# Patient Record
Sex: MALE | Race: WHITE | NOT HISPANIC OR LATINO | Employment: OTHER | URBAN - NONMETROPOLITAN AREA
[De-identification: names, ages, dates, MRNs, and addresses within clinical notes are randomized per-mention and may not be internally consistent; named-entity substitution may affect disease eponyms.]

---

## 2017-08-29 ENCOUNTER — ANESTHESIA (OUTPATIENT)
Dept: GASTROENTEROLOGY | Facility: HOSPITAL | Age: 62
End: 2017-08-29

## 2017-08-29 ENCOUNTER — ANESTHESIA EVENT (OUTPATIENT)
Dept: GASTROENTEROLOGY | Facility: HOSPITAL | Age: 62
End: 2017-08-29

## 2017-08-29 ENCOUNTER — HOSPITAL ENCOUNTER (OUTPATIENT)
Facility: HOSPITAL | Age: 62
Setting detail: HOSPITAL OUTPATIENT SURGERY
Discharge: HOME | End: 2017-08-29
Attending: INTERNAL MEDICINE | Admitting: INTERNAL MEDICINE

## 2017-08-29 VITALS
OXYGEN SATURATION: 93 % | HEART RATE: 88 BPM | TEMPERATURE: 97.1 F | BODY MASS INDEX: 20.51 KG/M2 | HEIGHT: 73 IN | DIASTOLIC BLOOD PRESSURE: 85 MMHG | SYSTOLIC BLOOD PRESSURE: 153 MMHG | RESPIRATION RATE: 20 BRPM | WEIGHT: 154.76 LBS

## 2017-08-29 DIAGNOSIS — K22.2 STRICTURE AND STENOSIS OF ESOPHAGUS: ICD-10-CM

## 2017-08-29 PROCEDURE — 25010000002 PROPOFOL 10 MG/ML EMULSION: Performed by: NURSE ANESTHETIST, CERTIFIED REGISTERED

## 2017-08-29 PROCEDURE — 88305 TISSUE EXAM BY PATHOLOGIST: CPT | Performed by: PATHOLOGY

## 2017-08-29 PROCEDURE — 88305 TISSUE EXAM BY PATHOLOGIST: CPT | Performed by: INTERNAL MEDICINE

## 2017-08-29 RX ORDER — DEXTROSE AND SODIUM CHLORIDE 5; .45 G/100ML; G/100ML
30 INJECTION, SOLUTION INTRAVENOUS CONTINUOUS
Status: DISCONTINUED | OUTPATIENT
Start: 2017-08-29 | End: 2017-08-29 | Stop reason: HOSPADM

## 2017-08-29 RX ORDER — LIDOCAINE HYDROCHLORIDE 10 MG/ML
INJECTION, SOLUTION INFILTRATION; PERINEURAL AS NEEDED
Status: DISCONTINUED | OUTPATIENT
Start: 2017-08-29 | End: 2017-08-29 | Stop reason: SURG

## 2017-08-29 RX ORDER — HYDROXYZINE HYDROCHLORIDE 25 MG/1
25 TABLET, FILM COATED ORAL 3 TIMES DAILY PRN
COMMUNITY
End: 2023-01-16 | Stop reason: ALTCHOICE

## 2017-08-29 RX ORDER — GABAPENTIN 400 MG/1
400 CAPSULE ORAL 3 TIMES DAILY
COMMUNITY
End: 2020-01-06 | Stop reason: HOSPADM

## 2017-08-29 RX ORDER — ALBUTEROL SULFATE 90 UG/1
2 AEROSOL, METERED RESPIRATORY (INHALATION) EVERY 6 HOURS PRN
COMMUNITY

## 2017-08-29 RX ORDER — CLOPIDOGREL BISULFATE 75 MG/1
75 TABLET ORAL DAILY
COMMUNITY
End: 2022-05-20

## 2017-08-29 RX ORDER — CYCLOBENZAPRINE HCL 5 MG
5 TABLET ORAL 3 TIMES DAILY PRN
COMMUNITY

## 2017-08-29 RX ORDER — DOCUSATE SODIUM 100 MG/1
100 CAPSULE, LIQUID FILLED ORAL 2 TIMES DAILY
COMMUNITY
End: 2020-01-06 | Stop reason: HOSPADM

## 2017-08-29 RX ORDER — SERTRALINE HYDROCHLORIDE 100 MG/1
100 TABLET, FILM COATED ORAL DAILY
COMMUNITY
End: 2021-03-16

## 2017-08-29 RX ORDER — ATORVASTATIN CALCIUM 80 MG/1
80 TABLET, FILM COATED ORAL DAILY
Status: ON HOLD | COMMUNITY
End: 2020-01-02

## 2017-08-29 RX ORDER — PROPOFOL 10 MG/ML
VIAL (ML) INTRAVENOUS AS NEEDED
Status: DISCONTINUED | OUTPATIENT
Start: 2017-08-29 | End: 2017-08-29 | Stop reason: SURG

## 2017-08-29 RX ORDER — ALBUTEROL SULFATE 1.25 MG/3ML
1 SOLUTION RESPIRATORY (INHALATION) EVERY 6 HOURS PRN
Status: ON HOLD | COMMUNITY
End: 2022-09-08

## 2017-08-29 RX ORDER — BUDESONIDE AND FORMOTEROL FUMARATE DIHYDRATE 160; 4.5 UG/1; UG/1
2 AEROSOL RESPIRATORY (INHALATION)
COMMUNITY

## 2017-08-29 RX ORDER — ATENOLOL 50 MG/1
50 TABLET ORAL DAILY
COMMUNITY
End: 2020-01-06 | Stop reason: HOSPADM

## 2017-08-29 RX ADMIN — DEXTROSE AND SODIUM CHLORIDE 30 ML/HR: 5; 450 INJECTION, SOLUTION INTRAVENOUS at 09:36

## 2017-08-29 RX ADMIN — PROPOFOL 50 MG: 10 INJECTION, EMULSION INTRAVENOUS at 10:50

## 2017-08-29 RX ADMIN — PROPOFOL 50 MG: 10 INJECTION, EMULSION INTRAVENOUS at 10:52

## 2017-08-29 RX ADMIN — LIDOCAINE HYDROCHLORIDE 80 MG: 10 INJECTION, SOLUTION INFILTRATION; PERINEURAL at 10:48

## 2017-08-29 RX ADMIN — PROPOFOL 150 MG: 10 INJECTION, EMULSION INTRAVENOUS at 10:48

## 2017-08-29 NOTE — ANESTHESIA PREPROCEDURE EVALUATION
Anesthesia Evaluation     NPO Solid Status: > 8 hours  NPO Liquid Status: > 8 hours     Airway   Mallampati: II  TM distance: >3 FB  Neck ROM: full  no difficulty expected  Dental    (+) edentulous    Pulmonary    (+) COPD, asthma, decreased breath sounds,   Cardiovascular - normal exam    (+) hypertension, CAD,       Neuro/Psych  GI/Hepatic/Renal/Endo      Musculoskeletal     (+) chronic pain,   Abdominal    Substance History      OB/GYN          Other                                        Anesthesia Plan    ASA 3     MAC     intravenous induction   Anesthetic plan and risks discussed with patient.

## 2017-08-29 NOTE — ANESTHESIA POSTPROCEDURE EVALUATION
Patient: Aubrey Montilla    Procedure Summary     Date Anesthesia Start Anesthesia Stop Room / Location    08/29/17 1033 1105 [facility] ENDOSCOPY 2 / [facility] ENDOSCOPY       Procedure Diagnosis Surgeon Provider    ESOPHAGOGASTRODUODENOSCOPY (N/A Esophagus) Stricture and stenosis of esophagus  (Stricture and stenosis of esophagus [K22.2]) DO David Fajardo CRNA          Anesthesia Type: MAC  Last vitals  BP        Temp        Pulse       Resp        SpO2          Post Anesthesia Care and Evaluation    Patient location during evaluation: bedside  Patient participation: complete - patient participated  Level of consciousness: awake and alert  Pain score: 0  Pain management: adequate  Airway patency: patent  Anesthetic complications: No anesthetic complications  PONV Status: none  Cardiovascular status: acceptable  Respiratory status: acceptable  Hydration status: acceptable

## 2017-08-31 LAB
LAB AP CASE REPORT: NORMAL
Lab: NORMAL
PATH REPORT.FINAL DX SPEC: NORMAL
PATH REPORT.GROSS SPEC: NORMAL

## 2019-06-11 ENCOUNTER — HOSPITAL ENCOUNTER (OUTPATIENT)
Facility: HOSPITAL | Age: 64
Setting detail: HOSPITAL OUTPATIENT SURGERY
Discharge: HOME | End: 2019-06-11
Attending: INTERNAL MEDICINE | Admitting: INTERNAL MEDICINE

## 2019-06-11 ENCOUNTER — ANESTHESIA (OUTPATIENT)
Dept: GASTROENTEROLOGY | Facility: HOSPITAL | Age: 64
End: 2019-06-11

## 2019-06-11 ENCOUNTER — ANESTHESIA EVENT (OUTPATIENT)
Dept: GASTROENTEROLOGY | Facility: HOSPITAL | Age: 64
End: 2019-06-11

## 2019-06-11 VITALS
HEART RATE: 76 BPM | WEIGHT: 144.84 LBS | OXYGEN SATURATION: 98 % | BODY MASS INDEX: 19.2 KG/M2 | TEMPERATURE: 96.8 F | HEIGHT: 73 IN | RESPIRATION RATE: 18 BRPM | SYSTOLIC BLOOD PRESSURE: 187 MMHG | DIASTOLIC BLOOD PRESSURE: 84 MMHG

## 2019-06-11 DIAGNOSIS — K22.70 BARRETT'S ESOPHAGUS: ICD-10-CM

## 2019-06-11 DIAGNOSIS — K21.9 ESOPHAGEAL REFLUX: ICD-10-CM

## 2019-06-11 PROCEDURE — 88342 IMHCHEM/IMCYTCHM 1ST ANTB: CPT | Performed by: INTERNAL MEDICINE

## 2019-06-11 PROCEDURE — 88305 TISSUE EXAM BY PATHOLOGIST: CPT | Performed by: PATHOLOGY

## 2019-06-11 PROCEDURE — 88342 IMHCHEM/IMCYTCHM 1ST ANTB: CPT | Performed by: PATHOLOGY

## 2019-06-11 PROCEDURE — 25010000002 PROPOFOL 10 MG/ML EMULSION: Performed by: NURSE ANESTHETIST, CERTIFIED REGISTERED

## 2019-06-11 PROCEDURE — 88305 TISSUE EXAM BY PATHOLOGIST: CPT | Performed by: INTERNAL MEDICINE

## 2019-06-11 RX ORDER — LIDOCAINE HYDROCHLORIDE 20 MG/ML
INJECTION, SOLUTION INTRAVENOUS AS NEEDED
Status: DISCONTINUED | OUTPATIENT
Start: 2019-06-11 | End: 2019-06-11 | Stop reason: SURG

## 2019-06-11 RX ORDER — PROPOFOL 10 MG/ML
VIAL (ML) INTRAVENOUS AS NEEDED
Status: DISCONTINUED | OUTPATIENT
Start: 2019-06-11 | End: 2019-06-11 | Stop reason: SURG

## 2019-06-11 RX ORDER — DEXTROSE AND SODIUM CHLORIDE 5; .45 G/100ML; G/100ML
30 INJECTION, SOLUTION INTRAVENOUS CONTINUOUS PRN
Status: DISCONTINUED | OUTPATIENT
Start: 2019-06-11 | End: 2019-06-11 | Stop reason: HOSPADM

## 2019-06-11 RX ADMIN — PROPOFOL 20 MG: 10 INJECTION, EMULSION INTRAVENOUS at 11:17

## 2019-06-11 RX ADMIN — DEXTROSE AND SODIUM CHLORIDE 30 ML/HR: 5; 450 INJECTION, SOLUTION INTRAVENOUS at 10:16

## 2019-06-11 RX ADMIN — LIDOCAINE HYDROCHLORIDE 100 MG: 20 INJECTION, SOLUTION INTRAVENOUS at 11:10

## 2019-06-11 RX ADMIN — PROPOFOL 20 MG: 10 INJECTION, EMULSION INTRAVENOUS at 11:13

## 2019-06-11 RX ADMIN — PROPOFOL 30 MG: 10 INJECTION, EMULSION INTRAVENOUS at 11:15

## 2019-06-11 RX ADMIN — PROPOFOL 100 MG: 10 INJECTION, EMULSION INTRAVENOUS at 11:10

## 2019-06-11 RX ADMIN — PROPOFOL 30 MG: 10 INJECTION, EMULSION INTRAVENOUS at 11:12

## 2019-06-11 NOTE — H&P
Garo Kirkland DO,[facility]  Gastroenterology  Hepatology  Endoscopy  Board Certified in Internal Medicine and gastroenterology  [address], [address]  [place]. [address]  - [phone]   F [phone]     GASTROENTEROLOGY HISTORY AND PHYSICAL  NOTE   GARO KIRKLAND DO.         SUBJECTIVE:   6/11/2019    Name: Aubrey Montilla  DOD: 1955        Chief Complaint:       Subjective : Esophageal stricture with recurrent symptoms of dysphasia    Patient is 63 y.o. male presents with desire for elective EGD and dilation.      ROS/HISTORY/ CURRENT MEDICATIONS/OBJECTIVE/VS/PE:   Review of Systems:  All systems unremarkable unless specified below.  Constitutional   HENT  Eyes   Respiratory    Cardiovascular  Gastrointestinal   Endocrine  Genitourinary    Musculoskeletal   Skin  Allergic/Immunologic    Neurological    Hematological  Psychiatric/Behavioral    History:   History reviewed. No pertinent past medical history.  Past Surgical History:   Procedure Laterality Date   • ENDOSCOPY N/A 8/29/2017    Procedure: ESOPHAGOGASTRODUODENOSCOPY;  Surgeon: Garo Kirkland DO;  Location: [facility] ENDOSCOPY;  Service:      History reviewed. No pertinent family history.  Social History     Tobacco Use   • Smoking status: Not on file   Substance Use Topics   • Alcohol use: Not on file   • Drug use: Not on file     Medications Prior to Admission   Medication Sig Dispense Refill Last Dose   • albuterol (ACCUNEB) 1.25 MG/3ML nebulizer solution Take 1 ampule by nebulization Every 6 (Six) Hours As Needed for Wheezing.   6/10/2019 at Unknown time   • albuterol (PROVENTIL HFA;VENTOLIN HFA) 108 (90 Base) MCG/ACT inhaler Inhale 2 puffs Every 4 (Four) Hours As Needed for Wheezing.   6/10/2019 at Unknown time   • atenolol (TENORMIN) 50 MG tablet Take 50 mg by mouth Daily.   6/10/2019 at Unknown time   • atorvastatin (LIPITOR) 80 MG tablet Take 80 mg by mouth Daily.   6/10/2019 at Unknown time   • budesonide-formoterol  (SYMBICORT) 160-4.5 MCG/ACT inhaler Inhale 2 puffs 2 (Two) Times a Day.   6/10/2019 at Unknown time   • cyclobenzaprine (FLEXERIL) 10 MG tablet Take 10 mg by mouth 3 (Three) Times a Day As Needed for Muscle Spasms.   6/10/2019 at Unknown time   • docusate sodium (COLACE) 100 MG capsule Take 100 mg by mouth 2 (Two) Times a Day.   6/10/2019 at Unknown time   • gabapentin (NEURONTIN) 400 MG capsule Take 400 mg by mouth 3 (Three) Times a Day.   6/10/2019 at Unknown time   • hydrOXYzine (ATARAX) 25 MG tablet Take 25 mg by mouth 3 (Three) Times a Day As Needed for Itching.   6/10/2019 at Unknown time   • sertraline (ZOLOFT) 100 MG tablet Take 100 mg by mouth Daily.   6/10/2019 at Unknown time   • clopidogrel (PLAVIX) 75 MG tablet Take 75 mg by mouth Daily.   6/7/2019     Allergies:  Patient has no known allergies.    I have reviewed the patients medical history, surgical history and family history in the available medical record system.     Current Medications:     No current facility-administered medications for this encounter.        Objective     Physical Exam:   Temp:  [97.6 °F (36.4 °C)] 97.6 °F (36.4 °C)  Heart Rate:  [78] 78  Resp:  [14] 14  BP: (207)/(81) 207/81    Physical Exam:  General Appearance:    Alert, cooperative, in no acute distress   Head:    Normocephalic, without obvious abnormality, atraumatic   Eyes:            Lids and lashes normal, conjunctivae and sclerae normal, no   icterus, no pallor, corneas clear, PERRLA   Ears:    Ears appear intact with no abnormalities noted   Throat:   No oral lesions, no thrush, oral mucosa moist   Neck:   No adenopathy, supple, trachea midline, no thyromegaly, no     carotid bruit, no JVD   Back:     No kyphosis present, no scoliosis present, no skin lesions,       erythema or scars, no tenderness to percussion or                   palpation,   range of motion normal   Lungs:     Clear to auscultation,respirations regular, even and                   unlabored     Heart:    Regular rhythm and normal rate, normal S1 and S2, no            murmur, no gallop, no rub, no click   Breast Exam:    Deferred   Abdomen:     Normal bowel sounds, no masses, no organomegaly, soft        non-tender, non-distended, no guarding, no rebound                 tenderness   Genitalia:    Deferred   Extremities:   Moves all extremities well, no edema, no cyanosis, no              redness   Pulses:   Pulses palpable and equal bilaterally   Skin:   No bleeding, bruising or rash   Lymph nodes:   No palpable adenopathy   Neurologic:   Cranial nerves 2 - 12 grossly intact, sensation intact, DTR        present and equal bilaterally      Results Review:     Lab Results   Component Value Date    WBC 8.6 10/27/2015    HGB 13.9 10/27/2015    HCT 41.5 10/27/2015     10/27/2015             No results found for: LIPASE  Lab Results   Component Value Date    INR 1.0 10/27/2015          Radiology Review:  Imaging Results (last 72 hours)     ** No results found for the last 72 hours. **           I reviewed the patient's new clinical results.  I reviewed the patient's new imaging results and agree with the interpretation.     ASSESSMENT/PLAN:   ASSESSMENT:  1.  Esophageal stricture    PLAN:  1.  Esophagogastroduodenoscopy with dilation      Risk and benefits associated with the procedure are reviewed with the patient.  The patient wished to proceed     Van Khoury DO  06/11/19  10:14 AM

## 2019-06-11 NOTE — ANESTHESIA POSTPROCEDURE EVALUATION
Patient: Aubrey Montilla    Procedure Summary     Date:  06/11/19 Room / Location:  [facility] ENDOSCOPY 2 / [facility] ENDOSCOPY    Anesthesia Start:  1107 Anesthesia Stop:  1119    Procedure:  ESOPHAGOGASTRODUODENOSCOPY (N/A ) Diagnosis:       Villalobos's esophagus      Esophageal reflux      (Villalobos's esophagus [K22.70])      (Esophageal reflux [K21.9])    Surgeon:  Van Khoury DO Provider:  Mary Santoyo CRNA    Anesthesia Type:  MAC ASA Status:  3          Anesthesia Type: MAC  Last vitals  BP   (!) 207/81 (06/11/19 1006)   Temp   97.6 °F (36.4 °C) (06/11/19 1006)   Pulse   78 (06/11/19 1006)   Resp   14 (06/11/19 1006)     SpO2   99 % (06/11/19 1006)     Post Anesthesia Care and Evaluation    Patient location during evaluation: bedside  Patient participation: complete - patient participated  Level of consciousness: sleepy but conscious  Pain management: adequate  Airway patency: patent  Anesthetic complications: No anesthetic complications  PONV Status: none  Cardiovascular status: acceptable  Respiratory status: acceptable  Hydration status: acceptable

## 2019-06-11 NOTE — ANESTHESIA PREPROCEDURE EVALUATION
Anesthesia Evaluation     no history of anesthetic complications:  NPO Solid Status: > 8 hours  NPO Liquid Status: > 2 hours           Airway   Mallampati: II  TM distance: >3 FB  Neck ROM: full  No difficulty expected  Dental - normal exam     Pulmonary - normal exam   (+) COPD, asthma,   Cardiovascular - normal exam    (+) hypertension, past MI , CAD,       Neuro/Psych- negative ROS  GI/Hepatic/Renal/Endo - negative ROS     Musculoskeletal (-) negative ROS    Abdominal    Substance History      OB/GYN          Other                      Anesthesia Plan    ASA 3     MAC     intravenous induction   Anesthetic plan, all risks, benefits, and alternatives have been provided, discussed and informed consent has been obtained with: patient.

## 2019-06-13 LAB
LAB AP CASE REPORT: NORMAL
LAB AP DIAGNOSIS COMMENT: NORMAL
PATH REPORT.FINAL DX SPEC: NORMAL
PATH REPORT.GROSS SPEC: NORMAL

## 2020-01-01 ENCOUNTER — APPOINTMENT (OUTPATIENT)
Dept: GENERAL RADIOLOGY | Facility: HOSPITAL | Age: 65
End: 2020-01-01

## 2020-01-01 ENCOUNTER — HOSPITAL ENCOUNTER (INPATIENT)
Facility: HOSPITAL | Age: 65
LOS: 4 days | Discharge: NURSING, REHAB OR LONG-TERM CARE | End: 2020-01-06
Attending: EMERGENCY MEDICINE | Admitting: INTERNAL MEDICINE

## 2020-01-01 ENCOUNTER — APPOINTMENT (OUTPATIENT)
Dept: CT IMAGING | Facility: HOSPITAL | Age: 65
End: 2020-01-01

## 2020-01-01 ENCOUNTER — APPOINTMENT (OUTPATIENT)
Dept: CARDIOLOGY | Facility: HOSPITAL | Age: 65
End: 2020-01-01

## 2020-01-01 ENCOUNTER — APPOINTMENT (OUTPATIENT)
Dept: MRI IMAGING | Facility: HOSPITAL | Age: 65
End: 2020-01-01

## 2020-01-01 DIAGNOSIS — Z78.9 IMPAIRED MOBILITY AND ADLS: ICD-10-CM

## 2020-01-01 DIAGNOSIS — Z74.09 IMPAIRED MOBILITY AND ADLS: ICD-10-CM

## 2020-01-01 DIAGNOSIS — Z74.09 IMPAIRED FUNCTIONAL MOBILITY, BALANCE, GAIT, AND ENDURANCE: ICD-10-CM

## 2020-01-01 DIAGNOSIS — I63.9 CEREBROVASCULAR ACCIDENT (CVA), UNSPECIFIED MECHANISM (HCC): Primary | ICD-10-CM

## 2020-01-01 DIAGNOSIS — R48.9 SYMBOLIC DYSFUNCTION: ICD-10-CM

## 2020-01-01 LAB
ABO GROUP BLD: NORMAL
ALBUMIN SERPL-MCNC: 4.1 G/DL (ref 3.5–5.2)
ALBUMIN/GLOB SERPL: 0.9 G/DL
ALP SERPL-CCNC: 102 U/L (ref 39–117)
ALT SERPL W P-5'-P-CCNC: 7 U/L (ref 1–41)
ANION GAP SERPL CALCULATED.3IONS-SCNC: 15 MMOL/L (ref 5–15)
AST SERPL-CCNC: 13 U/L (ref 1–40)
BASOPHILS # BLD AUTO: 0.06 10*3/MM3 (ref 0–0.2)
BASOPHILS NFR BLD AUTO: 0.3 % (ref 0–1.5)
BH CV ECHO MEAS - ACS: 2.4 CM
BH CV ECHO MEAS - AI DEC SLOPE: 189 CM/SEC^2
BH CV ECHO MEAS - AI MAX PG: 6.6 MMHG
BH CV ECHO MEAS - AI MAX VEL: 128 CM/SEC
BH CV ECHO MEAS - AI P1/2T: 198.4 MSEC
BH CV ECHO MEAS - AO ISTHMUS: 2.7 CM
BH CV ECHO MEAS - AO MAX PG (FULL): 2.4 MMHG
BH CV ECHO MEAS - AO MAX PG: 5.3 MMHG
BH CV ECHO MEAS - AO MEAN PG (FULL): 1 MMHG
BH CV ECHO MEAS - AO MEAN PG: 3 MMHG
BH CV ECHO MEAS - AO ROOT AREA (BSA CORRECTED): 1.7
BH CV ECHO MEAS - AO ROOT AREA: 7.1 CM^2
BH CV ECHO MEAS - AO ROOT DIAM: 3 CM
BH CV ECHO MEAS - AO V2 MAX: 115 CM/SEC
BH CV ECHO MEAS - AO V2 MEAN: 73.6 CM/SEC
BH CV ECHO MEAS - AO V2 VTI: 18.4 CM
BH CV ECHO MEAS - ASC AORTA: 3.4 CM
BH CV ECHO MEAS - AVA(I,A): 2.8 CM^2
BH CV ECHO MEAS - AVA(I,D): 2.8 CM^2
BH CV ECHO MEAS - AVA(V,A): 2.3 CM^2
BH CV ECHO MEAS - AVA(V,D): 2.3 CM^2
BH CV ECHO MEAS - BSA(HAYCOCK): 1.8 M^2
BH CV ECHO MEAS - BSA: 1.8 M^2
BH CV ECHO MEAS - BZI_BMI: 18.4 KILOGRAMS/M^2
BH CV ECHO MEAS - BZI_METRIC_HEIGHT: 182.9 CM
BH CV ECHO MEAS - BZI_METRIC_WEIGHT: 61.7 KG
BH CV ECHO MEAS - EDV(CUBED): 65.5 ML
BH CV ECHO MEAS - EDV(TEICH): 71.3 ML
BH CV ECHO MEAS - EF(CUBED): 60.4 %
BH CV ECHO MEAS - EF(MOD-BP): 48 %
BH CV ECHO MEAS - EF(TEICH): 52.5 %
BH CV ECHO MEAS - ESV(CUBED): 25.9 ML
BH CV ECHO MEAS - ESV(TEICH): 33.9 ML
BH CV ECHO MEAS - FS: 26.6 %
BH CV ECHO MEAS - IVS/LVPW: 0.85
BH CV ECHO MEAS - IVSD: 0.76 CM
BH CV ECHO MEAS - LA DIMENSION: 3.2 CM
BH CV ECHO MEAS - LA/AO: 1.1
BH CV ECHO MEAS - LV MASS(C)D: 99.9 GRAMS
BH CV ECHO MEAS - LV MASS(C)DI: 55.2 GRAMS/M^2
BH CV ECHO MEAS - LV MAX PG: 2.9 MMHG
BH CV ECHO MEAS - LV MEAN PG: 2 MMHG
BH CV ECHO MEAS - LV V1 MAX: 85.7 CM/SEC
BH CV ECHO MEAS - LV V1 MEAN: 61.4 CM/SEC
BH CV ECHO MEAS - LV V1 VTI: 16.4 CM
BH CV ECHO MEAS - LVIDD: 4 CM
BH CV ECHO MEAS - LVIDS: 3 CM
BH CV ECHO MEAS - LVOT AREA (M): 3.1 CM^2
BH CV ECHO MEAS - LVOT AREA: 3.1 CM^2
BH CV ECHO MEAS - LVOT DIAM: 2 CM
BH CV ECHO MEAS - LVPWD: 0.9 CM
BH CV ECHO MEAS - MV A MAX VEL: 103 CM/SEC
BH CV ECHO MEAS - MV DEC SLOPE: 763 CM/SEC^2
BH CV ECHO MEAS - MV E MAX VEL: 75.7 CM/SEC
BH CV ECHO MEAS - MV E/A: 0.73
BH CV ECHO MEAS - MV MAX PG: 4.1 MMHG
BH CV ECHO MEAS - MV MEAN PG: 2 MMHG
BH CV ECHO MEAS - MV P1/2T MAX VEL: 74.5 CM/SEC
BH CV ECHO MEAS - MV P1/2T: 28.6 MSEC
BH CV ECHO MEAS - MV V2 MAX: 101 CM/SEC
BH CV ECHO MEAS - MV V2 MEAN: 64.2 CM/SEC
BH CV ECHO MEAS - MV V2 VTI: 19.4 CM
BH CV ECHO MEAS - MVA P1/2T LCG: 3 CM^2
BH CV ECHO MEAS - MVA(P1/2T): 7.7 CM^2
BH CV ECHO MEAS - MVA(VTI): 2.7 CM^2
BH CV ECHO MEAS - PA MAX PG: 4.9 MMHG
BH CV ECHO MEAS - PA V2 MAX: 111 CM/SEC
BH CV ECHO MEAS - RVDD: 2.3 CM
BH CV ECHO MEAS - SI(AO): 71.9 ML/M^2
BH CV ECHO MEAS - SI(CUBED): 21.9 ML/M^2
BH CV ECHO MEAS - SI(LVOT): 28.5 ML/M^2
BH CV ECHO MEAS - SI(TEICH): 20.7 ML/M^2
BH CV ECHO MEAS - SV(AO): 130.1 ML
BH CV ECHO MEAS - SV(CUBED): 39.5 ML
BH CV ECHO MEAS - SV(LVOT): 51.5 ML
BH CV ECHO MEAS - SV(TEICH): 37.4 ML
BILIRUB SERPL-MCNC: 0.6 MG/DL (ref 0.2–1.2)
BILIRUB UR QL STRIP: NEGATIVE
BLD GP AB SCN SERPL QL: NEGATIVE
BUN BLD-MCNC: 16 MG/DL (ref 8–23)
BUN/CREAT SERPL: 12 (ref 7–25)
CALCIUM SPEC-SCNC: 9.4 MG/DL (ref 8.6–10.5)
CHLORIDE SERPL-SCNC: 97 MMOL/L (ref 98–107)
CLARITY UR: CLEAR
CO2 SERPL-SCNC: 26 MMOL/L (ref 22–29)
COLOR UR: YELLOW
CREAT BLD-MCNC: 1.33 MG/DL (ref 0.76–1.27)
DEPRECATED RDW RBC AUTO: 42.6 FL (ref 37–54)
EOSINOPHIL # BLD AUTO: 0.04 10*3/MM3 (ref 0–0.4)
EOSINOPHIL NFR BLD AUTO: 0.2 % (ref 0.3–6.2)
ERYTHROCYTE [DISTWIDTH] IN BLOOD BY AUTOMATED COUNT: 12.9 % (ref 12.3–15.4)
GFR SERPL CREATININE-BSD FRML MDRD: 54 ML/MIN/1.73
GLOBULIN UR ELPH-MCNC: 4.6 GM/DL
GLUCOSE BLD-MCNC: 95 MG/DL (ref 65–99)
GLUCOSE BLDC GLUCOMTR-MCNC: 80 MG/DL (ref 70–130)
GLUCOSE BLDC GLUCOMTR-MCNC: 82 MG/DL (ref 70–130)
GLUCOSE BLDC GLUCOMTR-MCNC: 92 MG/DL (ref 70–130)
GLUCOSE UR STRIP-MCNC: NEGATIVE MG/DL
HCT VFR BLD AUTO: 38.9 % (ref 37.5–51)
HGB BLD-MCNC: 12.7 G/DL (ref 13–17.7)
HGB UR QL STRIP.AUTO: NEGATIVE
HOLD SPECIMEN: NORMAL
HOLD SPECIMEN: NORMAL
IMM GRANULOCYTES # BLD AUTO: 0.09 10*3/MM3 (ref 0–0.05)
IMM GRANULOCYTES NFR BLD AUTO: 0.4 % (ref 0–0.5)
INR PPP: 1.19 (ref 0.8–1.2)
KETONES UR QL STRIP: ABNORMAL
LEUKOCYTE ESTERASE UR QL STRIP.AUTO: NEGATIVE
LYMPHOCYTES # BLD AUTO: 1.39 10*3/MM3 (ref 0.7–3.1)
LYMPHOCYTES NFR BLD AUTO: 6.6 % (ref 19.6–45.3)
Lab: NORMAL
MAXIMAL PREDICTED HEART RATE: 156 BPM
MCH RBC QN AUTO: 29.5 PG (ref 26.6–33)
MCHC RBC AUTO-ENTMCNC: 32.6 G/DL (ref 31.5–35.7)
MCV RBC AUTO: 90.5 FL (ref 79–97)
MONOCYTES # BLD AUTO: 1.6 10*3/MM3 (ref 0.1–0.9)
MONOCYTES NFR BLD AUTO: 7.6 % (ref 5–12)
NEUTROPHILS # BLD AUTO: 17.98 10*3/MM3 (ref 1.7–7)
NEUTROPHILS NFR BLD AUTO: 84.9 % (ref 42.7–76)
NITRITE UR QL STRIP: NEGATIVE
NRBC BLD AUTO-RTO: 0 /100 WBC (ref 0–0.2)
PH UR STRIP.AUTO: <=5 [PH] (ref 5–9)
PLATELET # BLD AUTO: 366 10*3/MM3 (ref 140–450)
PMV BLD AUTO: 10.4 FL (ref 6–12)
POTASSIUM BLD-SCNC: 4 MMOL/L (ref 3.5–5.2)
PROCALCITONIN SERPL-MCNC: 0.09 NG/ML (ref 0.1–0.25)
PROT SERPL-MCNC: 8.7 G/DL (ref 6–8.5)
PROT UR QL STRIP: NEGATIVE
PROTHROMBIN TIME: 14.9 SECONDS (ref 11.1–15.3)
RBC # BLD AUTO: 4.3 10*6/MM3 (ref 4.14–5.8)
RH BLD: POSITIVE
SODIUM BLD-SCNC: 138 MMOL/L (ref 136–145)
SP GR UR STRIP: 1.09 (ref 1–1.03)
STRESS TARGET HR: 133 BPM
T&S EXPIRATION DATE: NORMAL
TROPONIN T SERPL-MCNC: <0.01 NG/ML (ref 0–0.03)
UROBILINOGEN UR QL STRIP: ABNORMAL
WBC NRBC COR # BLD: 21.16 10*3/MM3 (ref 3.4–10.8)
WHOLE BLOOD HOLD SPECIMEN: NORMAL
WHOLE BLOOD HOLD SPECIMEN: NORMAL

## 2020-01-01 PROCEDURE — 93005 ELECTROCARDIOGRAM TRACING: CPT | Performed by: EMERGENCY MEDICINE

## 2020-01-01 PROCEDURE — 85610 PROTHROMBIN TIME: CPT | Performed by: EMERGENCY MEDICINE

## 2020-01-01 PROCEDURE — 87040 BLOOD CULTURE FOR BACTERIA: CPT | Performed by: EMERGENCY MEDICINE

## 2020-01-01 PROCEDURE — 70450 CT HEAD/BRAIN W/O DYE: CPT

## 2020-01-01 PROCEDURE — 86900 BLOOD TYPING SEROLOGIC ABO: CPT | Performed by: EMERGENCY MEDICINE

## 2020-01-01 PROCEDURE — 86850 RBC ANTIBODY SCREEN: CPT | Performed by: EMERGENCY MEDICINE

## 2020-01-01 PROCEDURE — 86901 BLOOD TYPING SEROLOGIC RH(D): CPT | Performed by: EMERGENCY MEDICINE

## 2020-01-01 PROCEDURE — 93306 TTE W/DOPPLER COMPLETE: CPT | Performed by: INTERNAL MEDICINE

## 2020-01-01 PROCEDURE — 36415 COLL VENOUS BLD VENIPUNCTURE: CPT | Performed by: EMERGENCY MEDICINE

## 2020-01-01 PROCEDURE — 94760 N-INVAS EAR/PLS OXIMETRY 1: CPT

## 2020-01-01 PROCEDURE — G0378 HOSPITAL OBSERVATION PER HR: HCPCS

## 2020-01-01 PROCEDURE — 93010 ELECTROCARDIOGRAM REPORT: CPT | Performed by: INTERNAL MEDICINE

## 2020-01-01 PROCEDURE — 85025 COMPLETE CBC W/AUTO DIFF WBC: CPT | Performed by: EMERGENCY MEDICINE

## 2020-01-01 PROCEDURE — 93306 TTE W/DOPPLER COMPLETE: CPT

## 2020-01-01 PROCEDURE — 70496 CT ANGIOGRAPHY HEAD: CPT

## 2020-01-01 PROCEDURE — 82962 GLUCOSE BLOOD TEST: CPT

## 2020-01-01 PROCEDURE — 94799 UNLISTED PULMONARY SVC/PX: CPT

## 2020-01-01 PROCEDURE — 71045 X-RAY EXAM CHEST 1 VIEW: CPT

## 2020-01-01 PROCEDURE — 70551 MRI BRAIN STEM W/O DYE: CPT

## 2020-01-01 PROCEDURE — 84145 PROCALCITONIN (PCT): CPT | Performed by: EMERGENCY MEDICINE

## 2020-01-01 PROCEDURE — 76376 3D RENDER W/INTRP POSTPROCES: CPT | Performed by: INTERNAL MEDICINE

## 2020-01-01 PROCEDURE — 84484 ASSAY OF TROPONIN QUANT: CPT | Performed by: EMERGENCY MEDICINE

## 2020-01-01 PROCEDURE — 99204 OFFICE O/P NEW MOD 45 MIN: CPT | Performed by: PSYCHIATRY & NEUROLOGY

## 2020-01-01 PROCEDURE — 81003 URINALYSIS AUTO W/O SCOPE: CPT | Performed by: EMERGENCY MEDICINE

## 2020-01-01 PROCEDURE — 0 IOPAMIDOL PER 1 ML: Performed by: EMERGENCY MEDICINE

## 2020-01-01 PROCEDURE — 99284 EMERGENCY DEPT VISIT MOD MDM: CPT

## 2020-01-01 PROCEDURE — 70498 CT ANGIOGRAPHY NECK: CPT

## 2020-01-01 PROCEDURE — 80053 COMPREHEN METABOLIC PANEL: CPT | Performed by: EMERGENCY MEDICINE

## 2020-01-01 RX ORDER — ATORVASTATIN CALCIUM 40 MG/1
80 TABLET, FILM COATED ORAL NIGHTLY
Status: DISCONTINUED | OUTPATIENT
Start: 2020-01-01 | End: 2020-01-02

## 2020-01-01 RX ORDER — LORAZEPAM 0.5 MG/1
0.5 TABLET ORAL ONCE
Status: COMPLETED | OUTPATIENT
Start: 2020-01-01 | End: 2020-01-01

## 2020-01-01 RX ORDER — ASPIRIN 81 MG/1
81 TABLET, CHEWABLE ORAL DAILY
Status: DISCONTINUED | OUTPATIENT
Start: 2020-01-02 | End: 2020-01-01 | Stop reason: SDUPTHER

## 2020-01-01 RX ORDER — ATORVASTATIN CALCIUM 40 MG/1
40 TABLET, FILM COATED ORAL NIGHTLY
Status: DISCONTINUED | OUTPATIENT
Start: 2020-01-01 | End: 2020-01-01 | Stop reason: DRUGHIGH

## 2020-01-01 RX ORDER — ASPIRIN 300 MG/1
300 SUPPOSITORY RECTAL DAILY
Status: DISCONTINUED | OUTPATIENT
Start: 2020-01-02 | End: 2020-01-06 | Stop reason: HOSPADM

## 2020-01-01 RX ORDER — CLOPIDOGREL BISULFATE 75 MG/1
75 TABLET ORAL DAILY
Status: DISCONTINUED | OUTPATIENT
Start: 2020-01-01 | End: 2020-01-01

## 2020-01-01 RX ORDER — SODIUM CHLORIDE 9 MG/ML
125 INJECTION, SOLUTION INTRAVENOUS CONTINUOUS
Status: DISCONTINUED | OUTPATIENT
Start: 2020-01-01 | End: 2020-01-01

## 2020-01-01 RX ORDER — CLOPIDOGREL BISULFATE 75 MG/1
75 TABLET ORAL DAILY
Status: DISCONTINUED | OUTPATIENT
Start: 2020-01-01 | End: 2020-01-06 | Stop reason: HOSPADM

## 2020-01-01 RX ORDER — ASPIRIN 325 MG
325 TABLET ORAL ONCE
Status: COMPLETED | OUTPATIENT
Start: 2020-01-01 | End: 2020-01-01

## 2020-01-01 RX ORDER — SODIUM CHLORIDE 0.9 % (FLUSH) 0.9 %
10 SYRINGE (ML) INJECTION AS NEEDED
Status: DISCONTINUED | OUTPATIENT
Start: 2020-01-01 | End: 2020-01-06 | Stop reason: HOSPADM

## 2020-01-01 RX ORDER — SODIUM CHLORIDE 0.9 % (FLUSH) 0.9 %
10 SYRINGE (ML) INJECTION EVERY 12 HOURS SCHEDULED
Status: DISCONTINUED | OUTPATIENT
Start: 2020-01-01 | End: 2020-01-06 | Stop reason: HOSPADM

## 2020-01-01 RX ORDER — SODIUM CHLORIDE 9 MG/ML
75 INJECTION, SOLUTION INTRAVENOUS CONTINUOUS
Status: DISCONTINUED | OUTPATIENT
Start: 2020-01-01 | End: 2020-01-03

## 2020-01-01 RX ORDER — ASPIRIN 81 MG/1
81 TABLET, CHEWABLE ORAL DAILY
Status: DISCONTINUED | OUTPATIENT
Start: 2020-01-02 | End: 2020-01-06 | Stop reason: HOSPADM

## 2020-01-01 RX ORDER — SODIUM CHLORIDE 0.9 % (FLUSH) 0.9 %
10 SYRINGE (ML) INJECTION AS NEEDED
Status: DISCONTINUED | OUTPATIENT
Start: 2020-01-01 | End: 2020-01-01

## 2020-01-01 RX ORDER — ASPIRIN 81 MG/1
81 TABLET, CHEWABLE ORAL DAILY
COMMUNITY
End: 2022-05-20

## 2020-01-01 RX ADMIN — IOPAMIDOL 93 ML: 755 INJECTION, SOLUTION INTRAVENOUS at 10:54

## 2020-01-01 RX ADMIN — DOXYCYCLINE 100 MG: 100 INJECTION, POWDER, LYOPHILIZED, FOR SOLUTION INTRAVENOUS at 11:25

## 2020-01-01 RX ADMIN — ATORVASTATIN CALCIUM 80 MG: 40 TABLET, FILM COATED ORAL at 20:10

## 2020-01-01 RX ADMIN — SODIUM CHLORIDE 125 ML/HR: 900 INJECTION, SOLUTION INTRAVENOUS at 10:37

## 2020-01-01 RX ADMIN — SODIUM CHLORIDE, PRESERVATIVE FREE 10 ML: 5 INJECTION INTRAVENOUS at 20:10

## 2020-01-01 RX ADMIN — SODIUM CHLORIDE 75 ML/HR: 9 INJECTION, SOLUTION INTRAVENOUS at 14:29

## 2020-01-01 RX ADMIN — LORAZEPAM 0.5 MG: 0.5 TABLET ORAL at 13:23

## 2020-01-01 RX ADMIN — ASPIRIN 325 MG: 325 TABLET, COATED ORAL at 09:59

## 2020-01-01 NOTE — ED PROVIDER NOTES
Subjective   64 years old male with history of COPD, hypertension, hyperlipidemia, stroke with some residual right-sided weakness presented in the ER with chief complaint of sudden onset headache yesterday followed by tingling and weakness of whole left side with getting off balance and was close to fall.  This happened around 10:30 AM yesterday.  After few minutes is weakness started to improve but still have numbness left upper extremity/whole face more on the right side.  Patient denied any difficulty speech or visual symptoms.  Denies any chest pain palpitations or shortness of breath.  He has been taking Plavix from his previous stroke.      History provided by:  Patient  Stroke   Presenting symptoms: headaches, incoordination, loss of balance, sensory loss and weakness    Onset quality:  Sudden  Last known well:  1030, 12/31/2019  Timing:  Constant  Progression:  Partially resolved  Associated symptoms: paresthesias    Associated symptoms: no chest pain, no facial pain, no fever, no hearing loss, no bladder incontinence, no nausea, no neck pain, no vertigo and no vomiting        Review of Systems   Constitutional: Negative for fever.   HENT: Negative for congestion, hearing loss, sinus pressure, sneezing and sore throat.    Respiratory: Positive for cough. Negative for chest tightness and shortness of breath.    Cardiovascular: Negative for chest pain.   Gastrointestinal: Negative for abdominal pain, nausea and vomiting.   Genitourinary: Negative for bladder incontinence and frequency.   Musculoskeletal: Negative for neck pain.   Skin: Negative for color change.   Neurological: Positive for weakness, numbness, headaches, disturbances in coordination, paresthesias and loss of balance. Negative for vertigo.   Psychiatric/Behavioral: Negative for agitation.       History reviewed. No pertinent past medical history.    No Known Allergies    Past Surgical History:   Procedure Laterality Date   • ENDOSCOPY N/A  8/29/2017    Procedure: ESOPHAGOGASTRODUODENOSCOPY;  Surgeon: Van Khoury DO;  Location: [facility] ENDOSCOPY;  Service:    • ENDOSCOPY N/A 6/11/2019    Procedure: ESOPHAGOGASTRODUODENOSCOPY;  Surgeon: Van Khoury DO;  Location: [facility] ENDOSCOPY;  Service: Gastroenterology       History reviewed. No pertinent family history.    Social History     Socioeconomic History   • Marital status:      Spouse name: Not on file   • Number of children: Not on file   • Years of education: Not on file   • Highest education level: Not on file           Objective   Physical Exam   Constitutional: He is oriented to person, place, and time. He appears well-developed and well-nourished.   HENT:   Head: Normocephalic and atraumatic.   Right Ear: External ear normal.   Left Ear: External ear normal.   Nose: Nose normal.   Mouth/Throat: Oropharynx is clear and moist.   Eyes: Pupils are equal, round, and reactive to light. Conjunctivae and EOM are normal.   Neck: Normal range of motion. Neck supple.   Cardiovascular: Normal rate, regular rhythm and normal heart sounds.   Pulmonary/Chest: Effort normal. No stridor. No respiratory distress. He has wheezes.   Abdominal: Soft. Bowel sounds are normal. He exhibits no distension. There is no tenderness.   Musculoskeletal: Normal range of motion. He exhibits no tenderness.   Neurological: He is alert and oriented to person, place, and time. He displays abnormal reflex. No cranial nerve deficit or sensory deficit. He exhibits normal muscle tone. Coordination normal. GCS eye subscore is 4. GCS verbal subscore is 5. GCS motor subscore is 6. He displays no Babinski's sign on the right side. He displays no Babinski's sign on the left side.   Reflex Scores:       Bicep reflexes are 2+ on the right side and 2+ on the left side.       Patellar reflexes are 2+ on the right side and 2+ on the left side.  Nursing note and vitals reviewed.      ECG 12 Lead    Date/Time: 1/1/2020 9:58  AM  Performed by: Lior England MD  Authorized by: Lior England MD   Interpreted by physician  Rhythm: sinus rhythm  Rate: normal  QRS axis: normal  ST Segments: ST segments normal  T Waves: T waves normal  Clinical impression: normal ECG                 ED Course  ED Course as of Jan 01 1530 Wed Jan 01, 2020   0951 Patient does have acute right frontal infarct, discussed with Dr. Ward at [facility] neurology, recommended CTA head and neck, aspirin, n.p.o. and admission to hospitalist service and patient would be evaluated soon.  Discussed with  and patient is accepted for admission.    [AN]   1013 Discussed with [facility] where patient wanted to go but got declined because of lack of services.    [AN]      ED Course User Index  [AN] Lior England MD                                       Labs Reviewed   COMPREHENSIVE METABOLIC PANEL - Abnormal; Notable for the following components:       Result Value    Creatinine 1.33 (*)     Chloride 97 (*)     Total Protein 8.7 (*)     eGFR Non  Amer 54 (*)     All other components within normal limits    Narrative:     GFR Normal >60  Chronic Kidney Disease <60  Kidney Failure <15     CBC WITH AUTO DIFFERENTIAL - Abnormal; Notable for the following components:    WBC 21.16 (*)     Hemoglobin 12.7 (*)     Neutrophil % 84.9 (*)     Lymphocyte % 6.6 (*)     Eosinophil % 0.2 (*)     Neutrophils, Absolute 17.98 (*)     Monocytes, Absolute 1.60 (*)     Immature Grans, Absolute 0.09 (*)     All other components within normal limits   PROTIME-INR - Normal    Narrative:     Therapeutic range for most indications is 2.0-3.0 INR,  or 2.5-3.5 for mechanical heart valves.   POCT GLUCOSE FINGERSTICK - Normal   RAINBOW DRAW    Narrative:     The following orders were created for panel order [place] Draw.  Procedure                               Abnormality         Status                     ---------                               -----------         ------                      Light Blue Top[[id number]]                                   In process                 Green Top (Gel)[[id number]]                                  In process                 Lavender Top[[id number]]                                     In process                 Gold Top - SST[[id number]]                                   In process                   Please view results for these tests on the individual orders.   TROPONIN (IN-HOUSE)   POCT GLUCOSE FINGERSTICK   TYPE AND SCREEN   PREVIOUS HISTORY   CBC AND DIFFERENTIAL    Narrative:     The following orders were created for panel order CBC & Differential.  Procedure                               Abnormality         Status                     ---------                               -----------         ------                     CBC Auto Differential[[id number]]        Abnormal            Final result                 Please view results for these tests on the individual orders.   LIGHT BLUE TOP   GREEN TOP   LAVENDER TOP   GOLD TOP - SST       Xr Chest 1 View    Result Date: 1/1/2020  Narrative: PROCEDURE: Single chest view AP REASON FOR EXAM:Stroke Protocol (Onset > 12 hrs) FINDINGS: Comparison exam dated October 27, 2015. Cardiac and pulmonary vasculature are normal. Lungs are clear. Pleural spaces are normal. No acute osseous abnormality.     Impression: No acute cardiopulmonary abnormality. Electronically signed by:  Quinn Triplett MD  1/1/2020 9:44 AM CST Workstation: [id number]    Ct Head Without Contrast Stroke Protocol    Result Date: 1/1/2020  Narrative: PROCEDURE: CT head without contrast REASON FOR EXAM: Stroke. Left-sided weakness This exam was performed according to our departmental dose-optimization program, which includes automated exposure control, adjustment of the mA and/or kV according to patient size and/or use of iterative reconstruction technique. FINDINGS: Comparison study dated October 27, 2015. Axial computer tomography sequential imaging  of the head was performed from the vertex to the base of the skull. .Sagittal and coronal reformation was performed . The skull vault is intact. Paranasal sinuses and bilateral mastoid air cells are well aerated. New small right frontal lobe gray-white matter focus of hypodensity suspicious for acute infarct. Bilateral frontal lobe periventricular deep white matter small foci of hypodensity. Bilateral frontal lobe deep white matter small circular foci of CSF attenuation with the largest involving the left which measures 4.8 mm in greatest diameter. Bilateral thalami small oval CSF attenuating lesions which are sub-5 mm in size. Cerebral and cerebellar parenchymal are otherwise unremarkable. Ventricular system and subarachnoid spaces are normal.     Impression: 1.  New small right frontal lobe gray-white matter focus of hypodensity suspicious for acute infarct. 2.  Bilateral frontal lobe periventricular deep white matter foci of hypodensities most likely representing chronic ischemic gliosis secondary to microvascular disease. 3.  Bilateral frontal lobe deep white matter old lacunar infarcts. 4.  Bilateral thalami old lacunar infarct. 5.  ER clinician notified of findings by phone at 9:30 AM on 1/1/2020. Electronically signed by:  Quinn Triplett MD  1/1/2020 9:39 AM [facility] Workstation: [id number]              [facility]    Final diagnoses:   Cerebrovascular accident (CVA), unspecified mechanism (CMS/HCC)            Lior England MD  01/01/20 [id number]

## 2020-01-01 NOTE — H&P
[facility]    Patient Identification:  Name:  Aubrey Montilla  Age:  64 y.o.  Sex:  male  :  1955  MRN:  [id number]   Visit Number:  [id number]  Primary Care Physician:  Lorenzo Mondragon MD     Chief complaint: Right facial numbness left sided weakness.    History of presenting illness:  64 y.o. male with known history of hypertension, coronary artery disease previous CVA who presents today emergency room complaining of right-sided facial numbness and weakness of the left upper and lower extremities all of 1 day duration.  Patient said that his legs were so weak that he fell down.  On presentation to the emergency room patient had a CT of the brain which showed he has an acute infarct.  Telemetry neurology was consulted and recommended CTA which did not show any significant occlusion.  Of note patient has been on aspirin and Plavix at home.  Patient also has been coughing.  His cough is productive of greenish phlegm but he denies any chest pain or shortness of breath.  He has not had any fever and does not have any urinary symptoms.  Patient had leukocytosis on evaluation in the emergency room.  ---------------------------------------------------------------------------------------------------------------------   Review of Systems   HENT: Negative.    Eyes: Negative.    Respiratory: Positive for cough.    Cardiovascular: Negative.    Gastrointestinal: Negative.    Endocrine: Negative.    Genitourinary: Negative.    Musculoskeletal: Negative.    Skin: Negative.    Neurological: Positive for weakness and numbness.   Psychiatric/Behavioral: Negative.       ---------------------------------------------------------------------------------------------------------------------   History reviewed. No pertinent past medical history.  Past Surgical History:   Procedure Laterality Date   • ENDOSCOPY N/A 2017    Procedure: ESOPHAGOGASTRODUODENOSCOPY;   Surgeon: Van Khoury DO;  Location: [facility] ENDOSCOPY;  Service:    • ENDOSCOPY N/A 6/11/2019    Procedure: ESOPHAGOGASTRODUODENOSCOPY;  Surgeon: Van Khoury DO;  Location: [facility] ENDOSCOPY;  Service: Gastroenterology     History reviewed. No pertinent family history.  Social History     Socioeconomic History   • Marital status:      Spouse name: Not on file   • Number of children: Not on file   • Years of education: Not on file   • Highest education level: Not on file     ---------------------------------------------------------------------------------------------------------------------   Allergies:  Patient has no known allergies.  ---------------------------------------------------------------------------------------------------------------------   Prior to Admission Medications     Prescriptions Last Dose Informant Patient Reported? Taking?    albuterol (ACCUNEB) 1.25 MG/3ML nebulizer solution   Yes No    Take 1 ampule by nebulization Every 6 (Six) Hours As Needed for Wheezing.    albuterol (PROVENTIL HFA;VENTOLIN HFA) 108 (90 Base) MCG/ACT inhaler   Yes No    Inhale 2 puffs Every 4 (Four) Hours As Needed for Wheezing.    atenolol (TENORMIN) 50 MG tablet   Yes No    Take 50 mg by mouth Daily.    atorvastatin (LIPITOR) 80 MG tablet   Yes No    Take 80 mg by mouth Daily.    budesonide-formoterol (SYMBICORT) 160-4.5 MCG/ACT inhaler   Yes No    Inhale 2 puffs 2 (Two) Times a Day.    clopidogrel (PLAVIX) 75 MG tablet   Yes No    Take 75 mg by mouth Daily.    cyclobenzaprine (FLEXERIL) 10 MG tablet   Yes No    Take 10 mg by mouth 3 (Three) Times a Day As Needed for Muscle Spasms.    docusate sodium (COLACE) 100 MG capsule   Yes No    Take 100 mg by mouth 2 (Two) Times a Day.    gabapentin (NEURONTIN) 400 MG capsule   Yes No    Take 400 mg by mouth 3 (Three) Times a Day.    hydrOXYzine (ATARAX) 25 MG tablet   Yes No    Take 25 mg by mouth 3 (Three) Times a Day As Needed for Itching.    sertraline  (ZOLOFT) 100 MG tablet   Yes No    Take 100 mg by mouth Daily.        ---------------------------------------------------------------------------------------------------------------------   Vital Signs:  Temp:  [97.5 °F (36.4 °C)] 97.5 °F (36.4 °C)  Heart Rate:  [97] 97  Resp:  [20] 20  BP: (146)/(63) 146/63  There were no vitals filed for this visit.  There is no height or weight on file to calculate BMI.  ---------------------------------------------------------------------------------------------------------------------   Physical Exam:  Constitutional: Middle aged man.  No respiratory distress.      HENT:  Head: Normocephalic and atraumatic.  Mouth:  Moist mucous membranes.    Eyes:  Conjunctivae and EOM are normal.  Pupils are equal, round, and reactive to light.  No scleral icterus.  Neck:  Neck supple.  No JVD present.    Cardiovascular:  Normal rate, regular rhythm and normal heart sounds with no murmur.  Pulmonary/Chest:  No respiratory distress, no wheezes, no crackles, with normal breath sounds and good air movement.  Abdominal:  Soft.  Bowel sounds are normal.  No distension and no tenderness.   Musculoskeletal:  No edema, no tenderness, and no deformity.  No red or swollen joints anywhere.  Muscle wasting especially in the small fingers and thenar eminence  Neurological:  Alert and oriented to person, place, and time.  No cranial nerve deficit.  No tongue deviation.  No facial droop.  No slurred speech.  Mild weakness of the left .  Skin:  Skin is warm and dry.  No rash noted.  No pallor.   Psychiatric:  Normal mood and affect.  Behavior is normal.  Judgment and thought content normal.   Peripheral vascular:  No edema and strong pulses on all 4 extremities.  Genitourinary: Deferred  ---------------------------------------------------------------------------------------------------------------------  EKG:       ---------------------------------------------------------------------------------------------------------------------   Results from last 7 days   Lab Units 01/01/20  0859   WBC 10*3/mm3 21.16*   HEMOGLOBIN g/dL 12.7*   HEMATOCRIT % 38.9   MCV fL 90.5   MCHC g/dL 32.6   PLATELETS 10*3/mm3 366   INR  1.19         Results from last 7 days   Lab Units 01/01/20  0859   SODIUM mmol/L 138   POTASSIUM mmol/L 4.0   CHLORIDE mmol/L 97*   CO2 mmol/L 26.0   BUN mg/dL 16   CREATININE mg/dL 1.33*   EGFR IF NONAFRICN AM mL/min/1.73 54*   CALCIUM mg/dL 9.4   GLUCOSE mg/dL 95   ALBUMIN g/dL 4.10   BILIRUBIN mg/dL 0.6   ALK PHOS U/L 102   AST (SGOT) U/L 13   ALT (SGPT) U/L 7   CrCl cannot be calculated (Unknown ideal weight.).  No results found for: AMMONIA  Results from last 7 days   Lab Units 01/01/20  0859   TROPONIN T ng/mL <0.010         No results found for: HGBA1C  No results found for: TSH, FREET4  No results found for: PREGTESTUR, PREGSERUM, HCG, HCGQUANT  Pain Management Panel     There is no flowsheet data to display.        No results found for: BLOODCX  No results found for: URINECX  No results found for: WOUNDCX  No results found for: STOOLCX        I have personally looked at the labs and they are stated above.  ---------------------------------------------------------------------------------------------------------------------  Imaging Results (Last 7 Days)     Procedure Component Value Units Date/Time    CT Angiogram Neck [[id number]] Resulted:  01/01/20 1037     Updated:  01/01/20 1037    CT Angiogram Head [[id number]] Resulted:  01/01/20 1037     Updated:  01/01/20 1037    XR Chest 1 View [[id number]] Collected:  01/01/20 0928     Updated:  01/01/20 0945    Narrative:         PROCEDURE: Single chest view AP    REASON FOR EXAM:Stroke Protocol (Onset > 12 hrs)    FINDINGS: Comparison exam dated October 27, 2015. Cardiac and  pulmonary vasculature are normal. Lungs are clear. Pleural spaces  are normal. No acute  osseous abnormality.      Impression:       No acute cardiopulmonary abnormality.    Electronically signed by:  Quinn Triplett MD  1/1/2020 9:44 AM CST  Workstation: [id number]    CT Head Without Contrast Stroke Protocol [[id number]] Collected:  01/01/20 0908     Updated:  01/01/20 0940    Narrative:         PROCEDURE: CT head without contrast    REASON FOR EXAM: Stroke. Left-sided weakness    This exam was performed according to our departmental  dose-optimization program, which includes automated exposure  control, adjustment of the mA and/or kV according to patient size  and/or use of iterative reconstruction technique.    FINDINGS: Comparison study dated October 27, 2015. Axial computer  tomography sequential imaging of the head was performed from the  vertex to the base of the skull. .Sagittal and coronal  reformation was performed .    The skull vault is intact. Paranasal sinuses and bilateral  mastoid air cells are well aerated. New small right frontal lobe  gray-white matter focus of hypodensity suspicious for acute  infarct. Bilateral frontal lobe periventricular deep white matter  small foci of hypodensity. Bilateral frontal lobe deep white  matter small circular foci of CSF attenuation with the largest  involving the left which measures 4.8 mm in greatest diameter.  Bilateral thalami small oval CSF attenuating lesions which are  sub-5 mm in size. Cerebral and cerebellar parenchymal are  otherwise unremarkable. Ventricular system and subarachnoid  spaces are normal.      Impression:       1.  New small right frontal lobe gray-white matter focus of  hypodensity suspicious for acute infarct.   2.  Bilateral frontal lobe periventricular deep white matter foci  of hypodensities most likely representing chronic ischemic  gliosis secondary to microvascular disease.  3.  Bilateral frontal lobe deep white matter old lacunar  infarcts.  4.  Bilateral thalami old lacunar infarct.  5.  ER clinician notified of findings by  phone at 9:30 AM on  1/1/2020.    Electronically signed by:  Quinn Triplett MD  1/1/2020 9:39 AM [facility]  Workstation: [id number]          I have personally reviewed the radiology images and read the final radiology report.  ---------------------------------------------------------------------------------------------------------------------  Assessment:  Acute frontal lobe infarct  Leukocytosis probably due to bronchitis  Coronary artery disease  Peripheral vascular disease  Hypertension    Plan:   Place patient in observation status.  Diet: Heart healthy diet.  Patient passed swallow evaluation.  Gentle rehydration with IV normal saline 75 cc per h  Check procalcitonin level.  Start patient on IV doxycycline.  Obtain blood cultures and urinalysis.  At home patient is already on aspirin and Plavix.  Will continue same  We will continue patient on Lipitor.  Follow-up with MRI result  Follow-up with 2D echo results with bubble studies.  PT/OT evaluation and management.  Patient blood pressure is adequately controlled we will continue with current home medications.  Appreciate telemetry neurology recommendation.      Prophylaxis  DVT.  -SCDs    Position:  Observation status.  Patient is full code  Patient spouse at the medical power of .  Anticipate discharge home probably with home health in the next 24 to 48 hours.  Plan of care was discussed with the patient.    Pawel Pratt MD  01/01/20  10:48 AM     Dragon disclaimer:  Much of this encounter note is an electronic transcription/translation of spoken language to printed text. The electronic translation of spoken language may permit erroneous, or at times, nonsensical words or phrases to be inadvertently transcribed; Although I have reviewed the note for such errors, some may still exist.

## 2020-01-01 NOTE — CONSULTS
Stroke Consult Note    Patient Name: Aubrey Montilla   MRN: [id number]  Age: 64 y.o.  Sex: male  : 1955    Primary Care Physician: Lorenzo Mondragon MD  Referring Physician:  Lior England MD    TIME STROKE TEAM CALLED: 1041 EST     TIME PATIENT SEEN: 1141 EST    Handedness: Left  Race: White     Chief Complaint/Reason for Consultation: L sided weakness    HPI: 66 yo LHWM with h/o HTN, CAD s/p 2 stents, PAD s/p RLE stent, stroke with no significant residual, on aspirin and plavix- who had sudden onset of dizziness and L sided weakness yest at around 1100 CST- and symptoms has improved in last 24 hrs. Pt didn't come yest, as he had to take care of his disabled wife, and today his step-daughter brought him to hospital. He still has some L hand weakness, but has improved significantly. Pt was having bruising on his body, for which his PCP asked pt to start taking ASA and plavix every other day- which he is taking since last 1 mth.        Last Known Normal Date/Time: 2019, 1100 CST     Review of Systems   Constitutional: Negative.         Has been taking ASA and plavix every other day for last month   HENT: Negative.    Eyes: Negative.    Respiratory: Positive for cough.    Cardiovascular: Negative.    Gastrointestinal: Negative.    Endocrine: Negative.    Genitourinary: Negative.    Musculoskeletal: Negative.    Allergic/Immunologic: Negative.    Hematological: Negative.    Psychiatric/Behavioral: Negative.         Temp:  [97.5 °F (36.4 °C)] 97.5 °F (36.4 °C)  Heart Rate:  [94-97] 94  Resp:  [20] 20  BP: (107-146)/(59-63) 107/59    Neurological Exam  Mental Status  Awake, alert and oriented to person, place and time. Speech is normal. Language is fluent with no aphasia.    Cranial Nerves  CN II: Visual fields full to confrontation.  CN III, IV, VI: Extraocular movements intact bilaterally.  CN V: Facial sensation is normal.  CN VII: Full and symmetric facial movement.  CN VIII: Hearing is normal.  CN  IX, X: Palate elevates symmetrically  CN XI: Shoulder shrug strength is normal.  CN XII: Tongue midline without atrophy or fasciculations.    Motor  Normal muscle bulk throughout. No fasciculations present.  L pronator drift, axising around L arm, L hand weak. LLE seems strong.   RUE/RLE normal.    Sensory  Light touch is normal in upper and lower extremities.     Reflexes  Not assessed.    Coordination  Finger-to-nose, rapid alternating movements and heel-to-shin normal bilaterally without dysmetria.    Gait  Not assessed.      Physical Exam   Constitutional:   Malnourished. BMI-19.   Neck: Normal range of motion.   Cardiovascular: Normal rate.   Pulmonary/Chest: Effort normal.   Neurological: Coordination normal.   Psychiatric: His speech is normal.       Acute Stroke Data    Alteplase (tPA) Inclusion / Exclusion Criteria    Time: 11:20 AM  Person Administering Scale: Bernardino Ward MD    Inclusion Criteria  [x]   18 years of age or greater   []   Onset of symptoms < 4.5 hours before beginning treatment (stroke onset = time patient was last seen well or without symptoms).   [x]   Diagnosis of acute ischemic stroke causing measurable disabling deficit (Complete Hemianopia, Any Aphasia, Visual or Sensory Extinction, Any weakness limiting sustained effort against gravity)   []   Any remaining deficit considered potentially disabling in view of patient and practitioner   Exclusion criteria (Do not proceed with Alteplase if any are checked under exclusion criteria)  [x]   Onset unknown or GREATER than 4.5 hours   []   ICH on CT/MRI   [x]   CT demonstrates hypodensity representing acute or subacute infarct   []   Significant head trauma or prior stroke in the previous 3 months   []   Symptoms suggestive of subarachnoid hemorrhage   []   History of un-ruptured intracranial aneurysm GREATER than 10 mm   []   Recent intracranial or intraspinal surgery within the last 3 months   []   Arterial puncture at a  non-compressible site in the previous 7 days   []   Active internal bleeding   []   Acute bleeding tendency   []   Platelet count LESS than 100,000 for known hematological diseases such as leukemia, thrombocytopenia or chronic cirrhosis   []   Current use of anticoagulant with INR GREATER than 1.7 or PT GREATER than 15 seconds, aPTT GREATER than 40 seconds   []   Heparin received within 48 hours, resulting in abnormally elevated aPTT GREATER than upper limit of normal   []   Current use of direct thrombin inhibitors or direct factor Xa inhibitors in the past 48 hours   []   Elevated blood pressure refractory to treatment (systolic GREATER than 185 mm/Hg or diastolic  GREATER than 110 mm/Hg   []   Suspected infective endocarditis and aortic arch dissection   []   Current use of therapeutic treatment dose of low-molecular-weight heparin (LMWH) within the previous 24 hours   []   Structural GI malignancy or bleed   Relative exclusion for all patients  []   Only minor non-disabling symptoms   []   Pregnancy   []   Seizure at onset with postictal residual neurological impairments   []   Major surgery or previous trauma within past 14 days   []   History of previous spontaneous ICH, intracranial neoplasm, or AV malformation   []   Postpartum (within previous 14 days)   []   Recent GI or urinary tract hemorrhage (within previous 21 days)   []   Recent acute MI (within previous 3 months)   []   History of un-ruptured intracranial aneurysm LESS than 10 mm   []   History of ruptured intracranial aneurysm   []   Blood glucose LESS than 50 mg/dL (2.7 mmol/L)   []   Dural puncture within the last 7 days   []   Known GREATER than 10 cerebral microbleeds   Additional exclusions for patients with symptoms onset between 3 and 4.5 hours.  []   Age > 80.   []   On any anticoagulants regardless of INR  >>> Warfarin (Coumadin), Heparin, Enoxaparin (Lovenox), fondaparinux (Arixtra), bivalirudin (Angiomax), Argatroban, dabigatran  (Pradaxa), rivaroxaban (Xarelto), or apixaban (Eliquis)   []   Severe stroke (NIHSS > 25).   []   History of BOTH diabetes and previous ischemic stroke.   []   The risks and benefits have been discussed with the patient or family related to the administration of IV Alteplase for stroke symptoms.   []   I have discussed and reviewed the patient's case and imaging with the attending prior to IV Alteplase.    Time Alteplase administered       History reviewed. No pertinent past medical history.  Past Surgical History:   Procedure Laterality Date   • ENDOSCOPY N/A 8/29/2017    Procedure: ESOPHAGOGASTRODUODENOSCOPY;  Surgeon: Van Khoury DO;  Location: [facility] ENDOSCOPY;  Service:    • ENDOSCOPY N/A 6/11/2019    Procedure: ESOPHAGOGASTRODUODENOSCOPY;  Surgeon: Van Khoury DO;  Location: [facility] ENDOSCOPY;  Service: Gastroenterology     History reviewed. No pertinent family history.  Social History     Socioeconomic History   • Marital status:      Spouse name: Not on file   • Number of children: Not on file   • Years of education: Not on file   • Highest education level: Not on file     No Known Allergies  Prior to Admission medications    Medication Sig Start Date End Date Taking? Authorizing Provider   albuterol (ACCUNEB) 1.25 MG/3ML nebulizer solution Take 1 ampule by nebulization Every 6 (Six) Hours As Needed for Wheezing.    Star Raman MD   albuterol (PROVENTIL HFA;VENTOLIN HFA) 108 (90 Base) MCG/ACT inhaler Inhale 2 puffs Every 4 (Four) Hours As Needed for Wheezing.    Star Raman MD   atenolol (TENORMIN) 50 MG tablet Take 50 mg by mouth Daily.    Star Raman MD   atorvastatin (LIPITOR) 80 MG tablet Take 80 mg by mouth Daily.    Star Raman MD   budesonide-formoterol (SYMBICORT) 160-4.5 MCG/ACT inhaler Inhale 2 puffs 2 (Two) Times a Day.    Star Raman MD   clopidogrel (PLAVIX) 75 MG tablet Take 75 mg by mouth Daily.    Star Raman MD    cyclobenzaprine (FLEXERIL) 10 MG tablet Take 10 mg by mouth 3 (Three) Times a Day As Needed for Muscle Spasms.    Star Raman MD   docusate sodium (COLACE) 100 MG capsule Take 100 mg by mouth 2 (Two) Times a Day.    Star Raman MD   gabapentin (NEURONTIN) 400 MG capsule Take 400 mg by mouth 3 (Three) Times a Day.    Star Raman MD   hydrOXYzine (ATARAX) 25 MG tablet Take 25 mg by mouth 3 (Three) Times a Day As Needed for Itching.    Star Raman MD   sertraline (ZOLOFT) 100 MG tablet Take 100 mg by mouth Daily.    Star Raman MD       Hospital Meds:  Scheduled-   doxycycline 100 mg Intravenous Once     Infusions-   sodium chloride 125 mL/hr Last Rate: 125 mL/hr (20 1037)      PRNs- sodium chloride    Functional Status Prior to Current Stroke/Kumar Score: 0    NIH Stroke Scale  Time: 11:20 AM  Person Administering Scale: Bernardino Ward MD    1a  Level of consciousness: 0=alert; keenly responsive   1b. LOC questions:  0=Performs both tasks correctly   1c. LOC commands: 0=Performs both tasks correctly   2.  Best Gaze: 0=normal   3.  Visual: 0=No visual loss   4. Facial Palsy: 0=Normal symmetric movement   5a.  Motor left arm: 1=Drift, limb holds 90 (or 45) degrees but drifts down before full 10 seconds: does not hit bed   5b.  Motor right arm: 0=No drift, limb holds 90 (or 45) degrees for full 10 seconds   6a. motor left le=No drift, limb holds 90 (or 45) degrees for full 10 seconds   6b  Motor right le=No drift, limb holds 90 (or 45) degrees for full 10 seconds   7. Limb Ataxia: 0=Absent   8.  Sensory: 0=Normal; no sensory loss   9. Best Language:  0=No aphasia, normal   10. Dysarthria: 0=Normal   11. Extinction and Inattention: 0=No abnormality    Total:   1       Results Reviewed:  I have personally reviewed current lab, radiology, and data and agree with results.  Lab Results (last 24 hours)     Procedure Component Value Units Date/Time     Procalcitonin [[id number]] Collected:  01/01/20 1101    Specimen:  Blood Updated:  01/01/20 1101    Blood Culture - Blood, Arm, Left [[id number]] Collected:  01/01/20 1101    Specimen:  Blood from Arm, Left Updated:  01/01/20 1101    Troponin [[id number]]  (Normal) Collected:  01/01/20 0859    Specimen:  Blood Updated:  01/01/20 1007     Troponin T <0.010 ng/mL     Narrative:       Troponin T Reference Range:  <= 0.03 ng/mL-   Negative for AMI  >0.03 ng/mL-     Abnormal for myocardial necrosis.  Clinicians would have to utilize clinical acumen, EKG, Troponin and serial changes to determine if it is an Acute Myocardial Infarction or myocardial injury due to an underlying chronic condition.     [place] Draw [[id number]] Collected:  01/01/20 0859    Specimen:  Blood Updated:  01/01/20 1000    Narrative:       The following orders were created for panel order [place] Draw.  Procedure                               Abnormality         Status                     ---------                               -----------         ------                     Light Blue Top[[id number]]                                   Final result               Green Top (Gel)[[id number]]                                  Final result               Lavender Top[[id number]]                                     Final result               Gold Top - SST[[id number]]                                   Final result                 Please view results for these tests on the individual orders.    Gold Top - SST [[id number]] Collected:  01/01/20 0859    Specimen:  Blood Updated:  01/01/20 1000     Extra Tube Hold for add-ons.     Comment: Auto resulted.       Light Blue Top [[id number]] Collected:  01/01/20 0859    Specimen:  Blood Updated:  01/01/20 1000     Extra Tube hold for add-on     Comment: Auto resulted       Green Top (Gel) [[id number]] Collected:  01/01/20 0859    Specimen:  Blood Updated:  01/01/20 1000     Extra Tube Hold for add-ons.     Comment: Auto  resulted.       Lucia Top [[id number]] Collected:  01/01/20 0859    Specimen:  Blood Updated:  01/01/20 1000     Extra Tube hold for add-on     Comment: Auto resulted       Comprehensive Metabolic Panel [[id number]]  (Abnormal) Collected:  01/01/20 0859    Specimen:  Blood Updated:  01/01/20 0930     Glucose 95 mg/dL      BUN 16 mg/dL      Creatinine 1.33 mg/dL      Sodium 138 mmol/L      Potassium 4.0 mmol/L      Chloride 97 mmol/L      CO2 26.0 mmol/L      Calcium 9.4 mg/dL      Total Protein 8.7 g/dL      Albumin 4.10 g/dL      ALT (SGPT) 7 U/L      AST (SGOT) 13 U/L      Alkaline Phosphatase 102 U/L      Total Bilirubin 0.6 mg/dL      eGFR Non African Amer 54 mL/min/1.73      Globulin 4.6 gm/dL      A/G Ratio 0.9 g/dL      BUN/Creatinine Ratio 12.0     Anion Gap 15.0 mmol/L     Narrative:       GFR Normal >60  Chronic Kidney Disease <60  Kidney Failure <15      Protime-INR [[id number]]  (Normal) Collected:  01/01/20 0859    Specimen:  Blood Updated:  01/01/20 0919     Protime 14.9 Seconds      INR 1.19    Narrative:       Therapeutic range for most indications is 2.0-3.0 INR,  or 2.5-3.5 for mechanical heart valves.    CBC & Differential [[id number]] Collected:  01/01/20 0859    Specimen:  Blood Updated:  01/01/20 0908    Narrative:       The following orders were created for panel order CBC & Differential.  Procedure                               Abnormality         Status                     ---------                               -----------         ------                     CBC Auto Differential[[id number]]        Abnormal            Final result                 Please view results for these tests on the individual orders.    CBC Auto Differential [[id number]]  (Abnormal) Collected:  01/01/20 0859    Specimen:  Blood Updated:  01/01/20 0908     WBC 21.16 10*3/mm3      RBC 4.30 10*6/mm3      Hemoglobin 12.7 g/dL      Hematocrit 38.9 %      MCV 90.5 fL      MCH 29.5 pg      MCHC 32.6 g/dL      RDW 12.9 %       RDW-SD 42.6 fl      MPV 10.4 fL      Platelets 366 10*3/mm3      Neutrophil % 84.9 %      Lymphocyte % 6.6 %      Monocyte % 7.6 %      Eosinophil % 0.2 %      Basophil % 0.3 %      Immature Grans % 0.4 %      Neutrophils, Absolute 17.98 10*3/mm3      Lymphocytes, Absolute 1.39 10*3/mm3      Monocytes, Absolute 1.60 10*3/mm3      Eosinophils, Absolute 0.04 10*3/mm3      Basophils, Absolute 0.06 10*3/mm3      Immature Grans, Absolute 0.09 10*3/mm3      nRBC 0.0 /100 WBC     POC Glucose Once [[id number]]  (Normal) Collected:  01/01/20 0851    Specimen:  Blood Updated:  01/01/20 0908     Glucose 92 mg/dL      Comment: : [id number] CYNDEE OSORIOMeter ID: [id number]           Imaging Results (Last 24 Hours)     Procedure Component Value Units Date/Time    CT Angiogram Neck [[id number]] Resulted:  01/01/20 1037     Updated:  01/01/20 1052    CT Angiogram Head [[id number]] Resulted:  01/01/20 1037     Updated:  01/01/20 1052    XR Chest 1 View [[id number]] Collected:  01/01/20 0928     Updated:  01/01/20 0945    Narrative:         PROCEDURE: Single chest view AP    REASON FOR EXAM:Stroke Protocol (Onset > 12 hrs)    FINDINGS: Comparison exam dated October 27, 2015. Cardiac and  pulmonary vasculature are normal. Lungs are clear. Pleural spaces  are normal. No acute osseous abnormality.      Impression:       No acute cardiopulmonary abnormality.    Electronically signed by:  Quinn Triplett MD  1/1/2020 9:44 AM CST  Workstation: [id number]    CT Head Without Contrast Stroke Protocol [[id number]] Collected:  01/01/20 0908     Updated:  01/01/20 0940    Narrative:         PROCEDURE: CT head without contrast    REASON FOR EXAM: Stroke. Left-sided weakness    This exam was performed according to our departmental  dose-optimization program, which includes automated exposure  control, adjustment of the mA and/or kV according to patient size  and/or use of iterative reconstruction technique.    FINDINGS: Comparison study dated  October 27, 2015. Axial computer  tomography sequential imaging of the head was performed from the  vertex to the base of the skull. .Sagittal and coronal  reformation was performed .    The skull vault is intact. Paranasal sinuses and bilateral  mastoid air cells are well aerated. New small right frontal lobe  gray-white matter focus of hypodensity suspicious for acute  infarct. Bilateral frontal lobe periventricular deep white matter  small foci of hypodensity. Bilateral frontal lobe deep white  matter small circular foci of CSF attenuation with the largest  involving the left which measures 4.8 mm in greatest diameter.  Bilateral thalami small oval CSF attenuating lesions which are  sub-5 mm in size. Cerebral and cerebellar parenchymal are  otherwise unremarkable. Ventricular system and subarachnoid  spaces are normal.      Impression:       1.  New small right frontal lobe gray-white matter focus of  hypodensity suspicious for acute infarct.   2.  Bilateral frontal lobe periventricular deep white matter foci  of hypodensities most likely representing chronic ischemic  gliosis secondary to microvascular disease.  3.  Bilateral frontal lobe deep white matter old lacunar  infarcts.  4.  Bilateral thalami old lacunar infarct.  5.  ER clinician notified of findings by phone at 9:30 AM on  1/1/2020.    Electronically signed by:  Quinn Triplett MD  1/1/2020 9:39 AM CST  Workstation: [id number]             Assessment/Plan:  64 yo LHWM with h/o HTN, CAD, PAD, here with L sided weakness, which has improved significantly. Pt has been taking his ASA/Plavix every other day since last month. CTH- shows acute-subacute RMCA stroke, chronic bilateral lacunar strokes.       1. RMCA stroke - CTH already shows RMCA stroke, along with some subacute to chronic changes as well. CTA head/neck shows no significant stenosis or occlusion. Likely embolic source. Will get MRI brain stroke protocol, 2D v STACY based on MRI results. Will start ASA  81mg and plavix 75mg daily. Lipitor 40mg daily. Okay for normal BP goals, as pt showed no symptoms worsening with his BP in 100s.   2. HTN- Normal BP goals. Will not start any BP meds for now  3. CAD/PAD- Will start aspirin and plavix daily. Along with statins.   4. Leukocytosis- with cough- possible URTI. CXR looked okay. Further treatment per hospitalist.   5. Activity- Bedrest for today. PT/OT tomorrow  6. Diet- Bedside swallw eval. Okay to start diet if clears swallow eval.     Case was d/w pt and nursing. Will update hospitalist as well. Thank you for the consult.           Bernardino Ward MD  January 1, 2020  11:20 AM    Verbal consent taken.  Patient agreeable to be seen via telemedicine.    This was an audio and video enabled telemedicine encounter.

## 2020-01-01 NOTE — ED NOTES
RONALD Farooq returned call. [facility]  states that Dr. Araiza refuses pt transfer. ER MD notified at this time.      Mariah Herr RN  01/01/20 1015

## 2020-01-01 NOTE — NURSING NOTE
Patient states he is unable to go to MRI d/t anxiety. Dr. Pratt notified of pts anxiety and discussed not wanting the patient sedated d/t stroke assessment. Orders received for PO Ativan. MRI notified of delay.

## 2020-01-02 ENCOUNTER — APPOINTMENT (OUTPATIENT)
Dept: CARDIOLOGY | Facility: HOSPITAL | Age: 65
End: 2020-01-02

## 2020-01-02 LAB
BASOPHILS # BLD AUTO: 0.04 10*3/MM3 (ref 0–0.2)
BASOPHILS NFR BLD AUTO: 0.3 % (ref 0–1.5)
CHOLEST SERPL-MCNC: 119 MG/DL (ref 0–200)
DEPRECATED RDW RBC AUTO: 41.4 FL (ref 37–54)
EOSINOPHIL # BLD AUTO: 0.23 10*3/MM3 (ref 0–0.4)
EOSINOPHIL NFR BLD AUTO: 1.8 % (ref 0.3–6.2)
ERYTHROCYTE [DISTWIDTH] IN BLOOD BY AUTOMATED COUNT: 12.8 % (ref 12.3–15.4)
GLUCOSE BLDC GLUCOMTR-MCNC: 85 MG/DL (ref 70–130)
HBA1C MFR BLD: 5 % (ref 4.8–5.6)
HCT VFR BLD AUTO: 32.9 % (ref 37.5–51)
HDLC SERPL-MCNC: 41 MG/DL (ref 40–60)
HGB BLD-MCNC: 10.8 G/DL (ref 13–17.7)
IMM GRANULOCYTES # BLD AUTO: 0.06 10*3/MM3 (ref 0–0.05)
IMM GRANULOCYTES NFR BLD AUTO: 0.5 % (ref 0–0.5)
LDLC SERPL CALC-MCNC: 66 MG/DL (ref 0–100)
LDLC/HDLC SERPL: 1.6 {RATIO}
LYMPHOCYTES # BLD AUTO: 1.22 10*3/MM3 (ref 0.7–3.1)
LYMPHOCYTES NFR BLD AUTO: 9.5 % (ref 19.6–45.3)
MCH RBC QN AUTO: 29 PG (ref 26.6–33)
MCHC RBC AUTO-ENTMCNC: 32.8 G/DL (ref 31.5–35.7)
MCV RBC AUTO: 88.4 FL (ref 79–97)
MONOCYTES # BLD AUTO: 1 10*3/MM3 (ref 0.1–0.9)
MONOCYTES NFR BLD AUTO: 7.8 % (ref 5–12)
NEUTROPHILS # BLD AUTO: 10.25 10*3/MM3 (ref 1.7–7)
NEUTROPHILS NFR BLD AUTO: 80.1 % (ref 42.7–76)
NRBC BLD AUTO-RTO: 0 /100 WBC (ref 0–0.2)
PLATELET # BLD AUTO: 278 10*3/MM3 (ref 140–450)
PMV BLD AUTO: 10.8 FL (ref 6–12)
RBC # BLD AUTO: 3.72 10*6/MM3 (ref 4.14–5.8)
TRIGL SERPL-MCNC: 61 MG/DL (ref 0–150)
VLDLC SERPL-MCNC: 12.2 MG/DL
WBC NRBC COR # BLD: 12.8 10*3/MM3 (ref 3.4–10.8)

## 2020-01-02 PROCEDURE — 99221 1ST HOSP IP/OBS SF/LOW 40: CPT | Performed by: NURSE PRACTITIONER

## 2020-01-02 PROCEDURE — 85025 COMPLETE CBC W/AUTO DIFF WBC: CPT | Performed by: INTERNAL MEDICINE

## 2020-01-02 PROCEDURE — 93312 ECHO TRANSESOPHAGEAL: CPT | Performed by: INTERNAL MEDICINE

## 2020-01-02 PROCEDURE — 80061 LIPID PANEL: CPT | Performed by: INTERNAL MEDICINE

## 2020-01-02 PROCEDURE — 93321 DOPPLER ECHO F-UP/LMTD STD: CPT | Performed by: INTERNAL MEDICINE

## 2020-01-02 PROCEDURE — G0407 INPT/TELE FOLLOW UP 25: HCPCS | Performed by: PSYCHIATRY & NEUROLOGY

## 2020-01-02 PROCEDURE — 83036 HEMOGLOBIN GLYCOSYLATED A1C: CPT | Performed by: INTERNAL MEDICINE

## 2020-01-02 PROCEDURE — 25010000002 MIDAZOLAM PER 1 MG: Performed by: INTERNAL MEDICINE

## 2020-01-02 PROCEDURE — 82962 GLUCOSE BLOOD TEST: CPT

## 2020-01-02 PROCEDURE — 97162 PT EVAL MOD COMPLEX 30 MIN: CPT

## 2020-01-02 PROCEDURE — 93312 ECHO TRANSESOPHAGEAL: CPT

## 2020-01-02 PROCEDURE — B24BZZ4 ULTRASONOGRAPHY OF HEART WITH AORTA, TRANSESOPHAGEAL: ICD-10-PCS | Performed by: INTERNAL MEDICINE

## 2020-01-02 PROCEDURE — 93325 DOPPLER ECHO COLOR FLOW MAPG: CPT

## 2020-01-02 PROCEDURE — 92523 SPEECH SOUND LANG COMPREHEN: CPT | Performed by: SPEECH-LANGUAGE PATHOLOGIST

## 2020-01-02 PROCEDURE — 93325 DOPPLER ECHO COLOR FLOW MAPG: CPT | Performed by: INTERNAL MEDICINE

## 2020-01-02 PROCEDURE — 97166 OT EVAL MOD COMPLEX 45 MIN: CPT

## 2020-01-02 PROCEDURE — 99152 MOD SED SAME PHYS/QHP 5/>YRS: CPT

## 2020-01-02 PROCEDURE — 93321 DOPPLER ECHO F-UP/LMTD STD: CPT

## 2020-01-02 PROCEDURE — 25010000002 FENTANYL CITRATE (PF) 100 MCG/2ML SOLUTION: Performed by: INTERNAL MEDICINE

## 2020-01-02 RX ORDER — ATENOLOL 50 MG/1
50 TABLET ORAL DAILY
Status: DISCONTINUED | OUTPATIENT
Start: 2020-01-02 | End: 2020-01-03

## 2020-01-02 RX ORDER — ATORVASTATIN CALCIUM 40 MG/1
40 TABLET, FILM COATED ORAL NIGHTLY
Status: DISCONTINUED | OUTPATIENT
Start: 2020-01-02 | End: 2020-01-06 | Stop reason: HOSPADM

## 2020-01-02 RX ORDER — GABAPENTIN 100 MG/1
200 CAPSULE ORAL 3 TIMES DAILY
Status: DISCONTINUED | OUTPATIENT
Start: 2020-01-02 | End: 2020-01-06 | Stop reason: HOSPADM

## 2020-01-02 RX ORDER — DOCUSATE SODIUM 100 MG/1
100 CAPSULE, LIQUID FILLED ORAL DAILY
Status: DISCONTINUED | OUTPATIENT
Start: 2020-01-02 | End: 2020-01-06 | Stop reason: HOSPADM

## 2020-01-02 RX ORDER — FENTANYL CITRATE 50 UG/ML
INJECTION, SOLUTION INTRAMUSCULAR; INTRAVENOUS
Status: COMPLETED | OUTPATIENT
Start: 2020-01-02 | End: 2020-01-02

## 2020-01-02 RX ORDER — SODIUM CHLORIDE 9 MG/ML
INJECTION, SOLUTION INTRAVENOUS
Status: DISPENSED
Start: 2020-01-02 | End: 2020-01-03

## 2020-01-02 RX ORDER — BUDESONIDE AND FORMOTEROL FUMARATE DIHYDRATE 160; 4.5 UG/1; UG/1
2 AEROSOL RESPIRATORY (INHALATION)
Status: DISCONTINUED | OUTPATIENT
Start: 2020-01-02 | End: 2020-01-06 | Stop reason: HOSPADM

## 2020-01-02 RX ORDER — MIDAZOLAM HYDROCHLORIDE 1 MG/ML
INJECTION INTRAMUSCULAR; INTRAVENOUS
Status: COMPLETED | OUTPATIENT
Start: 2020-01-02 | End: 2020-01-02

## 2020-01-02 RX ADMIN — FENTANYL CITRATE 25 MCG: 50 INJECTION, SOLUTION INTRAMUSCULAR; INTRAVENOUS at 09:51

## 2020-01-02 RX ADMIN — ATORVASTATIN CALCIUM 40 MG: 40 TABLET, FILM COATED ORAL at 20:33

## 2020-01-02 RX ADMIN — SODIUM CHLORIDE, PRESERVATIVE FREE 10 ML: 5 INJECTION INTRAVENOUS at 20:36

## 2020-01-02 RX ADMIN — CLOPIDOGREL BISULFATE 75 MG: 75 TABLET ORAL at 08:02

## 2020-01-02 RX ADMIN — GABAPENTIN 200 MG: 100 CAPSULE ORAL at 20:33

## 2020-01-02 RX ADMIN — SERTRALINE HYDROCHLORIDE 100 MG: 50 TABLET ORAL at 15:01

## 2020-01-02 RX ADMIN — SODIUM CHLORIDE 75 ML/HR: 9 INJECTION, SOLUTION INTRAVENOUS at 04:57

## 2020-01-02 RX ADMIN — ATENOLOL 50 MG: 50 TABLET ORAL at 15:01

## 2020-01-02 RX ADMIN — SODIUM CHLORIDE 75 ML/HR: 9 INJECTION, SOLUTION INTRAVENOUS at 20:35

## 2020-01-02 RX ADMIN — SODIUM CHLORIDE, PRESERVATIVE FREE 10 ML: 5 INJECTION INTRAVENOUS at 08:02

## 2020-01-02 RX ADMIN — MIDAZOLAM HYDROCHLORIDE 1 MG: 2 INJECTION, SOLUTION INTRAMUSCULAR; INTRAVENOUS at 09:50

## 2020-01-02 RX ADMIN — DOCUSATE SODIUM 100 MG: 100 CAPSULE, LIQUID FILLED ORAL at 15:01

## 2020-01-02 RX ADMIN — GABAPENTIN 200 MG: 100 CAPSULE ORAL at 15:04

## 2020-01-02 RX ADMIN — GUAIFENESIN 200 MG: 200 SOLUTION ORAL at 22:10

## 2020-01-02 RX ADMIN — ASPIRIN 81 MG 81 MG: 81 TABLET ORAL at 08:02

## 2020-01-02 RX ADMIN — MIDAZOLAM HYDROCHLORIDE 1 MG: 2 INJECTION, SOLUTION INTRAMUSCULAR; INTRAVENOUS at 09:47

## 2020-01-02 RX ADMIN — FENTANYL CITRATE 25 MCG: 50 INJECTION, SOLUTION INTRAMUSCULAR; INTRAVENOUS at 09:47

## 2020-01-02 NOTE — PAYOR COMM NOTE
"Aubrey Wilcox (64 y.o. Male)     Date of Birth Social Security Number Address Home Phone MRN    1955  [address] [facility] [address] [phone] [id number]    Buddhist Marital Status          Syn[employer]        Admission Date Admission Type Admitting Provider Attending Provider Department, Room/Bed    20 Emergency Pawel Pratt MD [place], Emmanuel [place], MD [facility] STEPDOWN UNIT, /    Discharge Date Discharge Disposition Discharge Destination                       Attending Provider:  Pawel Pratt MD    Allergies:  Contrast Dye    Isolation:  None   Infection:  None   Code Status:  CPR    Ht:  185.4 cm (73\")   Wt:  61.5 kg (135 lb 8 oz)    Admission Cmt:  None   Principal Problem:  None                Active Insurance as of 2020     Primary Coverage     Payor Plan Insurance Group Employer/Plan Group    [employer] [facility] DEPT [id number]     Payor Plan Address Payor Plan Phone Number Payor Plan Fax Number Effective Dates    [employer]ICE 04 [phone]  2018 - None Entered    [address] [facility] [id number]       Subscriber Name Subscriber Birth Date Member ID       AUBREY WILCOX 1955 [id number]                 Emergency Contacts      (Rel.) Home Phone Work Phone Mobile Phone    Billie Wilcox (Spouse) [phone] -- [phone]               History & Physical      Pawel Pratt MD at 20 [address] [place] [address] HOSPITALIST HISTORY AND PHYSICAL    Patient Identification:  Name:  Aubrey Wilcox  Age:  64 y.o.  Sex:  male  :  1955  MRN:  [id number]   Visit Number:  [id number]  Primary Care Physician:  Lorenzo Mondragon MD     Chief complaint: Right facial numbness left sided weakness.    History of presenting illness:  64 y.o. male with known history of hypertension, coronary artery disease previous CVA " who presents today emergency room complaining of right-sided facial numbness and weakness of the left upper and lower extremities all of 1 day duration.  Patient said that his legs were so weak that he fell down.  On presentation to the emergency room patient had a CT of the brain which showed he has an acute infarct.  Telemetry neurology was consulted and recommended CTA which did not show any significant occlusion.  Of note patient has been on aspirin and Plavix at home.  Patient also has been coughing.  His cough is productive of greenish phlegm but he denies any chest pain or shortness of breath.  He has not had any fever and does not have any urinary symptoms.  Patient had leukocytosis on evaluation in the emergency room.  ---------------------------------------------------------------------------------------------------------------------   Review of Systems   HENT: Negative.    Eyes: Negative.    Respiratory: Positive for cough.    Cardiovascular: Negative.    Gastrointestinal: Negative.    Endocrine: Negative.    Genitourinary: Negative.    Musculoskeletal: Negative.    Skin: Negative.    Neurological: Positive for weakness and numbness.   Psychiatric/Behavioral: Negative.       ---------------------------------------------------------------------------------------------------------------------   History reviewed. No pertinent past medical history.  Past Surgical History:   Procedure Laterality Date   • ENDOSCOPY N/A 8/29/2017    Procedure: ESOPHAGOGASTRODUODENOSCOPY;  Surgeon: Van Khoury DO;  Location: [facility] ENDOSCOPY;  Service:    • ENDOSCOPY N/A 6/11/2019    Procedure: ESOPHAGOGASTRODUODENOSCOPY;  Surgeon: Van hKoury DO;  Location: [facility] ENDOSCOPY;  Service: Gastroenterology     History reviewed. No pertinent family history.  Social History     Socioeconomic History   • Marital status:      Spouse name: Not on file   • Number of children: Not on file   • Years of education: Not on  file   • Highest education level: Not on file     ---------------------------------------------------------------------------------------------------------------------   Allergies:  Patient has no known allergies.  ---------------------------------------------------------------------------------------------------------------------   Prior to Admission Medications     Prescriptions Last Dose Informant Patient Reported? Taking?    albuterol (ACCUNEB) 1.25 MG/3ML nebulizer solution   Yes No    Take 1 ampule by nebulization Every 6 (Six) Hours As Needed for Wheezing.    albuterol (PROVENTIL HFA;VENTOLIN HFA) 108 (90 Base) MCG/ACT inhaler   Yes No    Inhale 2 puffs Every 4 (Four) Hours As Needed for Wheezing.    atenolol (TENORMIN) 50 MG tablet   Yes No    Take 50 mg by mouth Daily.    atorvastatin (LIPITOR) 80 MG tablet   Yes No    Take 80 mg by mouth Daily.    budesonide-formoterol (SYMBICORT) 160-4.5 MCG/ACT inhaler   Yes No    Inhale 2 puffs 2 (Two) Times a Day.    clopidogrel (PLAVIX) 75 MG tablet   Yes No    Take 75 mg by mouth Daily.    cyclobenzaprine (FLEXERIL) 10 MG tablet   Yes No    Take 10 mg by mouth 3 (Three) Times a Day As Needed for Muscle Spasms.    docusate sodium (COLACE) 100 MG capsule   Yes No    Take 100 mg by mouth 2 (Two) Times a Day.    gabapentin (NEURONTIN) 400 MG capsule   Yes No    Take 400 mg by mouth 3 (Three) Times a Day.    hydrOXYzine (ATARAX) 25 MG tablet   Yes No    Take 25 mg by mouth 3 (Three) Times a Day As Needed for Itching.    sertraline (ZOLOFT) 100 MG tablet   Yes No    Take 100 mg by mouth Daily.        ---------------------------------------------------------------------------------------------------------------------   Vital Signs:  Temp:  [97.5 °F (36.4 °C)] 97.5 °F (36.4 °C)  Heart Rate:  [97] 97  Resp:  [20] 20  BP: (146)/(63) 146/63  There were no vitals filed for this visit.  There is no height or weight on file to calculate  BMI.  ---------------------------------------------------------------------------------------------------------------------   Physical Exam:  Constitutional: Middle aged man.  No respiratory distress.      HENT:  Head: Normocephalic and atraumatic.  Mouth:  Moist mucous membranes.    Eyes:  Conjunctivae and EOM are normal.  Pupils are equal, round, and reactive to light.  No scleral icterus.  Neck:  Neck supple.  No JVD present.    Cardiovascular:  Normal rate, regular rhythm and normal heart sounds with no murmur.  Pulmonary/Chest:  No respiratory distress, no wheezes, no crackles, with normal breath sounds and good air movement.  Abdominal:  Soft.  Bowel sounds are normal.  No distension and no tenderness.   Musculoskeletal:  No edema, no tenderness, and no deformity.  No red or swollen joints anywhere.  Muscle wasting especially in the small fingers and thenar eminence  Neurological:  Alert and oriented to person, place, and time.  No cranial nerve deficit.  No tongue deviation.  No facial droop.  No slurred speech.  Mild weakness of the left .  Skin:  Skin is warm and dry.  No rash noted.  No pallor.   Psychiatric:  Normal mood and affect.  Behavior is normal.  Judgment and thought content normal.   Peripheral vascular:  No edema and strong pulses on all 4 extremities.  Genitourinary: Deferred  ---------------------------------------------------------------------------------------------------------------------  EKG:      ---------------------------------------------------------------------------------------------------------------------   Results from last 7 days   Lab Units 01/01/20  0859   WBC 10*3/mm3 21.16*   HEMOGLOBIN g/dL 12.7*   HEMATOCRIT % 38.9   MCV fL 90.5   MCHC g/dL 32.6   PLATELETS 10*3/mm3 366   INR  1.19         Results from last 7 days   Lab Units 01/01/20  0859   SODIUM mmol/L 138   POTASSIUM mmol/L 4.0   CHLORIDE mmol/L 97*   CO2 mmol/L 26.0   BUN mg/dL 16   CREATININE mg/dL 1.33*   EGFR  IF NONAFRICN AM mL/min/1.73 54*   CALCIUM mg/dL 9.4   GLUCOSE mg/dL 95   ALBUMIN g/dL 4.10   BILIRUBIN mg/dL 0.6   ALK PHOS U/L 102   AST (SGOT) U/L 13   ALT (SGPT) U/L 7   CrCl cannot be calculated (Unknown ideal weight.).  No results found for: AMMONIA  Results from last 7 days   Lab Units 01/01/20  0859   TROPONIN T ng/mL <0.010         No results found for: HGBA1C  No results found for: TSH, FREET4  No results found for: PREGTESTUR, PREGSERUM, HCG, HCGQUANT  Pain Management Panel     There is no flowsheet data to display.        No results found for: BLOODCX  No results found for: URINECX  No results found for: WOUNDCX  No results found for: STOOLCX        I have personally looked at the labs and they are stated above.  ---------------------------------------------------------------------------------------------------------------------  Imaging Results (Last 7 Days)     Procedure Component Value Units Date/Time    CT Angiogram Neck [[id number]] Resulted:  01/01/20 1037     Updated:  01/01/20 1037    CT Angiogram Head [[id number]] Resulted:  01/01/20 1037     Updated:  01/01/20 1037    XR Chest 1 View [[id number]] Collected:  01/01/20 0928     Updated:  01/01/20 0945    Narrative:         PROCEDURE: Single chest view AP    REASON FOR EXAM:Stroke Protocol (Onset > 12 hrs)    FINDINGS: Comparison exam dated October 27, 2015. Cardiac and  pulmonary vasculature are normal. Lungs are clear. Pleural spaces  are normal. No acute osseous abnormality.      Impression:       No acute cardiopulmonary abnormality.    Electronically signed by:  Quinn Triplett MD  1/1/2020 9:44 AM CST  Workstation: [id number]    CT Head Without Contrast Stroke Protocol [[id number]] Collected:  01/01/20 0908     Updated:  01/01/20 0940    Narrative:         PROCEDURE: CT head without contrast    REASON FOR EXAM: Stroke. Left-sided weakness    This exam was performed according to our departmental  dose-optimization program, which includes automated  exposure  control, adjustment of the mA and/or kV according to patient size  and/or use of iterative reconstruction technique.    FINDINGS: Comparison study dated October 27, 2015. Axial computer  tomography sequential imaging of the head was performed from the  vertex to the base of the skull. .Sagittal and coronal  reformation was performed .    The skull vault is intact. Paranasal sinuses and bilateral  mastoid air cells are well aerated. New small right frontal lobe  gray-white matter focus of hypodensity suspicious for acute  infarct. Bilateral frontal lobe periventricular deep white matter  small foci of hypodensity. Bilateral frontal lobe deep white  matter small circular foci of CSF attenuation with the largest  involving the left which measures 4.8 mm in greatest diameter.  Bilateral thalami small oval CSF attenuating lesions which are  sub-5 mm in size. Cerebral and cerebellar parenchymal are  otherwise unremarkable. Ventricular system and subarachnoid  spaces are normal.      Impression:       1.  New small right frontal lobe gray-white matter focus of  hypodensity suspicious for acute infarct.   2.  Bilateral frontal lobe periventricular deep white matter foci  of hypodensities most likely representing chronic ischemic  gliosis secondary to microvascular disease.  3.  Bilateral frontal lobe deep white matter old lacunar  infarcts.  4.  Bilateral thalami old lacunar infarct.  5.  ER clinician notified of findings by phone at 9:30 AM on  1/1/2020.    Electronically signed by:  Quinn Triplett MD  1/1/2020 9:39 AM [facility]  Workstation: [id number]          I have personally reviewed the radiology images and read the final radiology report.  ---------------------------------------------------------------------------------------------------------------------  Assessment:  Acute frontal lobe infarct  Leukocytosis probably due to bronchitis  Coronary artery disease  Peripheral vascular disease  Hypertension    Plan:   Place  patient in observation status.  Diet: Heart healthy diet.  Patient passed swallow evaluation.  Gentle rehydration with IV normal saline 75 cc per h  Check procalcitonin level.  Start patient on IV doxycycline.  Obtain blood cultures and urinalysis.  At home patient is already on aspirin and Plavix.  Will continue same  We will continue patient on Lipitor.  Follow-up with MRI result  Follow-up with 2D echo results with bubble studies.  PT/OT evaluation and management.  Patient blood pressure is adequately controlled we will continue with current home medications.  Appreciate telemetry neurology recommendation.      Prophylaxis  DVT.  -SCDs    Position:  Observation status.  Patient is full code  Patient spouse at the medical power of .  Anticipate discharge home probably with home health in the next 24 to 48 hours.  Plan of care was discussed with the patient.    Pawel Pratt MD  01/01/20  10:48 AM     Dragon disclaimer:  Much of this encounter note is an electronic transcription/translation of spoken language to printed text. The electronic translation of spoken language may permit erroneous, or at times, nonsensical words or phrases to be inadvertently transcribed; Although I have reviewed the note for such errors, some may still exist.                      Electronically signed by Pawel Pratt MD at 01/01/20 1517          Emergency Department Notes      Lior England MD at 01/01/20 0913      Procedure Orders    1. ECG 12 Lead [[id number]] ordered by Lior England MD at 01/01/20 0855                Subjective   64 years old male with history of COPD, hypertension, hyperlipidemia, stroke with some residual right-sided weakness presented in the ER with chief complaint of sudden onset headache yesterday followed by tingling and weakness of whole left side with getting off balance and was close to fall.  This happened around 10:30 AM yesterday.  After few minutes is  weakness started to improve but still have numbness left upper extremity/whole face more on the right side.  Patient denied any difficulty speech or visual symptoms.  Denies any chest pain palpitations or shortness of breath.  He has been taking Plavix from his previous stroke.      History provided by:  Patient  Stroke   Presenting symptoms: headaches, incoordination, loss of balance, sensory loss and weakness    Onset quality:  Sudden  Last known well:  1030, 12/31/2019  Timing:  Constant  Progression:  Partially resolved  Associated symptoms: paresthesias    Associated symptoms: no chest pain, no facial pain, no fever, no hearing loss, no bladder incontinence, no nausea, no neck pain, no vertigo and no vomiting        Review of Systems   Constitutional: Negative for fever.   HENT: Negative for congestion, hearing loss, sinus pressure, sneezing and sore throat.    Respiratory: Positive for cough. Negative for chest tightness and shortness of breath.    Cardiovascular: Negative for chest pain.   Gastrointestinal: Negative for abdominal pain, nausea and vomiting.   Genitourinary: Negative for bladder incontinence and frequency.   Musculoskeletal: Negative for neck pain.   Skin: Negative for color change.   Neurological: Positive for weakness, numbness, headaches, disturbances in coordination, paresthesias and loss of balance. Negative for vertigo.   Psychiatric/Behavioral: Negative for agitation.       History reviewed. No pertinent past medical history.    No Known Allergies    Past Surgical History:   Procedure Laterality Date   • ENDOSCOPY N/A 8/29/2017    Procedure: ESOPHAGOGASTRODUODENOSCOPY;  Surgeon: Van Khoury DO;  Location: [facility] ENDOSCOPY;  Service:    • ENDOSCOPY N/A 6/11/2019    Procedure: ESOPHAGOGASTRODUODENOSCOPY;  Surgeon: Van Khoury DO;  Location: [facility] ENDOSCOPY;  Service: Gastroenterology       History reviewed. No pertinent family history.    Social History     Socioeconomic  History   • Marital status:      Spouse name: Not on file   • Number of children: Not on file   • Years of education: Not on file   • Highest education level: Not on file           Objective   Physical Exam   Constitutional: He is oriented to person, place, and time. He appears well-developed and well-nourished.   HENT:   Head: Normocephalic and atraumatic.   Right Ear: External ear normal.   Left Ear: External ear normal.   Nose: Nose normal.   Mouth/Throat: Oropharynx is clear and moist.   Eyes: Pupils are equal, round, and reactive to light. Conjunctivae and EOM are normal.   Neck: Normal range of motion. Neck supple.   Cardiovascular: Normal rate, regular rhythm and normal heart sounds.   Pulmonary/Chest: Effort normal. No stridor. No respiratory distress. He has wheezes.   Abdominal: Soft. Bowel sounds are normal. He exhibits no distension. There is no tenderness.   Musculoskeletal: Normal range of motion. He exhibits no tenderness.   Neurological: He is alert and oriented to person, place, and time. He displays abnormal reflex. No cranial nerve deficit or sensory deficit. He exhibits normal muscle tone. Coordination normal. GCS eye subscore is 4. GCS verbal subscore is 5. GCS motor subscore is 6. He displays no Babinski's sign on the right side. He displays no Babinski's sign on the left side.   Reflex Scores:       Bicep reflexes are 2+ on the right side and 2+ on the left side.       Patellar reflexes are 2+ on the right side and 2+ on the left side.  Nursing note and vitals reviewed.      ECG 12 Lead    Date/Time: 1/1/2020 9:58 AM  Performed by: Lior England MD  Authorized by: Lior England MD   Interpreted by physician  Rhythm: sinus rhythm  Rate: normal  QRS axis: normal  ST Segments: ST segments normal  T Waves: T waves normal  Clinical impression: normal ECG                ED Course  ED Course as of Jan 01 1530   Wed Jan 01, 2020   0951 Patient does have acute right frontal infarct, discussed  with Dr. Ward at [facility] neurolog[facility], recommended CTA head and neck, aspirin, n.p.o. and admission to hospitalist service and patient would be evaluated soon.  Discussed with  and patient is accepted for admission.    [AN]   1013 Discussed with RONALD Farooq where patient wanted to go but got declined because of lack of services.    [AN]      ED Course User Index  [AN] Lior England MD                                       Labs Reviewed   COMPREHENSIVE METABOLIC PANEL - Abnormal; Notable for the following components:       Result Value    Creatinine 1.33 (*)     Chloride 97 (*)     Total Protein 8.7 (*)     eGFR Non  Amer 54 (*)     All other components within normal limits    Narrative:     GFR Normal >60  Chronic Kidney Disease <60  Kidney Failure <15     CBC WITH AUTO DIFFERENTIAL - Abnormal; Notable for the following components:    WBC 21.16 (*)     Hemoglobin 12.7 (*)     Neutrophil % 84.9 (*)     Lymphocyte % 6.6 (*)     Eosinophil % 0.2 (*)     Neutrophils, Absolute 17.98 (*)     Monocytes, Absolute 1.60 (*)     Immature Grans, Absolute 0.09 (*)     All other components within normal limits   PROTIME-INR - Normal    Narrative:     Therapeutic range for most indications is 2.0-3.0 INR,  or 2.5-3.5 for mechanical heart valves.   POCT GLUCOSE FINGERSTICK - Normal   RAINBOW DRAW    Narrative:     The following orders were created for panel order [place] Draw.  Procedure                               Abnormality         Status                     ---------                               -----------         ------                     Light Blue Top[[id number]]                                   In process                 Green Top (Gel)[[id number]]                                  In process                 Lavender Top[[id number]]                                     In process                 Gold Top - SST[[id number]]                                   In process                   Please  view results for these tests on the individual orders.   TROPONIN (IN-HOUSE)   POCT GLUCOSE FINGERSTICK   TYPE AND SCREEN   PREVIOUS HISTORY   CBC AND DIFFERENTIAL    Narrative:     The following orders were created for panel order CBC & Differential.  Procedure                               Abnormality         Status                     ---------                               -----------         ------                     CBC Auto Differential[[id number]]        Abnormal            Final result                 Please view results for these tests on the individual orders.   LIGHT BLUE TOP   GREEN TOP   LAVENDER TOP   GOLD TOP - SST       Xr Chest 1 View    Result Date: 1/1/2020  Narrative: PROCEDURE: Single chest view AP REASON FOR EXAM:Stroke Protocol (Onset > 12 hrs) FINDINGS: Comparison exam dated October 27, 2015. Cardiac and pulmonary vasculature are normal. Lungs are clear. Pleural spaces are normal. No acute osseous abnormality.     Impression: No acute cardiopulmonary abnormality. Electronically signed by:  Quinn Triplett MD  1/1/2020 9:44 AM CST Workstation: [id number]    Ct Head Without Contrast Stroke Protocol    Result Date: 1/1/2020  Narrative: PROCEDURE: CT head without contrast REASON FOR EXAM: Stroke. Left-sided weakness This exam was performed according to our departmental dose-optimization program, which includes automated exposure control, adjustment of the mA and/or kV according to patient size and/or use of iterative reconstruction technique. FINDINGS: Comparison study dated October 27, 2015. Axial computer tomography sequential imaging of the head was performed from the vertex to the base of the skull. .Sagittal and coronal reformation was performed . The skull vault is intact. Paranasal sinuses and bilateral mastoid air cells are well aerated. New small right frontal lobe gray-white matter focus of hypodensity suspicious for acute infarct. Bilateral frontal lobe periventricular deep white matter small  foci of hypodensity. Bilateral frontal lobe deep white matter small circular foci of CSF attenuation with the largest involving the left which measures 4.8 mm in greatest diameter. Bilateral thalami small oval CSF attenuating lesions which are sub-5 mm in size. Cerebral and cerebellar parenchymal are otherwise unremarkable. Ventricular system and subarachnoid spaces are normal.     Impression: 1.  New small right frontal lobe gray-white matter focus of hypodensity suspicious for acute infarct. 2.  Bilateral frontal lobe periventricular deep white matter foci of hypodensities most likely representing chronic ischemic gliosis secondary to microvascular disease. 3.  Bilateral frontal lobe deep white matter old lacunar infarcts. 4.  Bilateral thalami old lacunar infarct. 5.  ER clinician notified of findings by phone at 9:30 AM on 1/1/2020. Electronically signed by:  Quinn Triplett MD  1/1/2020 9:39 AM CST Workstation: [id number]              [facility]    Final diagnoses:   Cerebrovascular accident (CVA), unspecified mechanism (CMS/HCC)            Lior England MD  01/01/20 1532      Electronically signed by Lior England MD at 01/01/20 1532     Mariah Herr, RN at 01/01/20 1013        [facility] returned call. [facility]  states that Dr. Araiza refuses pt transfer. ER MD notified at this time.      Mariah Herr, RN  01/01/20 1015      Electronically signed by Mariah Herr, RN at 01/01/20 1015     Larisa Dallas at 01/01/20 1020        Rm 316 assigned at 1018 Tel called at 1018     Larisa Dallas  01/01/20 1020      Electronically signed by Larisa Dallas at 01/01/20 1020         Current Facility-Administered Medications   Medication Dose Route Frequency Provider Last Rate Last Dose   • aspirin chewable tablet 81 mg  81 mg Oral Daily Eleni Mendez MD   81 mg at 01/02/20 0802    Or   • aspirin suppository 300 mg  300 mg Rectal Daily Eleni Mendez MD       • atorvastatin (LIPITOR)  tablet 40 mg  40 mg Oral Nightly Eleni Mendez MD       • clopidogrel (PLAVIX) tablet 75 mg  75 mg Oral Daily Eleni Mendez MD   75 mg at 01/02/20 0802   • sodium chloride 0.9 % flush 10 mL  10 mL Intravenous Q12H Eleni Mendez MD   10 mL at 01/02/20 0802   • sodium chloride 0.9 % flush 10 mL  10 mL Intravenous PRN Eleni Mendez MD       • sodium chloride 0.9 % infusion  75 mL/hr Intravenous Continuous Eleni Mendez MD 75 mL/hr at 01/02/20 0926 75 mL/hr at 01/02/20 0926        Physician Progress Notes (last 24 hours) (Notes from 01/01/20 1119 through 01/02/20 1119)      Bernardino Ward MD at 01/02/20 0848          Stroke Progress Note       Chief Complaint:  L sided weakness    Subjective     Subjective:  Feeling better, but still has some L hand weakness. Per pt, he had lost about 20 lbs in last 2 months, as he wasn't getting much food from [employer] [place]. He has chronic cough from his smoking, but denies any blood in sputum. He has had blood in stool, but was diagnosed with hemorrhoids. Per nursing, he had GI work up which was negative.     Review of Systems   Constitutional: Positive for appetite change.        Wt loss of 21 lbs in 2 mths from not eating well   HENT: Negative.    Eyes: Negative.    Respiratory: Positive for cough.         Chronic cough   Cardiovascular: Negative.    Gastrointestinal: Negative.    Endocrine: Negative.    Genitourinary: Negative.    Musculoskeletal: Negative.    Allergic/Immunologic: Negative.         Objective      Temp:  [97.1 °F (36.2 °C)-98.1 °F (36.7 °C)] 98.1 °F (36.7 °C)  Heart Rate:  [86-96] 95  Resp:  [16-18] 16  BP: (105-158)/(56-73) 129/70    Neurological Exam  Mental Status  Awake, alert and oriented to person, place and time. Speech is normal. Language is fluent with no aphasia.    Cranial Nerves  CN II: Visual fields full to confrontation.  CN III, IV, VI: Extraocular movements intact bilaterally.  Normal lids and orbits bilaterally. Pupils equal round and reactive to light bilaterally.  CN V: Facial sensation is normal.  CN VII: Full and symmetric facial movement.  CN VIII: Hearing is normal.  CN IX, X: Palate elevates symmetrically. Normal gag reflex.  CN XI: Shoulder shrug strength is normal.  CN XII: Tongue midline without atrophy or fasciculations.    Motor  Decreased muscle bulk throughout. No fasciculations present. Normal muscle tone.  L hand weakness, but otherwise okay. Does have decreased muscle mass overall. .    Sensory  Light touch is normal in upper and lower extremities.     Reflexes  Not assessed.    Coordination  Finger-to-nose, rapid alternating movements and heel-to-shin normal bilaterally without dysmetria.    Gait  Not assessed.      Physical Exam   Constitutional:   malnourished   Eyes: Pupils are equal, round, and reactive to light. Lids are normal.   Neck: Normal range of motion.   Cardiovascular: Normal rate.   Pulmonary/Chest: Effort normal.   Neurological: Coordination normal.   Psychiatric: His speech is normal.       Results Review:    I reviewed the patient's new clinical results.    Lab Results (last 24 hours)     Procedure Component Value Units Date/Time    Lipid Panel [[id number]] Collected:  01/02/20 0527    Specimen:  Blood Updated:  01/02/20 0632     Total Cholesterol 119 mg/dL      Triglycerides 61 mg/dL      HDL Cholesterol 41 mg/dL      LDL Cholesterol  66 mg/dL      VLDL Cholesterol 12.2 mg/dL      LDL/HDL Ratio 1.60    Narrative:       Cholesterol Reference Ranges  (U.S. Department of Health and Human Services ATP III Classifications)    Desirable          <200 mg/dL  Borderline High    200-239 mg/dL  High Risk          >240 mg/dL      Triglyceride Reference Ranges  (U.S. Department of Health and Human Services ATP III Classifications)    Normal           <150 mg/dL  Borderline High  150-199 mg/dL  High             200-499 mg/dL  Very High        >500 mg/dL    HDL  Reference Ranges  (U.S. Department of Health and Human Services ATP III Classifcations)    Low     <40 mg/dl (major risk factor for CHD)  High    >60 mg/dl ('negative' risk factor for CHD)        LDL Reference Ranges  (U.S. Department of Health and Human Services ATP III Classifcations)    Optimal          <100 mg/dL  Near Optimal     100-129 mg/dL  Borderline High  130-159 mg/dL  High             160-189 mg/dL  Very High        >189 mg/dL    Hemoglobin A1c [[id number]]  (Normal) Collected:  01/02/20 0527    Specimen:  Blood Updated:  01/02/20 0626     Hemoglobin A1C 5.00 %     Narrative:       Hemoglobin A1C Ranges:    Increased Risk for Diabetes  5.7% to 6.4%  Diabetes                     >= 6.5%  Diabetic Goal                < 7.0%    CBC & Differential [[id number]] Collected:  01/02/20 0527    Specimen:  Blood Updated:  01/02/20 0613    Narrative:       The following orders were created for panel order CBC & Differential.  Procedure                               Abnormality         Status                     ---------                               -----------         ------                     CBC Auto Differential[[id number]]        Abnormal            Final result                 Please view results for these tests on the individual orders.    CBC Auto Differential [[id number]]  (Abnormal) Collected:  01/02/20 0527    Specimen:  Blood Updated:  01/02/20 0613     WBC 12.80 10*3/mm3      RBC 3.72 10*6/mm3      Hemoglobin 10.8 g/dL      Hematocrit 32.9 %      MCV 88.4 fL      MCH 29.0 pg      MCHC 32.8 g/dL      RDW 12.8 %      RDW-SD 41.4 fl      MPV 10.8 fL      Platelets 278 10*3/mm3      Neutrophil % 80.1 %      Lymphocyte % 9.5 %      Monocyte % 7.8 %      Eosinophil % 1.8 %      Basophil % 0.3 %      Immature Grans % 0.5 %      Neutrophils, Absolute 10.25 10*3/mm3      Lymphocytes, Absolute 1.22 10*3/mm3      Monocytes, Absolute 1.00 10*3/mm3      Eosinophils, Absolute 0.23 10*3/mm3      Basophils, Absolute  "0.04 10*3/mm3      Immature Grans, Absolute 0.06 10*3/mm3      nRBC 0.0 /100 WBC     POC Glucose Once [[id number]]  (Normal) Collected:  01/02/20 0051    Specimen:  Blood Updated:  01/02/20 0102     Glucose 85 mg/dL      Comment: RN NotifiedOperator: [id number] TAMMY NINONIFERMeter ID: [id number]       Procalcitonin [[id number]]  (Abnormal) Collected:  01/01/20 1101    Specimen:  Blood Updated:  01/01/20 2234     Procalcitonin 0.09 ng/mL     Narrative:       As a Marker for Sepsis (Non-Neonates):   1. <0.5 ng/mL represents a low risk of severe sepsis and/or septic shock.  1. >2 ng/mL represents a high risk of severe sepsis and/or septic shock.    As a Marker for Lower Respiratory Tract Infections that require antibiotic therapy:  PCT on Admission     Antibiotic Therapy             6-12 Hrs later  > 0.5                Strongly Recommended            >0.25 - <0.5         Recommended  0.1 - 0.25           Discouraged                   Remeasure/reassess PCT  <0.1                 Strongly Discouraged          Remeasure/reassess PCT      As 28 day mortality risk marker: \"Change in Procalcitonin Result\" (> 80 % or <=80 %) if Day 0 (or Day 1) and Day 4 values are available. Refer to http://[email or web address]-pct-calculator.com/   Change in PCT <=80 %   A decrease of PCT levels below or equal to 80 % defines a positive change in PCT test result representing a higher risk for 28-day all-cause mortality of patients diagnosed with severe sepsis or septic shock.  Change in PCT > 80 %   A decrease of PCT levels of more than 80 % defines a negative change in PCT result representing a lower risk for 28-day all-cause mortality of patients diagnosed with severe sepsis or septic shock.                  POC Glucose Once [[id number]]  (Normal) Collected:  01/01/20 1929    Specimen:  Blood Updated:  01/01/20 1944     Glucose 82 mg/dL      Comment: RN NotifiedOperator: [id number] MUNOZ JENNIFERMeter ID: [id number]       Urinalysis With Culture " If Indicated - Urine, Clean Catch [[id number]]  (Abnormal) Collected:  01/01/20 1804    Specimen:  Urine, Clean Catch Updated:  01/01/20 1819     Color, UA Yellow     Appearance, UA Clear     pH, UA <=5.0     Specific Gravity, UA 1.090     Comment: Result obtained by Refractometer        Glucose, UA Negative     Ketones, UA 40 mg/dL (2+)     Bilirubin, UA Negative     Blood, UA Negative     Protein, UA Negative     Leuk Esterase, UA Negative     Nitrite, UA Negative     Urobilinogen, UA 1.0 E.U./dL    Narrative:       Urine microscopic not indicated.    POC Glucose Once [[id number]]  (Normal) Collected:  01/01/20 1710    Specimen:  Blood Updated:  01/01/20 1721     Glucose 80 mg/dL      Comment: : [id number] JANET SANCHEZMeter ID: [id number]       Blood Culture - Blood, Arm, Right [[id number]] Collected:  01/01/20 1240    Specimen:  Blood from Arm, Right Updated:  01/01/20 1254    Blood Culture - Blood, Arm, Left [[id number]] Collected:  01/01/20 1101    Specimen:  Blood from Arm, Left Updated:  01/01/20 1101    Troponin [[id number]]  (Normal) Collected:  01/01/20 0859    Specimen:  Blood Updated:  01/01/20 1007     Troponin T <0.010 ng/mL     Narrative:       Troponin T Reference Range:  <= 0.03 ng/mL-   Negative for AMI  >0.03 ng/mL-     Abnormal for myocardial necrosis.  Clinicians would have to utilize clinical acumen, EKG, Troponin and serial changes to determine if it is an Acute Myocardial Infarction or myocardial injury due to an underlying chronic condition.     [place] Draw [[id number]] Collected:  01/01/20 0859    Specimen:  Blood Updated:  01/01/20 1000    Narrative:       The following orders were created for panel order [place] Draw.  Procedure                               Abnormality         Status                     ---------                               -----------         ------                     Light Blue Top[[id number]]                                   Final result               Green  Top (Gel)[[id number]]                                  Final result               Lavender Top[[id number]]                                     Final result               Gold Top - SST[[id number]]                                   Final result                 Please view results for these tests on the individual orders.    Gold Top - SST [[id number]] Collected:  01/01/20 0859    Specimen:  Blood Updated:  01/01/20 1000     Extra Tube Hold for add-ons.     Comment: Auto resulted.       Light Blue Top [[id number]] Collected:  01/01/20 0859    Specimen:  Blood Updated:  01/01/20 1000     Extra Tube hold for add-on     Comment: Auto resulted       Green Top (Gel) [[id number]] Collected:  01/01/20 0859    Specimen:  Blood Updated:  01/01/20 1000     Extra Tube Hold for add-ons.     Comment: Auto resulted.       Lavender Top [[id number]] Collected:  01/01/20 0859    Specimen:  Blood Updated:  01/01/20 1000     Extra Tube hold for add-on     Comment: Auto resulted       Comprehensive Metabolic Panel [[id number]]  (Abnormal) Collected:  01/01/20 0859    Specimen:  Blood Updated:  01/01/20 0930     Glucose 95 mg/dL      BUN 16 mg/dL      Creatinine 1.33 mg/dL      Sodium 138 mmol/L      Potassium 4.0 mmol/L      Chloride 97 mmol/L      CO2 26.0 mmol/L      Calcium 9.4 mg/dL      Total Protein 8.7 g/dL      Albumin 4.10 g/dL      ALT (SGPT) 7 U/L      AST (SGOT) 13 U/L      Alkaline Phosphatase 102 U/L      Total Bilirubin 0.6 mg/dL      eGFR Non African Amer 54 mL/min/1.73      Globulin 4.6 gm/dL      A/G Ratio 0.9 g/dL      BUN/Creatinine Ratio 12.0     Anion Gap 15.0 mmol/L     Narrative:       GFR Normal >60  Chronic Kidney Disease <60  Kidney Failure <15      Protime-INR [[id number]]  (Normal) Collected:  01/01/20 0859    Specimen:  Blood Updated:  01/01/20 0919     Protime 14.9 Seconds      INR 1.19    Narrative:       Therapeutic range for most indications is 2.0-3.0 INR,  or 2.5-3.5 for mechanical heart valves.    CBC &  Differential [[id number]] Collected:  01/01/20 0859    Specimen:  Blood Updated:  01/01/20 0908    Narrative:       The following orders were created for panel order CBC & Differential.  Procedure                               Abnormality         Status                     ---------                               -----------         ------                     CBC Auto Differential[[id number]]        Abnormal            Final result                 Please view results for these tests on the individual orders.    CBC Auto Differential [[id number]]  (Abnormal) Collected:  01/01/20 0859    Specimen:  Blood Updated:  01/01/20 0908     WBC 21.16 10*3/mm3      RBC 4.30 10*6/mm3      Hemoglobin 12.7 g/dL      Hematocrit 38.9 %      MCV 90.5 fL      MCH 29.5 pg      MCHC 32.6 g/dL      RDW 12.9 %      RDW-SD 42.6 fl      MPV 10.4 fL      Platelets 366 10*3/mm3      Neutrophil % 84.9 %      Lymphocyte % 6.6 %      Monocyte % 7.6 %      Eosinophil % 0.2 %      Basophil % 0.3 %      Immature Grans % 0.4 %      Neutrophils, Absolute 17.98 10*3/mm3      Lymphocytes, Absolute 1.39 10*3/mm3      Monocytes, Absolute 1.60 10*3/mm3      Eosinophils, Absolute 0.04 10*3/mm3      Basophils, Absolute 0.06 10*3/mm3      Immature Grans, Absolute 0.09 10*3/mm3      nRBC 0.0 /100 WBC     POC Glucose Once [[id number]]  (Normal) Collected:  01/01/20 0851    Specimen:  Blood Updated:  01/01/20 0908     Glucose 92 mg/dL      Comment: : [id number] CYNDEE [place] ID: [id number]           Ct Angiogram Head    Result Date: 1/1/2020  1.  CTA findings as described above. Electronically signed by:  Quinn Triplett MD  1/1/2020 11:47 AM CST Workstation: [id number]    Ct Angiogram Neck    Result Date: 1/1/2020  1.  CTA findings as described above. Electronically signed by:  Quinn Triplett MD  1/1/2020 11:47 AM CST Workstation: [id number]    Mri Brain Without Contrast    Result Date: 1/1/2020  Acute/subacute infarct  on the right corresponding to the described  same day CT hypodensity predominantly extending posteriorly and inferiorly to the level of the central sulcus measuring approximately 1.3 x 3.6 cm in its AP and transverse dimensions respectively. No associated gradient susceptibility or midline shift. Redemonstrated moderate sequela of chronic microvascular disease involving the cerebral white matter and remote lacunar infarcts involving the thalami and cerebral white matter. Report of findings given to Marisel GALINDO, nurse caring for this patient on 1/1/2020 at 2:55 PM CST. She will notify Dr. Ward of these results. Electronically signed by:  Ja Bender MD  1/1/2020 3:10 PM CST Workstation: [id number]    Xr Chest 1 View    Result Date: 1/1/2020  No acute cardiopulmonary abnormality. Electronically signed by:  Quinn Triplett MD  1/1/2020 9:44 AM CST Workstation: [id number]    Ct Head Without Contrast Stroke Protocol    Result Date: 1/1/2020  1.  New small right frontal lobe gray-white matter focus of hypodensity suspicious for acute infarct. 2.  Bilateral frontal lobe periventricular deep white matter foci of hypodensities most likely representing chronic ischemic gliosis secondary to microvascular disease. 3.  Bilateral frontal lobe deep white matter old lacunar infarcts. 4.  Bilateral thalami old lacunar infarct. 5.  ER clinician notified of findings by phone at 9:30 AM on 1/1/2020. Electronically signed by:  Quinn Triplett MD  1/1/2020 9:39 AM CST Workstation: [id number]    Results for orders placed during the hospital encounter of 01/01/20   Adult Transthoracic Echo Complete W/ Cont if Necessary Per Protocol    Narrative · The study is technically difficult for diagnosis.  · Left ventricular systolic function is mildly decreased. LVEF: 48%. Mild   global hypokinesis. Grade one diastolic dysfunction.  · Right ventricle systolic function is normal.  · No evidence of a patent foramen ovale. No evidence of an atrial septal   defect present. Saline test results are negative.  · No  intracardiac thrombus or mass.            Assessment/Plan     Assessment/Plan:  65 LHWM with h/o HTN, CAD, PAD, here with L sided wekaness, which has improved since it started. Still has some L hand weakness, but feels much better. His MRI brain showed embolic looking RMCA stroke. Mod WMD, incl multiple old lacunar strokes. CTA head/neck looked okay.       1. RMCA stroke- Unknown etiology. He had 2D echo, which showed mild global hypokinesis, but no significant cardiac findings. He had recently started taking ASA/plavix every other day instead of daily, because of bruising. Will resume it back to ASA 81 mg and plavix 75mg daily, along with lipitor 40mg daily. A1C-5, LDL-66. Will get STACY today to look for other cardiac source. He has had weight loss of 21 lbs in 2 mths, but it is possibly from decreased dietary intake. Recommend him to f/u closely with PCP, and if he does develop blood in sputum, to report it to PCP.   2. HTN- Normal BP goals. Okay to start home BP meds.   3. CAD/PAD- starting ASA and plavix along with statins.   4. Leukocytosis- Management as per hospitalist. Improved.   5. Activity- PT/OT eval today.   6. Diet- Health heart, high protein diet. Encourage to gain weight.   7. Disposition- If STACY is negative, and he clears therapy eval, poss dc home today.     Case was d/w pt, nursing and hosptialist. Thank you for the consult.           Bernardino Ward MD  01/02/20  8:48 AM    This was an audio and video enabled telemedicine encounter.      Electronically signed by Bernardino Ward MD at 01/02/20 0914          Consult Notes (last 24 hours) (Notes from 01/01/20 1119 through 01/02/20 1119)      Juliet Kimball APRN at 01/02/20 0858      Consult Orders    1. Inpatient Cardiology Consult [[id number]] ordered by Bernardino Ward MD at 01/02/20 0812           Attestation signed by Eleni Mendez MD at 01/02/20 0977    I have reviewed the documentation above and agree with  assessment and plan as outlined by RENALDO Velarde.  History and events reviewed.  Mr. Montilla is a 64-year-old male with history of hypertension, hyperlipidemia, previous cerebrovascular event, peripheral vascular disease, coronary artery disease with myocardial infarction in 2006 who presented with a cerebrovascular event.  Evaluated by neurology and embolic etiology suspected.  No previous history of atrial fibrillation.  No chest pain.  EKG does not show atrial fibrillation.  Transthoracic echocardiogram showed EF of 48% with no intracardiac thrombus.  Mild global hypokinesis.  No PFO noted.  No significant valve abnormalities.  On examination pulse rate was 80 bpm regular blood pressure 150/70 mmHg  Exam of the heart showed normal first and second heart sounds with no S3 or S4.  Lung exam showed normal breath sounds with no rales or rhonchi.  Abdomen was soft with no mass or tenderness.  Periphery showed no edema.    Plan would be to proceed with a transesophageal echocardiogram as recommended by neurology.  All risks and benefits of the procedure explained to him in detail and he will be ASA II and Mallampati II.  Further recommendations will follow.  Thank you for asked me to see this patient.                        [facility] Cardiology Consult    Referring Provider: Lior England MD    Reason for Consultation: STACY consult    Patient Care Team:  Lorenzo Mondragon MD as PCP - General    Chief complaint   Chief Complaint   Patient presents with   • Stroke       Subjective .     History of present illness:     Cardiology consultation for STACY secondary to CVA. He has had CVA in the past with left sided weakness. This CVA resulted in left sided weakness. He does not appear to have paralytic deficits, only fine motor movements, including eating. [facility] He takes Plavix for CAD with MI in 2006. He does not have teeth or wear dentures. No swallowing deficit with supper last night.       The CVD Risk  score (ELVERAgostino, et al., 2008) failed to calculate for the following reasons:    The patient has a prior MI, stroke, CHF, or peripheral vascular disease diagnosis      Review of Systems  Review of Systems   Constitutional: Negative for diaphoresis, fatigue, fever and unexpected weight change.   Respiratory: Negative for cough, chest tightness, shortness of breath and wheezing.    Cardiovascular: Negative for chest pain, palpitations and leg swelling.   Gastrointestinal: Negative for abdominal distention and blood in stool.   Genitourinary: Negative for hematuria.   Musculoskeletal: Negative for arthralgias and myalgias.   Skin: Negative for pallor and rash.   Neurological: Negative for dizziness, syncope and weakness.   Hematological: Does not bruise/bleed easily.   Psychiatric/Behavioral: Negative for confusion. The patient is not nervous/anxious.        History  Past Medical History:   Diagnosis Date   • Elevated cholesterol    • Hypertension    ,   Past Surgical History:   Procedure Laterality Date   • ENDOSCOPY N/A 8/29/2017    Procedure: ESOPHAGOGASTRODUODENOSCOPY;  Surgeon: Van Khoury DO;  Location: [facility] ENDOSCOPY;  Service:    • ENDOSCOPY N/A 6/11/2019    Procedure: ESOPHAGOGASTRODUODENOSCOPY;  Surgeon: Van Khoury DO;  Location: [facility] ENDOSCOPY;  Service: Gastroenterology   , History reviewed. No pertinent family history.,   Social History     Tobacco Use   • Smoking status: Former Smoker   • Smokeless tobacco: Never Used   Substance Use Topics   • Alcohol use: Not on file   • Drug use: Not on file   ,   Medications Prior to Admission   Medication Sig Dispense Refill Last Dose   • albuterol (ACCUNEB) 1.25 MG/3ML nebulizer solution Take 1 ampule by nebulization Every 6 (Six) Hours As Needed for Wheezing.   6/10/2019 at Unknown time   • albuterol (PROVENTIL HFA;VENTOLIN HFA) 108 (90 Base) MCG/ACT inhaler Inhale 2 puffs Every 4 (Four) Hours As Needed for Wheezing.   6/10/2019 at Unknown time  "  • aspirin 81 MG chewable tablet Chew 81 mg Daily.   1/1/2020 at Unknown time   • atenolol (TENORMIN) 50 MG tablet Take 50 mg by mouth Daily.   1/1/2020 at Unknown time   • atorvastatin (LIPITOR) 80 MG tablet Take 80 mg by mouth Daily.   1/1/2020 at Unknown time   • budesonide-formoterol (SYMBICORT) 160-4.5 MCG/ACT inhaler Inhale 2 puffs 2 (Two) Times a Day.   6/10/2019 at Unknown time   • clopidogrel (PLAVIX) 75 MG tablet Take 75 mg by mouth Daily.   1/1/2020 at Unknown time   • cyclobenzaprine (FLEXERIL) 10 MG tablet Take 10 mg by mouth 3 (Three) Times a Day As Needed for Muscle Spasms.   6/10/2019 at Unknown time   • docusate sodium (COLACE) 100 MG capsule Take 100 mg by mouth 2 (Two) Times a Day.   1/1/2020 at Unknown time   • gabapentin (NEURONTIN) 400 MG capsule Take 400 mg by mouth 3 (Three) Times a Day.   1/1/2020 at Unknown time   • hydrOXYzine (ATARAX) 25 MG tablet Take 25 mg by mouth 3 (Three) Times a Day As Needed for Itching.   6/10/2019 at Unknown time   • sertraline (ZOLOFT) 100 MG tablet Take 100 mg by mouth Daily.   1/1/2020 at Unknown time      ALLERGIES  Contrast dye    Objective     Vital Sign Min/Max for last 24 hours  Temp  Min: 97.1 °F (36.2 °C)  Max: 98.1 °F (36.7 °C)   BP  Min: 105/59  Max: 158/65   Pulse  Min: 86  Max: 96   Resp  Min: 16  Max: 18   SpO2  Min: 94 %  Max: 100 %   No data recorded   Weight  Min: 61.5 kg (135 lb 8 oz)  Max: 67.1 kg (148 lb)     Flowsheet Rows      First Filed Value   Admission Height  185.4 cm (73\") Documented at 01/01/2020 1126   Admission Weight  67.1 kg (148 lb) Documented at 01/01/2020 1126             Physical Exam:  Physical Exam   Constitutional: He is oriented to person, place, and time. He appears well-developed and well-nourished. No distress.   HENT:   Head: Normocephalic.   Eyes: Conjunctivae are normal.   Neck: No JVD present.   Cardiovascular: Normal rate, regular rhythm, S1 normal, S2 normal, normal heart sounds and intact distal pulses. Exam " reveals no gallop and no friction rub.   No murmur heard.  Pulmonary/Chest: Effort normal and breath sounds normal. No respiratory distress. He has no wheezes. He has no rales.   Abdominal: Soft. Bowel sounds are normal. He exhibits no distension.   Musculoskeletal: Normal range of motion. He exhibits no edema.   Neurological: He is alert and oriented to person, place, and time.   Skin: Skin is warm and dry. He is not diaphoretic. No erythema.   Psychiatric: He has a normal mood and affect. His behavior is normal. Judgment and thought content normal.   Nursing note and vitals reviewed.         Results Review:     Results from last 7 days   Lab Units 01/01/20  0859   SODIUM mmol/L 138   POTASSIUM mmol/L 4.0   CHLORIDE mmol/L 97*   CO2 mmol/L 26.0   BUN mg/dL 16   CREATININE mg/dL 1.33*   CALCIUM mg/dL 9.4   BILIRUBIN mg/dL 0.6   ALK PHOS U/L 102   ALT (SGPT) U/L 7   AST (SGOT) U/L 13   GLUCOSE mg/dL 95       Estimated Creatinine Clearance: 48.8 mL/min (A) (by C-G formula based on SCr of 1.33 mg/dL (H)).          Results from last 7 days   Lab Units 01/02/20  0527 01/01/20  0859   WBC 10*3/mm3 12.80* 21.16*   HEMOGLOBIN g/dL 10.8* 12.7*   PLATELETS 10*3/mm3 278 366       Results from last 7 days   Lab Units 01/01/20  0859   INR  1.19       Lab Results   Component Value Date    TROPONINT <0.010 01/01/2020     No results found for: PROBNP    I/O last 3 completed shifts:  In: 2163.8 [P.O.:1080; I.V.:1083.8]  Out: 400 [Urine:400]    Cardiographics    Results for orders placed during the hospital encounter of 01/01/20   Adult Transthoracic Echo Complete W/ Cont if Necessary Per Protocol    Narrative · The study is technically difficult for diagnosis.  · Left ventricular systolic function is mildly decreased. LVEF: 48%. Mild   global hypokinesis. Grade one diastolic dysfunction.  · Right ventricle systolic function is normal.  · No evidence of a patent foramen ovale. No evidence of an atrial septal   defect present. Saline  test results are negative.  · No intracardiac thrombus or mass.          Ct Angiogram Head    Result Date: 1/1/2020  1.  CTA findings as described above. Electronically signed by:  Quinn Triplett MD  1/1/2020 11:47 AM CST Workstation: [id number]    Ct Angiogram Neck    Result Date: 1/1/2020  1.  CTA findings as described above. Electronically signed by:  Quinn Triplett MD  1/1/2020 11:47 AM CST Workstation: [id number]    Mri Brain Without Contrast    Result Date: 1/1/2020  Acute/subacute infarct  on the right corresponding to the described same day CT hypodensity predominantly extending posteriorly and inferiorly to the level of the central sulcus measuring approximately 1.3 x 3.6 cm in its AP and transverse dimensions respectively. No associated gradient susceptibility or midline shift. Redemonstrated moderate sequela of chronic microvascular disease involving the cerebral white matter and remote lacunar infarcts involving the thalami and cerebral white matter. Report of findings given to Marisel GALINDO, nurse caring for this patient on 1/1/2020 at 2:55 PM CST. She will notify Dr. Ward of these results. Electronically signed by:  Ja Bender MD  1/1/2020 3:10 PM CST Workstation: [id number]    Xr Chest 1 View    Result Date: 1/1/2020  No acute cardiopulmonary abnormality. Electronically signed by:  Quinn Triplett MD  1/1/2020 9:44 AM CST Workstation: [id number]    Ct Head Without Contrast Stroke Protocol    Result Date: 1/1/2020  1.  New small right frontal lobe gray-white matter focus of hypodensity suspicious for acute infarct. 2.  Bilateral frontal lobe periventricular deep white matter foci of hypodensities most likely representing chronic ischemic gliosis secondary to microvascular disease. 3.  Bilateral frontal lobe deep white matter old lacunar infarcts. 4.  Bilateral thalami old lacunar infarct. 5.  ER clinician notified of findings by phone at 9:30 AM on 1/1/2020. Electronically signed by:  Quinn Triplett MD  1/1/2020 9:39 AM CST  Workstation: [id number]      Intake/Output       01/01/20 0700 - 01/02/20 0659 01/02/20 0700 - 01/03/20 0659    Intake (ml) 2163.8 --    Output (ml) 400 100    Net (ml) 1763.8 -100    Last Weight  61.5 kg (135 lb 8 oz)  --            Assessment/Plan       Cerebrovascular accident (CVA) (CMS/[facility])    STACY Pre-Op:     Screening for STACY relative and absolute contraindications was performed.  The patient denies a history of dysphagia, odontophagia, DDS final radiation, recent upper gastrointestinal surgery, recent esophagitis, thoracic aortic aneurysm.  The patient denies esophageal pathology including strictures, tumor, diverticulum, known varices or esophagitis.  The patient's dental status: Edentulous. The patient is currently utilizing salicylates, anticoagulants or antiplatelets. The patient is currently using Clopidogrel (Plavix).     A STACY was recommended to the patient with MAC local IV sedation. The indications and risks/benefits were discussed. This included risks of inadvertent tracheal intubation, hematoma, oropharyngeal bleeding, esophageal perforation.  The patient agreed to proceeding.  The risks of conscious sedation were also reviewed including allergy, anaphylaxis and hypoventilation requiring mechanical ventilation.  A hand out was also provided to the patient explaining the procedure.      STACY now with Dr. Mendez. Not likely cardioembolic in nature, but will rule out with STACY.  Need outpatient event monitor/14 day monitor to evaluate for arrhythmias.       I discussed the patients findings and my recommendations with patient and Dr. Mendez              This document has been electronically signed by RENALDO Guy on January 2, 2020 8:53 AM                  Electronically signed by Eleni Mendez MD at 01/02/20 0919     eBrnardino Ward MD at 01/01/20 1119      Consult Orders    1. Inpatient Neurology Consult Stroke [[id number]] ordered by Lior England MD at 01/01/20 0928                 Stroke Consult Note    Patient Name: Aubrey Montilla   MRN: [id number]  Age: 64 y.o.  Sex: male  : 1955    Primary Care Physician: Lorenzo Mondragon MD  Referring Physician:  Lior England MD    TIME STROKE TEAM CALLED: 1041 EST     TIME PATIENT SEEN: 1141 EST    Handedness: Left  Race: White     Chief Complaint/Reason for Consultation: L sided weakness    HPI: 64 yo LHWM with h/o HTN, CAD s/p 2 stents, PAD s/p RLE stent, stroke with no significant residual, on aspirin and plavix- who had sudden onset of dizziness and L sided weakness yest at around 1100 CST- and symptoms has improved in last 24 hrs. Pt didn't come yest, as he had to take care of his disabled wife, and today his step-daughter brought him to hospital. He still has some L hand weakness, but has improved significantly. Pt was having bruising on his body, for which his PCP asked pt to start taking ASA and plavix every other day- which he is taking since last 1 mth.        Last Known Normal Date/Time: 2019, 1100 CST     Review of Systems   Constitutional: Negative.         Has been taking ASA and plavix every other day for last month   HENT: Negative.    Eyes: Negative.    Respiratory: Positive for cough.    Cardiovascular: Negative.    Gastrointestinal: Negative.    Endocrine: Negative.    Genitourinary: Negative.    Musculoskeletal: Negative.    Allergic/Immunologic: Negative.    Hematological: Negative.    Psychiatric/Behavioral: Negative.         Temp:  [97.5 °F (36.4 °C)] 97.5 °F (36.4 °C)  Heart Rate:  [94-97] 94  Resp:  [20] 20  BP: (107-146)/(59-63) 107/59    Neurological Exam  Mental Status  Awake, alert and oriented to person, place and time. Speech is normal. Language is fluent with no aphasia.    Cranial Nerves  CN II: Visual fields full to confrontation.  CN III, IV, VI: Extraocular movements intact bilaterally.  CN V: Facial sensation is normal.  CN VII: Full and symmetric facial movement.  CN VIII: Hearing is  normal.  CN IX, X: Palate elevates symmetrically  CN XI: Shoulder shrug strength is normal.  CN XII: Tongue midline without atrophy or fasciculations.    Motor  Normal muscle bulk throughout. No fasciculations present.  L pronator drift, axising around L arm, L hand weak. LLE seems strong.   RUE/RLE normal.    Sensory  Light touch is normal in upper and lower extremities.     Reflexes  Not assessed.    Coordination  Finger-to-nose, rapid alternating movements and heel-to-shin normal bilaterally without dysmetria.    Gait  Not assessed.      Physical Exam   Constitutional:   Malnourished. BMI-19.   Neck: Normal range of motion.   Cardiovascular: Normal rate.   Pulmonary/Chest: Effort normal.   Neurological: Coordination normal.   Psychiatric: His speech is normal.       Acute Stroke Data    Alteplase (tPA) Inclusion / Exclusion Criteria    Time: 11:20 AM  Person Administering Scale: Bernardino Ward MD    Inclusion Criteria  [x]   18 years of age or greater   []   Onset of symptoms < 4.5 hours before beginning treatment (stroke onset = time patient was last seen well or without symptoms).   [x]   Diagnosis of acute ischemic stroke causing measurable disabling deficit (Complete Hemianopia, Any Aphasia, Visual or Sensory Extinction, Any weakness limiting sustained effort against gravity)   []   Any remaining deficit considered potentially disabling in view of patient and practitioner   Exclusion criteria (Do not proceed with Alteplase if any are checked under exclusion criteria)  [x]   Onset unknown or GREATER than 4.5 hours   []   ICH on CT/MRI   [x]   CT demonstrates hypodensity representing acute or subacute infarct   []   Significant head trauma or prior stroke in the previous 3 months   []   Symptoms suggestive of subarachnoid hemorrhage   []   History of un-ruptured intracranial aneurysm GREATER than 10 mm   []   Recent intracranial or intraspinal surgery within the last 3 months   []   Arterial puncture at  a non-compressible site in the previous 7 days   []   Active internal bleeding   []   Acute bleeding tendency   []   Platelet count LESS than 100,000 for known hematological diseases such as leukemia, thrombocytopenia or chronic cirrhosis   []   Current use of anticoagulant with INR GREATER than 1.7 or PT GREATER than 15 seconds, aPTT GREATER than 40 seconds   []   Heparin received within 48 hours, resulting in abnormally elevated aPTT GREATER than upper limit of normal   []   Current use of direct thrombin inhibitors or direct factor Xa inhibitors in the past 48 hours   []   Elevated blood pressure refractory to treatment (systolic GREATER than 185 mm/Hg or diastolic  GREATER than 110 mm/Hg   []   Suspected infective endocarditis and aortic arch dissection   []   Current use of therapeutic treatment dose of low-molecular-weight heparin (LMWH) within the previous 24 hours   []   Structural GI malignancy or bleed   Relative exclusion for all patients  []   Only minor non-disabling symptoms   []   Pregnancy   []   Seizure at onset with postictal residual neurological impairments   []   Major surgery or previous trauma within past 14 days   []   History of previous spontaneous ICH, intracranial neoplasm, or AV malformation   []   Postpartum (within previous 14 days)   []   Recent GI or urinary tract hemorrhage (within previous 21 days)   []   Recent acute MI (within previous 3 months)   []   History of un-ruptured intracranial aneurysm LESS than 10 mm   []   History of ruptured intracranial aneurysm   []   Blood glucose LESS than 50 mg/dL (2.7 mmol/L)   []   Dural puncture within the last 7 days   []   Known GREATER than 10 cerebral microbleeds   Additional exclusions for patients with symptoms onset between 3 and 4.5 hours.  []   Age > 80.   []   On any anticoagulants regardless of INR  >>> Warfarin (Coumadin), Heparin, Enoxaparin (Lovenox), fondaparinux (Arixtra), bivalirudin (Angiomax), Argatroban, dabigatran  (Pradaxa), rivaroxaban (Xarelto), or apixaban (Eliquis)   []   Severe stroke (NIHSS > 25).   []   History of BOTH diabetes and previous ischemic stroke.   []   The risks and benefits have been discussed with the patient or family related to the administration of IV Alteplase for stroke symptoms.   []   I have discussed and reviewed the patient's case and imaging with the attending prior to IV Alteplase.    Time Alteplase administered       History reviewed. No pertinent past medical history.  Past Surgical History:   Procedure Laterality Date   • ENDOSCOPY N/A 8/29/2017    Procedure: ESOPHAGOGASTRODUODENOSCOPY;  Surgeon: Van Khoury DO;  Location: [facility] ENDOSCOPY;  Service:    • ENDOSCOPY N/A 6/11/2019    Procedure: ESOPHAGOGASTRODUODENOSCOPY;  Surgeon: Van Khoury DO;  Location: [facility] ENDOSCOPY;  Service: Gastroenterology     History reviewed. No pertinent family history.  Social History     Socioeconomic History   • Marital status:      Spouse name: Not on file   • Number of children: Not on file   • Years of education: Not on file   • Highest education level: Not on file     No Known Allergies  Prior to Admission medications    Medication Sig Start Date End Date Taking? Authorizing Provider   albuterol (ACCUNEB) 1.25 MG/3ML nebulizer solution Take 1 ampule by nebulization Every 6 (Six) Hours As Needed for Wheezing.    Star Raman MD   albuterol (PROVENTIL HFA;VENTOLIN HFA) 108 (90 Base) MCG/ACT inhaler Inhale 2 puffs Every 4 (Four) Hours As Needed for Wheezing.    Star Raman MD   atenolol (TENORMIN) 50 MG tablet Take 50 mg by mouth Daily.    Star Raman MD   atorvastatin (LIPITOR) 80 MG tablet Take 80 mg by mouth Daily.    Star Raman MD   budesonide-formoterol (SYMBICORT) 160-4.5 MCG/ACT inhaler Inhale 2 puffs 2 (Two) Times a Day.    Star Raman MD   clopidogrel (PLAVIX) 75 MG tablet Take 75 mg by mouth Daily.    Star Raman MD    cyclobenzaprine (FLEXERIL) 10 MG tablet Take 10 mg by mouth 3 (Three) Times a Day As Needed for Muscle Spasms.    Star Raman MD   docusate sodium (COLACE) 100 MG capsule Take 100 mg by mouth 2 (Two) Times a Day.    Star Raman MD   gabapentin (NEURONTIN) 400 MG capsule Take 400 mg by mouth 3 (Three) Times a Day.    Star Raman MD   hydrOXYzine (ATARAX) 25 MG tablet Take 25 mg by mouth 3 (Three) Times a Day As Needed for Itching.    Star Raman MD   sertraline (ZOLOFT) 100 MG tablet Take 100 mg by mouth Daily.    Star Raman MD       Hospital Meds:  Scheduled-   doxycycline 100 mg Intravenous Once     Infusions-   sodium chloride 125 mL/hr Last Rate: 125 mL/hr (20 1037)      PRNs- sodium chloride    Functional Status Prior to Current Stroke/Kumar Score: 0    NIH Stroke Scale  Time: 11:20 AM  Person Administering Scale: Bernardino Ward MD    1a  Level of consciousness: 0=alert; keenly responsive   1b. LOC questions:  0=Performs both tasks correctly   1c. LOC commands: 0=Performs both tasks correctly   2.  Best Gaze: 0=normal   3.  Visual: 0=No visual loss   4. Facial Palsy: 0=Normal symmetric movement   5a.  Motor left arm: 1=Drift, limb holds 90 (or 45) degrees but drifts down before full 10 seconds: does not hit bed   5b.  Motor right arm: 0=No drift, limb holds 90 (or 45) degrees for full 10 seconds   6a. motor left le=No drift, limb holds 90 (or 45) degrees for full 10 seconds   6b  Motor right le=No drift, limb holds 90 (or 45) degrees for full 10 seconds   7. Limb Ataxia: 0=Absent   8.  Sensory: 0=Normal; no sensory loss   9. Best Language:  0=No aphasia, normal   10. Dysarthria: 0=Normal   11. Extinction and Inattention: 0=No abnormality    Total:   1       Results Reviewed:  I have personally reviewed current lab, radiology, and data and agree with results.  Lab Results (last 24 hours)     Procedure Component Value Units Date/Time     Procalcitonin [[id number]] Collected:  01/01/20 1101    Specimen:  Blood Updated:  01/01/20 1101    Blood Culture - Blood, Arm, Left [[id number]] Collected:  01/01/20 1101    Specimen:  Blood from Arm, Left Updated:  01/01/20 1101    Troponin [[id number]]  (Normal) Collected:  01/01/20 0859    Specimen:  Blood Updated:  01/01/20 1007     Troponin T <0.010 ng/mL     Narrative:       Troponin T Reference Range:  <= 0.03 ng/mL-   Negative for AMI  >0.03 ng/mL-     Abnormal for myocardial necrosis.  Clinicians would have to utilize clinical acumen, EKG, Troponin and serial changes to determine if it is an Acute Myocardial Infarction or myocardial injury due to an underlying chronic condition.     [place] Draw [[id number]] Collected:  01/01/20 0859    Specimen:  Blood Updated:  01/01/20 1000    Narrative:       The following orders were created for panel order [place] Draw.  Procedure                               Abnormality         Status                     ---------                               -----------         ------                     Light Blue Top[[id number]]                                   Final result               Green Top (Gel)[[id number]]                                  Final result               Lavender Top[[id number]]                                     Final result               Gold Top - SST[[id number]]                                   Final result                 Please view results for these tests on the individual orders.    Gold Top - SST [[id number]] Collected:  01/01/20 0859    Specimen:  Blood Updated:  01/01/20 1000     Extra Tube Hold for add-ons.     Comment: Auto resulted.       Light Blue Top [[id number]] Collected:  01/01/20 0859    Specimen:  Blood Updated:  01/01/20 1000     Extra Tube hold for add-on     Comment: Auto resulted       Green Top (Gel) [[id number]] Collected:  01/01/20 0859    Specimen:  Blood Updated:  01/01/20 1000     Extra Tube Hold for add-ons.     Comment: Auto  resulted.       Lucia Top [[id number]] Collected:  01/01/20 0859    Specimen:  Blood Updated:  01/01/20 1000     Extra Tube hold for add-on     Comment: Auto resulted       Comprehensive Metabolic Panel [[id number]]  (Abnormal) Collected:  01/01/20 0859    Specimen:  Blood Updated:  01/01/20 0930     Glucose 95 mg/dL      BUN 16 mg/dL      Creatinine 1.33 mg/dL      Sodium 138 mmol/L      Potassium 4.0 mmol/L      Chloride 97 mmol/L      CO2 26.0 mmol/L      Calcium 9.4 mg/dL      Total Protein 8.7 g/dL      Albumin 4.10 g/dL      ALT (SGPT) 7 U/L      AST (SGOT) 13 U/L      Alkaline Phosphatase 102 U/L      Total Bilirubin 0.6 mg/dL      eGFR Non African Amer 54 mL/min/1.73      Globulin 4.6 gm/dL      A/G Ratio 0.9 g/dL      BUN/Creatinine Ratio 12.0     Anion Gap 15.0 mmol/L     Narrative:       GFR Normal >60  Chronic Kidney Disease <60  Kidney Failure <15      Protime-INR [[id number]]  (Normal) Collected:  01/01/20 0859    Specimen:  Blood Updated:  01/01/20 0919     Protime 14.9 Seconds      INR 1.19    Narrative:       Therapeutic range for most indications is 2.0-3.0 INR,  or 2.5-3.5 for mechanical heart valves.    CBC & Differential [[id number]] Collected:  01/01/20 0859    Specimen:  Blood Updated:  01/01/20 0908    Narrative:       The following orders were created for panel order CBC & Differential.  Procedure                               Abnormality         Status                     ---------                               -----------         ------                     CBC Auto Differential[[id number]]        Abnormal            Final result                 Please view results for these tests on the individual orders.    CBC Auto Differential [[id number]]  (Abnormal) Collected:  01/01/20 0859    Specimen:  Blood Updated:  01/01/20 0908     WBC 21.16 10*3/mm3      RBC 4.30 10*6/mm3      Hemoglobin 12.7 g/dL      Hematocrit 38.9 %      MCV 90.5 fL      MCH 29.5 pg      MCHC 32.6 g/dL      RDW 12.9 %       RDW-SD 42.6 fl      MPV 10.4 fL      Platelets 366 10*3/mm3      Neutrophil % 84.9 %      Lymphocyte % 6.6 %      Monocyte % 7.6 %      Eosinophil % 0.2 %      Basophil % 0.3 %      Immature Grans % 0.4 %      Neutrophils, Absolute 17.98 10*3/mm3      Lymphocytes, Absolute 1.39 10*3/mm3      Monocytes, Absolute 1.60 10*3/mm3      Eosinophils, Absolute 0.04 10*3/mm3      Basophils, Absolute 0.06 10*3/mm3      Immature Grans, Absolute 0.09 10*3/mm3      nRBC 0.0 /100 WBC     POC Glucose Once [[id number]]  (Normal) Collected:  01/01/20 0851    Specimen:  Blood Updated:  01/01/20 0908     Glucose 92 mg/dL      Comment: : [id number] CYNDEE OSORIOMeter ID: [id number]           Imaging Results (Last 24 Hours)     Procedure Component Value Units Date/Time    CT Angiogram Neck [[id number]] Resulted:  01/01/20 1037     Updated:  01/01/20 1052    CT Angiogram Head [[id number]] Resulted:  01/01/20 1037     Updated:  01/01/20 1052    XR Chest 1 View [[id number]] Collected:  01/01/20 0928     Updated:  01/01/20 0945    Narrative:         PROCEDURE: Single chest view AP    REASON FOR EXAM:Stroke Protocol (Onset > 12 hrs)    FINDINGS: Comparison exam dated October 27, 2015. Cardiac and  pulmonary vasculature are normal. Lungs are clear. Pleural spaces  are normal. No acute osseous abnormality.      Impression:       No acute cardiopulmonary abnormality.    Electronically signed by:  Quinn Triplett MD  1/1/2020 9:44 AM CST  Workstation: [id number]    CT Head Without Contrast Stroke Protocol [[id number]] Collected:  01/01/20 0908     Updated:  01/01/20 0940    Narrative:         PROCEDURE: CT head without contrast    REASON FOR EXAM: Stroke. Left-sided weakness    This exam was performed according to our departmental  dose-optimization program, which includes automated exposure  control, adjustment of the mA and/or kV according to patient size  and/or use of iterative reconstruction technique.    FINDINGS: Comparison study dated  October 27, 2015. Axial computer  tomography sequential imaging of the head was performed from the  vertex to the base of the skull. .Sagittal and coronal  reformation was performed .    The skull vault is intact. Paranasal sinuses and bilateral  mastoid air cells are well aerated. New small right frontal lobe  gray-white matter focus of hypodensity suspicious for acute  infarct. Bilateral frontal lobe periventricular deep white matter  small foci of hypodensity. Bilateral frontal lobe deep white  matter small circular foci of CSF attenuation with the largest  involving the left which measures 4.8 mm in greatest diameter.  Bilateral thalami small oval CSF attenuating lesions which are  sub-5 mm in size. Cerebral and cerebellar parenchymal are  otherwise unremarkable. Ventricular system and subarachnoid  spaces are normal.      Impression:       1.  New small right frontal lobe gray-white matter focus of  hypodensity suspicious for acute infarct.   2.  Bilateral frontal lobe periventricular deep white matter foci  of hypodensities most likely representing chronic ischemic  gliosis secondary to microvascular disease.  3.  Bilateral frontal lobe deep white matter old lacunar  infarcts.  4.  Bilateral thalami old lacunar infarct.  5.  ER clinician notified of findings by phone at 9:30 AM on  1/1/2020.    Electronically signed by:  Quinn Triplett MD  1/1/2020 9:39 AM CST  Workstation: [id number]             Assessment/Plan:  64 yo LHWM with h/o HTN, CAD, PAD, here with L sided weakness, which has improved significantly. Pt has been taking his ASA/Plavix every other day since last month. CTH- shows acute-subacute RMCA stroke, chronic bilateral lacunar strokes.       8. RMCA stroke - CTH already shows RMCA stroke, along with some subacute to chronic changes as well. CTA head/neck shows no significant stenosis or occlusion. Likely embolic source. Will get MRI brain stroke protocol, 2D v STACY based on MRI results. Will start ASA  81mg and plavix 75mg daily. Lipitor 40mg daily. Okay for normal BP goals, as pt showed no symptoms worsening with his BP in 100s.   9. HTN- Normal BP goals. Will not start any BP meds for now  10. CAD/PAD- Will start aspirin and plavix daily. Along with statins.   11. Leukocytosis- with cough- possible URTI. CXR looked okay. Further treatment per hospitalist.   12. Activity- Bedrest for today. PT/OT tomorrow  13. Diet- Bedside swallw eval. Okay to start diet if clears swallow eval.     Case was d/w pt and nursing. Will update hospitalist as well. Thank you for the consult.           Bernardino Ward MD  January 1, 2020  11:20 AM    Verbal consent taken.  Patient agreeable to be seen via telemedicine.    This was an audio and video enabled telemedicine encounter.              Electronically signed by Bernardino Ward MD at 01/01/20 1149       Nuzhat Valera RN  [facility]  [phone]  Fax [phone]

## 2020-01-02 NOTE — CONSULTS
Adult Nutrition  Assessment    Patient Name:  Aubrey Montilla  YOB: 1955  MRN: [id number]  Admit Date:  1/1/2020    Assessment Date:  1/2/2020    Comments:  Pt reports good appetite.  Reports 20 lb wt loss the past 2 mo.  BMI 17 with pt at 75% IBW.  Pt iwht left anterior toe abrasion. Pt willing to receive Boost with meals.  Intake 50% - 1x, 0% - 1x.  Meds and labs reviewed.     Reason for Assessment     Row Name 01/02/20 1612          Reason for Assessment    Reason For Assessment  per organizational policy underweight     Identified At Risk by Screening Criteria  BMI             Labs/Tests/Procedures/Meds     Row Name 01/02/20 1613          Labs/Procedures/Meds    Lab Results Reviewed  reviewed        Medications    Pertinent Medications Reviewed  reviewed         Physical Findings     Row Name 01/02/20 1614          Physical Findings    Overall Physical Appearance  underweight     Skin  other (see comments) left anterior toe abrasion         Estimated/Assessed Needs     Row Name 01/02/20 1614          Calculation Measurements    Weight Used For Calculations  80.9 kg (178 lb 5.6 oz)        Estimated/Assessed Needs    Additional Documentation  Calorie Requirements (Group);Fluid Requirements (Group);Park-St. Jeor Equation (Group);Protein Requirements (Group)        Calorie Requirements    Estimated Calorie Requirement (kcal/day)  2148     Estimated Calorie Need Method  Park-St Jeor        Park-St. Jeor Equation    RMR (Park-St. Jeor Equation)  1652.875        Protein Requirements    Weight Used For Protein Calculations  80.9 kg (178 lb 5.6 oz)     Est Protein Requirement Amount (gms/kg)  1.0 gm protein     Estimated Protein Requirements (gms/day)  80.9        Fluid Requirements    Estimated Fluid Requirements (mL/day)  2427     RDA Method (mL)  2427     Vicente-Segar Method (over 20 kg)  3118         Nutrition Prescription Ordered     Row Name 01/02/20 1617          Nutrition  Prescription PO    Current PO Diet  Regular     Common Modifiers  Cardiac;Consistent Carbohydrate         Evaluation of Received Nutrient/Fluid Intake     Row Name 01/02/20 1617          PO Evaluation    Number of Meals  2     % PO Intake  50% - 1x, 0% -1x               Electronically signed by:  Gloria Roger RD  01/02/20 4:19 PM

## 2020-01-02 NOTE — PROGRESS NOTES
[facility] HOSPITALIST PROGRESS NOTE     Patient Identification:  Name:  Aubrey Montilla  Age:  64 y.o.  Sex:  male  :  1955  MRN:  [id number]  Visit Number:  [id number]  Primary Care Provider:  Lorenzo Mondragon MD    Length of stay:  0    Chief complaint: Persistent mild left-sided weakness    Subjective:  64 y.o. male with known history of hypertension, coronary artery disease previous CVA who presents today emergency room complaining of right-sided facial numbness and weakness of the left upper and lower extremities all of 1 day duration.  Patient said that his legs were so weak that he fell down.  On presentation to the emergency room patient had a CT of the brain which showed he has an acute infarct.  Tele - neurology was consulted.  The distribution of the stroke is suspicious for embolic phenomena.  Echo was done as well as STACY.  CTA did not show any stenosis.  Patient has been started on PT/OT.  Recommendation is acute inpatient rehab.  Patient on review admits to 21 pound weight loss.  Had recent colonoscopy which was negative.  Also further questioning showed that patient was taking aspirin and Plavix on alternate days.  Neurology recommendation is daily aspirin and Plavix.  ----------------------------------------------------------------------------------------------------------------------  Current Hospital Meds:    aspirin 81 mg Oral Daily   Or      aspirin 300 mg Rectal Daily   atorvastatin 40 mg Oral Nightly   clopidogrel 75 mg Oral Daily   sodium chloride 10 mL Intravenous Q12H       sodium chloride 75 mL/hr Last Rate: 75 mL/hr (20 1158)     ----------------------------------------------------------------------------------------------------------------------  Vital Signs:  Temp:  [97.1 °F (36.2 °C)-98.1 °F (36.7 °C)] 97.5 °F (36.4 °C)  Heart Rate:  [] 97  Resp:  [12-18] 14  BP: (105-160)/(56-87) 105/59      20  1126 20  1146 20  0526    Weight: 67.1 kg (148 lb) 61.8 kg (136 lb 3.2 oz) 61.5 kg (135 lb 8 oz)     Body mass index is 17.88 kg/m².    Intake/Output Summary (Last 24 hours) at 1/2/2020 1259  Last data filed at 1/2/2020 0804  Gross per 24 hour   Intake 2163.75 ml   Output 500 ml   Net 1663.75 ml     Diet Regular; Cardiac, Consistent Carbohydrate  ----------------------------------------------------------------------------------------------------------------------  Physical exam:  Constitutional: Middle aged man.  No respiratory distress.      HENT:  Head: Normocephalic and atraumatic.  Mouth:  Moist mucous membranes.    Eyes:  Conjunctivae and EOM are normal.  Pupils are equal, round, and reactive to light.  No scleral icterus.  Neck:  Neck supple.  No JVD present.    Cardiovascular:  Normal rate, regular rhythm and normal heart sounds with no murmur.  Pulmonary/Chest:  No respiratory distress, no wheezes, no crackles, with normal breath sounds and good air movement.  Abdominal:  Soft.  Bowel sounds are normal.  No distension and no tenderness.   Musculoskeletal:  No edema, no tenderness, and no deformity.  No red or swollen joints anywhere.  Muscle wasting especially in the small fingers and thenar eminence  Neurological:  Alert and oriented to person, place, and time.  No cranial nerve deficit.  No tongue deviation.  No facial droop.  No slurred speech.  Mild weakness of the left .  Skin:  Skin is warm and dry.  No rash noted.  No pallor.   Psychiatric:  Normal mood and affect.  Behavior is normal.  Judgment and thought content normal.   Peripheral vascular:  No edema and strong pulses on all 4 extremities.  Genitourinary: Deferred  ----------------------------------------------------------------------------------------------------------------------  Tele:    ----------------------------------------------------------------------------------------------------------------------  Results from last 7 days   Lab Units 01/01/20  0847    TROPONIN T ng/mL <0.010     Results from last 7 days   Lab Units 01/02/20  0527 01/01/20  0859   WBC 10*3/mm3 12.80* 21.16*   HEMOGLOBIN g/dL 10.8* 12.7*   HEMATOCRIT % 32.9* 38.9   MCV fL 88.4 90.5   MCHC g/dL 32.8 32.6   PLATELETS 10*3/mm3 278 366   INR   --  1.19         Results from last 7 days   Lab Units 01/01/20  0859   SODIUM mmol/L 138   POTASSIUM mmol/L 4.0   CHLORIDE mmol/L 97*   CO2 mmol/L 26.0   BUN mg/dL 16   CREATININE mg/dL 1.33*   EGFR IF NONAFRICN AM mL/min/1.73 54*   CALCIUM mg/dL 9.4   GLUCOSE mg/dL 95   ALBUMIN g/dL 4.10   BILIRUBIN mg/dL 0.6   ALK PHOS U/L 102   AST (SGOT) U/L 13   ALT (SGPT) U/L 7   Estimated Creatinine Clearance: 48.8 mL/min (A) (by C-G formula based on SCr of 1.33 mg/dL (H)).    No results found for: AMMONIA  Results from last 7 days   Lab Units 01/02/20  0527   CHOLESTEROL mg/dL 119   TRIGLYCERIDES mg/dL 61   HDL CHOL mg/dL 41   LDL CHOL mg/dL 66     Blood Culture   Date Value Ref Range Status   01/01/2020 No growth at 24 hours  Preliminary     No results found for: URINECX  No results found for: WOUNDCX  No results found for: STOOLCX    I have personally looked at the labs and they are summarized above.  ----------------------------------------------------------------------------------------------------------------------  Imaging Results (Last 24 Hours)     Procedure Component Value Units Date/Time    MRI Brain Without Contrast [[id number]] Collected:  01/01/20 1351     Updated:  01/01/20 1511    Narrative:       EXAM DESCRIPTION: MRI BRAIN WO CONTRAST    CLINICAL HISTORY: 64-year-old male stroke syndrome; left-sided  weakness and fall.    COMPARISON: CT head 1/1/2020.    TECHNIQUE: Multisequence multiplanar imaging of the brain is  performed without contrast.      FINDINGS:   Brain Parenchyma:  The craniovertebral junction and the included  cervical cord are unremarkable. No Chiari malformation. Normal  noncontrast appearance of the pituitary and the stalk. The  midline  structures are intact.    No evidence of intracranial hemorrhage, suspicious extra-axial  fluid collections or gradient susceptibility. No midline shift or  mass effect. Redemonstration of bilateral remote lacunar infarcts  involving the thalami and the cerebral white matter. The region  of the cerebellopontine angles are unremarkable.    Diffusion: There is an approximate 1.3 x 3.6 cm focus of  restricted diffusion corresponding to the described same day CT  hypodensity predominantly extending inferiorly and posteriorly to  the central sulcus including the postcentral gyrus location.  There is no corresponding gradient susceptibility. The finding is  identified on both T2 and FLAIR sequences.    Atrophy: Redemonstration generalized volume loss with associated  change.    Microvascular Disease: There is moderate sequela of chronic  microvascular disease involving the cerebral white matter.    Vessels: There is signal void within major intracranial vessels.    Extra-axial Spaces: Appropriate for age and degree of volume  loss.    Ventricles: No hydrocephalus.    Bones: No suspicious marrow signal alteration identified.    Sinuses and Mastoids: Trace mastoid effusion. The included  sinuses are clear.    Scalp and Soft Tissues: Unremarkable.     Orbits: Unremarkable.        Impression:       Acute/subacute infarct  on the right corresponding to the  described same day CT hypodensity predominantly extending  posteriorly and inferiorly to the level of the central sulcus  measuring approximately 1.3 x 3.6 cm in its AP and transverse  dimensions respectively. No associated gradient susceptibility or  midline shift.    Redemonstrated moderate sequela of chronic microvascular disease  involving the cerebral white matter and remote lacunar infarcts  involving the thalami and cerebral white matter.    Report of findings given to Marisel GALINDO, nurse caring for this  patient on 1/1/2020 at 2:55 PM CST. She will notify Dr. Ward  of  these results.    Electronically signed by:  Ja Bender MD  1/1/2020 3:10 PM [facility]  Workstation: [id number]        ----------------------------------------------------------------------------------------------------------------------  Assessment:  Acute frontal lobe infarct  Leukocytosis probably due to bronchitis  Coronary artery disease  Peripheral vascular disease  Hypertension  Unintentional weight loss with BMI of 17.88  Normochromic anemia    Plan:  We will consult nutritionist.  Patient will require outpatient work-up for unintentional weight loss.  Continue patient on aspirin, Plavix and atorvastatin.  We will continue on IV doxycycline.  We will resume his home medications which include atenolol, Zoloft and neb  Treatments  Continue with PT/OT evaluation and management.    Prophylaxis:  DVT-early ambulation/education.    Disposition:  Awaiting acute rehab placement.  Plan of care was discussed with the patient  Appreciate the input of neurology.      Pawel Pratt MD  01/02/20  12:59 PM     Dragon disclaimer:  Much of this encounter note is an electronic transcription/translation of spoken language to printed text. The electronic translation of spoken language may permit erroneous, or at times, nonsensical words or phrases to be inadvertently transcribed; Although I have reviewed the note for such errors, some may still exist.

## 2020-01-02 NOTE — PROGRESS NOTES
Discharge Planning Assessment  [facility]     Patient Name: Aubrey Montilla  MRN: [id number]  Today's Date: 1/2/2020    Admit Date: 1/1/2020    Discharge Needs Assessment     Row Name 01/02/20 1343       Living Environment    Lives With  spouse    Name(s) of Who Lives With Patient  Billie Montilla (spouse)    Current Living Arrangements  home/apartment/condo    Primary Care Provided by  self    Family Caregiver if Needed  other (see comments) Patient states that his spouse is disabled. He reports that she has hx: depression and PTSD. He states that the two assist one another.     Family Caregiver Names  Billie Montilla (spouse)    Quality of Family Relationships  involved    Able to Return to Prior Arrangements  other (see comments) Inpatient rehab recommended. Patient is not agreeable to inpatient rehab services.     Living Arrangement Comments  Patient states that he lives with his spouse. He reports that his spouse is disabled. He denies that she receives disability services but reports that she had PTSD and depression. He reports that she is unable to work.  He states that spouse only worked a short period of time outside the home and denies that she is eligible for social security disability and secondary to his income, she is not eligible for SSI. Patient reports that his spouse does not drive. Both assist one another with performing IADL's.        Resource/Environmental Concerns    Resource/Environmental Concerns  reliable transportation;financial    Financial Concerns  food, unable to afford;other (see comments) Patient states that he is on a fixed income. He reports that he receives $1611.00 social security disability and $474 per month VA benefits. He reports that he exceeds income requirement to qualify for food stamps. Patient accessess local food [place].     Transportation Concerns  car, none       Transition Planning    Patient/Family Anticipates Transition to  home with family    Patient/Family  Anticipated Services at Transition  home health care;durable medical equipment    Transportation Anticipated  other (see comments) Patient voiced that his spouse does not drive. Limited support from his stepdaughter. Patient states that he is eligible for VA transportation to appointments.        Discharge Needs Assessment    Readmission Within the Last 30 Days  no previous admission in last 30 days    Concerns to be Addressed  adjustment to diagnosis/illness;compliance issue;patient refuses services    Concerns Comments  SWRk reviewed OT notes with patient and discussed inpatient rehab recommendation. Patient is not agreeable. He states that he needs to return home and reports that his wife needs him. Patient reports that his spouse is independent with ADL's. SWRK voiced concern that he physically requires assistance at this time. Patient reports that his spouse can assist him. KELLIRK discussed concern and asked if he would be agreeable to consider inpatient rehab, pending VA approval. GEORGEK explained that CM would have to contact VA to discuss rehab options, but asked patient if approved would he be agreeable. Patient continues to state that he is not agreeable but plans to return home. Patient voiced need for a walker at d/c. He is also agreeable to home health for therapy pending VA approval.     Equipment Currently Used at Home  cane, straight    Anticipated Changes Related to Illness  none    Equipment Needed After Discharge  walker, rolling;other (see comments) DME will need to be approved by the VA. Patient voiced interest in a walker at d/c.     Discharge Facility/Level of Care Needs  home with home health;other (see comments) Pending VA approval.     Current Discharge Risk  chronically ill;dependent with mobility/activities of daily living        Discharge Plan     Row Name 01/02/20 3344       Plan    Plan  Disposition unknown     Plan Comments  Assessment complete. Patient is not agreeable to inpatient  "rehab. MARIYA informed patient rehab was recommended and requested that he consider services if he does not perform well during PT evaluation. Patient initially denied having Medicare. He then stated that he has a card in his wallet and states it is a red and white \"free card\". He states that he does not use and uses his VA. He voiced interest in a walker at d/c and is also agreeable to consider HH. MARIYA encouraged patient to consider inpatient options pending VA approves services. He verbalized understanding but continues to decline inpatient rehab at this time. MARIYA left a note for the provider. All HH, DME and, d/c medications needs prior approval from the VA..........Irma VIDES        Destination      Coordination has not been started for this encounter.      Durable Medical Equipment      Coordination has not been started for this encounter.      Dialysis/Infusion      Coordination has not been started for this encounter.      Home Medical Care      Coordination has not been started for this encounter.      Therapy      Coordination has not been started for this encounter.      Community Resources      Coordination has not been started for this encounter.        Expected Discharge Date and Time     Expected Discharge Date Expected Discharge Time    Jan 4, 2020         Demographic Summary     Row Name 01/02/20 1320       General Information    Admission Type  observation    Arrived From  emergency department    Reason for Consult  discharge planning    Preferred Language  English     Used During This Interaction  no       Contact Information    Contact Information Obtained for          Functional Status     Row Name 01/02/20 1338       Functional Status    Usual Activity Tolerance  good    Current Activity Tolerance  poor    Functional Status Comments  Patient voiced independence with ADL's prior. Patient has a cane available and uses it, as needed.        Functional Status, IADL    " Medications  independent Pharmacy, [facility] [place], and prescription coverage through the VA.  Patient received medications mail order.     Meal Preparation  assistive person    Housekeeping  assistive person    Laundry  assistive person    Shopping  assistive person       Mental Status Summary    Recent Changes in Mental Status/Cognitive Functioning  no changes       Employment/    Employment Status  disabled        Psychosocial    No documentation.       Abuse/Neglect    No documentation.       Legal    No documentation.       Substance Abuse    No documentation.       Patient Forms    No documentation.           PEEWEE Gray

## 2020-01-02 NOTE — THERAPY DISCHARGE NOTE
Acute Care - IRF Speech Language Pathology /Discharge  [facility]     Patient Name: Aubrey Montilla  : 1955  MRN: [id number]  Today's Date: 2020         Admit Date: 2020    Visit Dx:     ICD-10-CM ICD-9-CM   1. Cerebrovascular accident (CVA), unspecified mechanism (CMS/HCC) I63.9 434.91   2. Symbolic dysfunction R48.9 784.60     Patient Active Problem List   Diagnosis   • Cerebrovascular accident (CVA) (CMS/[facility])              EDUCATION  The patient has been educated in the following areas:   Communication Impairment Dysphagia (Swallowing Impairment).    SLP Recommendation and Plan           Anticipated Dischage Disposition: unknown  SLC Criteria for Skilled Therapy Interventions Met: not appropriate             Plan of Care Reviewed With: patient  Plan of Care Reviewed With: patient            Time Calculation:   Time Calculation- SLP     Row Name 20 1141             Time Calculation- SLP    SLP Start Time  1130  -EC      SLP Stop Time  1145  -EC      SLP Time Calculation (min)  15 min  -EC      Total Timed Code Minutes- SLP  15 minute(s)  -EC      SLP Received On  20  -EC        User Key  (r) = Recorded By, (t) = Taken By, (c) = Cosigned By    Initials Name Provider Type    EC Mary Mcclelland CCC-SLP Speech and Language Pathologist          Therapy Charges for Today     Code Description Service Date Service Provider Modifiers Qty    [id number] HC ST EVAL SPEECH AND PROD W LANG  1 2020 Mary Mcclelland CCC-SLP GN 1               SLP Discharge Summary  Anticipated Dischage Disposition: unknown    ODETTE Adam  2020

## 2020-01-02 NOTE — CONSULTS
[facility] Consult    Referring Provider: Lior England MD    Reason for Consultation: STACY consult    Patient Care Team:  Lorenzo Mondragon MD as PCP - General    Chief complaint   Chief Complaint   Patient presents with   • Stroke       Subjective .     History of present illness:     Cardiology consultation for STACY secondary to CVA. He has had CVA in the past with left sided weakness. This CVA resulted in left sided weakness. He does not appear to have paralytic deficits, only fine motor movements, including eating. [facility] He takes Plavix for CAD with MI in 2006. He does not have teeth or wear dentures. No swallowing deficit with supper last night.       The CVD Risk score (D'Agostino, et al., 2008) failed to calculate for the following reasons:    The patient has a prior MI, stroke, CHF, or peripheral vascular disease diagnosis      Review of Systems  Review of Systems   Constitutional: Negative for diaphoresis, fatigue, fever and unexpected weight change.   Respiratory: Negative for cough, chest tightness, shortness of breath and wheezing.    Cardiovascular: Negative for chest pain, palpitations and leg swelling.   Gastrointestinal: Negative for abdominal distention and blood in stool.   Genitourinary: Negative for hematuria.   Musculoskeletal: Negative for arthralgias and myalgias.   Skin: Negative for pallor and rash.   Neurological: Negative for dizziness, syncope and weakness.   Hematological: Does not bruise/bleed easily.   Psychiatric/Behavioral: Negative for confusion. The patient is not nervous/anxious.        History  Past Medical History:   Diagnosis Date   • Elevated cholesterol    • Hypertension    ,   Past Surgical History:   Procedure Laterality Date   • ENDOSCOPY N/A 8/29/2017    Procedure: ESOPHAGOGASTRODUODENOSCOPY;  Surgeon: Van Khoury DO;  Location: [facility] ENDOSCOPY;  Service:    • ENDOSCOPY N/A 6/11/2019    Procedure: ESOPHAGOGASTRODUODENOSCOPY;   Surgeon: Van Khoury DO;  Location: [facility] ENDOSCOPY;  Service: Gastroenterology   , History reviewed. No pertinent family history.,   Social History     Tobacco Use   • Smoking status: Former Smoker   • Smokeless tobacco: Never Used   Substance Use Topics   • Alcohol use: Not on file   • Drug use: Not on file   ,   Medications Prior to Admission   Medication Sig Dispense Refill Last Dose   • albuterol (ACCUNEB) 1.25 MG/3ML nebulizer solution Take 1 ampule by nebulization Every 6 (Six) Hours As Needed for Wheezing.   6/10/2019 at Unknown time   • albuterol (PROVENTIL HFA;VENTOLIN HFA) 108 (90 Base) MCG/ACT inhaler Inhale 2 puffs Every 4 (Four) Hours As Needed for Wheezing.   6/10/2019 at Unknown time   • aspirin 81 MG chewable tablet Chew 81 mg Daily.   1/1/2020 at Unknown time   • atenolol (TENORMIN) 50 MG tablet Take 50 mg by mouth Daily.   1/1/2020 at Unknown time   • atorvastatin (LIPITOR) 80 MG tablet Take 80 mg by mouth Daily.   1/1/2020 at Unknown time   • budesonide-formoterol (SYMBICORT) 160-4.5 MCG/ACT inhaler Inhale 2 puffs 2 (Two) Times a Day.   6/10/2019 at Unknown time   • clopidogrel (PLAVIX) 75 MG tablet Take 75 mg by mouth Daily.   1/1/2020 at Unknown time   • cyclobenzaprine (FLEXERIL) 10 MG tablet Take 10 mg by mouth 3 (Three) Times a Day As Needed for Muscle Spasms.   6/10/2019 at Unknown time   • docusate sodium (COLACE) 100 MG capsule Take 100 mg by mouth 2 (Two) Times a Day.   1/1/2020 at Unknown time   • gabapentin (NEURONTIN) 400 MG capsule Take 400 mg by mouth 3 (Three) Times a Day.   1/1/2020 at Unknown time   • hydrOXYzine (ATARAX) 25 MG tablet Take 25 mg by mouth 3 (Three) Times a Day As Needed for Itching.   6/10/2019 at Unknown time   • sertraline (ZOLOFT) 100 MG tablet Take 100 mg by mouth Daily.   1/1/2020 at Unknown time      ALLERGIES  Contrast dye    Objective     Vital Sign Min/Max for last 24 hours  Temp  Min: 97.1 °F (36.2 °C)  Max: 98.1 °F (36.7 °C)   BP  Min: 105/59   "Max: 158/65   Pulse  Min: 86  Max: 96   Resp  Min: 16  Max: 18   SpO2  Min: 94 %  Max: 100 %   No data recorded   Weight  Min: 61.5 kg (135 lb 8 oz)  Max: 67.1 kg (148 lb)     Flowsheet Rows      First Filed Value   Admission Height  185.4 cm (73\") Documented at 01/01/2020 1126   Admission Weight  67.1 kg (148 lb) Documented at 01/01/2020 1126             Physical Exam:  Physical Exam   Constitutional: He is oriented to person, place, and time. He appears well-developed and well-nourished. No distress.   HENT:   Head: Normocephalic.   Eyes: Conjunctivae are normal.   Neck: No JVD present.   Cardiovascular: Normal rate, regular rhythm, S1 normal, S2 normal, normal heart sounds and intact distal pulses. Exam reveals no gallop and no friction rub.   No murmur heard.  Pulmonary/Chest: Effort normal and breath sounds normal. No respiratory distress. He has no wheezes. He has no rales.   Abdominal: Soft. Bowel sounds are normal. He exhibits no distension.   Musculoskeletal: Normal range of motion. He exhibits no edema.   Neurological: He is alert and oriented to person, place, and time.   Skin: Skin is warm and dry. He is not diaphoretic. No erythema.   Psychiatric: He has a normal mood and affect. His behavior is normal. Judgment and thought content normal.   Nursing note and vitals reviewed.         Results Review:     Results from last 7 days   Lab Units 01/01/20  0859   SODIUM mmol/L 138   POTASSIUM mmol/L 4.0   CHLORIDE mmol/L 97*   CO2 mmol/L 26.0   BUN mg/dL 16   CREATININE mg/dL 1.33*   CALCIUM mg/dL 9.4   BILIRUBIN mg/dL 0.6   ALK PHOS U/L 102   ALT (SGPT) U/L 7   AST (SGOT) U/L 13   GLUCOSE mg/dL 95       Estimated Creatinine Clearance: 48.8 mL/min (A) (by C-G formula based on SCr of 1.33 mg/dL (H)).          Results from last 7 days   Lab Units 01/02/20  0527 01/01/20  0859   WBC 10*3/mm3 12.80* 21.16*   HEMOGLOBIN g/dL 10.8* 12.7*   PLATELETS 10*3/mm3 278 366       Results from last 7 days   Lab Units " 01/01/20  0859   INR  1.19       Lab Results   Component Value Date    TROPONINT <0.010 01/01/2020     No results found for: PROBNP    I/O last 3 completed shifts:  In: 2163.8 [P.O.:1080; I.V.:1083.8]  Out: 400 [Urine:400]    Cardiographics    Results for orders placed during the hospital encounter of 01/01/20   Adult Transthoracic Echo Complete W/ Cont if Necessary Per Protocol    Narrative · The study is technically difficult for diagnosis.  · Left ventricular systolic function is mildly decreased. LVEF: 48%. Mild   global hypokinesis. Grade one diastolic dysfunction.  · Right ventricle systolic function is normal.  · No evidence of a patent foramen ovale. No evidence of an atrial septal   defect present. Saline test results are negative.  · No intracardiac thrombus or mass.          Ct Angiogram Head    Result Date: 1/1/2020  1.  CTA findings as described above. Electronically signed by:  Quinn Triplett MD  1/1/2020 11:47 AM CST Workstation: [id number]    Ct Angiogram Neck    Result Date: 1/1/2020  1.  CTA findings as described above. Electronically signed by:  Quinn Triplett MD  1/1/2020 11:47 AM CST Workstation: [id number]    Mri Brain Without Contrast    Result Date: 1/1/2020  Acute/subacute infarct  on the right corresponding to the described same day CT hypodensity predominantly extending posteriorly and inferiorly to the level of the central sulcus measuring approximately 1.3 x 3.6 cm in its AP and transverse dimensions respectively. No associated gradient susceptibility or midline shift. Redemonstrated moderate sequela of chronic microvascular disease involving the cerebral white matter and remote lacunar infarcts involving the thalami and cerebral white matter. Report of findings given to Marisel GALINDO, nurse caring for this patient on 1/1/2020 at 2:55 PM CST. She will notify Dr. Ward of these results. Electronically signed by:  Ja Bender MD  1/1/2020 3:10 PM CST Workstation: [id number]    Xr Chest 1 View    Result Date:  1/1/2020  No acute cardiopulmonary abnormality. Electronically signed by:  Quinn Triplett MD  1/1/2020 9:44 AM CST Workstation: [id number]    Ct Head Without Contrast Stroke Protocol    Result Date: 1/1/2020  1.  New small right frontal lobe gray-white matter focus of hypodensity suspicious for acute infarct. 2.  Bilateral frontal lobe periventricular deep white matter foci of hypodensities most likely representing chronic ischemic gliosis secondary to microvascular disease. 3.  Bilateral frontal lobe deep white matter old lacunar infarcts. 4.  Bilateral thalami old lacunar infarct. 5.  ER clinician notified of findings by phone at 9:30 AM on 1/1/2020. Electronically signed by:  Quinn Triplett MD  1/1/2020 9:39 AM CST Workstation: [id number]      Intake/Output       01/01/20 0700 - 01/02/20 0659 01/02/20 0700 - 01/03/20 0659    Intake (ml) 2163.8 --    Output (ml) 400 100    Net (ml) 1763.8 -100    Last Weight  61.5 kg (135 lb 8 oz)  --            Assessment/Plan       Cerebrovascular accident (CVA) (CMS/[facility])    STACY Pre-Op:     Screening for STACY relative and absolute contraindications was performed.  The patient denies a history of dysphagia, odontophagia, DDS final radiation, recent upper gastrointestinal surgery, recent esophagitis, thoracic aortic aneurysm.  The patient denies esophageal pathology including strictures, tumor, diverticulum, known varices or esophagitis.  The patient's dental status: Edentulous. The patient is currently utilizing salicylates, anticoagulants or antiplatelets. The patient is currently using Clopidogrel (Plavix).     A STACY was recommended to the patient with MAC local IV sedation. The indications and risks/benefits were discussed. This included risks of inadvertent tracheal intubation, hematoma, oropharyngeal bleeding, esophageal perforation.  The patient agreed to proceeding.  The risks of conscious sedation were also reviewed including allergy, anaphylaxis and hypoventilation requiring mechanical  ventilation.  A hand out was also provided to the patient explaining the procedure.      STACY now with Dr. Mendez. Not likely cardioembolic in nature, but will rule out with STACY.  Need outpatient event monitor/14 day monitor to evaluate for arrhythmias.       I discussed the patients findings and my recommendations with patient and Dr. Mendez              This document has been electronically signed by RENALDO Guy on January 2, 2020 8:53 AM

## 2020-01-02 NOTE — THERAPY EVALUATION
Acute Care - Occupational Therapy Initial Evaluation  [facility]     Patient Name: Aubrey Montilla  : 1955  MRN: [id number]  Today's Date: 2020  Onset of Illness/Injury or Date of Surgery: 20  Date of Referral to OT: 20  Referring Physician: Dr. Pratt    Admit Date: 2020       ICD-10-CM ICD-9-CM   1. Cerebrovascular accident (CVA), unspecified mechanism (CMS/HCC) I63.9 434.91   2. Symbolic dysfunction R48.9 784.60   3. Impaired mobility and ADLs Z74.09 799.89     Patient Active Problem List   Diagnosis   • Cerebrovascular accident (CVA) (CMS/HCC)     Past Medical History:   Diagnosis Date   • Elevated cholesterol    • Hypertension      Past Surgical History:   Procedure Laterality Date   • ENDOSCOPY N/A 2017    Procedure: ESOPHAGOGASTRODUODENOSCOPY;  Surgeon: Van Khoury DO;  Location: [facility] ENDOSCOPY;  Service:    • ENDOSCOPY N/A 2019    Procedure: ESOPHAGOGASTRODUODENOSCOPY;  Surgeon: Van Khoury DO;  Location: [facility] ENDOSCOPY;  Service: Gastroenterology          OT ASSESSMENT FLOWSHEET (last 12 hours)      Occupational Therapy Evaluation     Row Name 20 1033                   OT Evaluation Time/Intention    Subjective Information  complains of;pain  -        Document Type  evaluation  -        Mode of Treatment  co-treatment;occupational therapy;physical therapy  -        Total Evaluation Minutes, Occupational Therapy  35  -        Patient Effort  good  -        Symptoms Noted During/After Treatment  fatigue  -           General Information    Patient Profile Reviewed?  yes  -        Onset of Illness/Injury or Date of Surgery  20  -        Referring Physician  Dr. Pratt  -        Patient Observations  alert;cooperative;agree to therapy  -        Patient/Family Observations  no family present   -        General Observations of Patient  pt supine HOB up on room air, tele IV   -        Prior Level of Function   independent:;all household mobility;community mobility;transfer;bed mobility;ADL's;home management;cooking;cleaning;driving;shopping;using stairs  -        Equipment Currently Used at Home  none has SC   -        Existing Precautions/Restrictions  fall  -        Limitations/Impairments  safety/cognitive  -        Risks Reviewed  patient:;LOB;dizziness;increased discomfort;change in vital signs  -        Benefits Reviewed  patient:;improve function;increase independence;increase strength;increase balance;increase knowledge  -        Barriers to Rehab  medically complex  -           Relationship/Environment    Lives With  spouse  -           Resource/Environmental Concerns    Current Living Arrangements  home/apartment/condo  -           Home Main Entrance    Number of Stairs, Main Entrance  two  -        Stair Railings, Main Entrance  railing on left side (ascending)  -           Cognitive Assessment/Interventions    Additional Documentation  Cognitive Assessment/Intervention (Group)  -           Cognitive Assessment/Intervention- PT/OT    Affect/Mental Status (Cognitive)  [facility]        Orientation Status (Cognition)  oriented x 4  -        Follows Commands (Cognition)  follows one step commands;75-90% accuracy;verbal cues/prompting required;repetition of directions required;delayed response/completion;increased processing time needed;initiation impaired;physical/tactile prompts required  [facility]        Safety Deficit (Cognitive)  moderate deficit  -        Personal Safety Interventions  fall prevention program maintained;gait belt;nonskid shoes/slippers when out of bed;supervised activity  [facility]           Safety Issues, Functional Mobility    Safety Issues Affecting Function (Mobility)  awareness of need for assistance;impulsivity;insight into deficits/self awareness;judgment;problem solving;safety precaution awareness;safety precautions follow-through/compliance;sequencing  abilities;positioning of assistive device  -        Impairments Affecting Function (Mobility)  balance;cognition;coordination;endurance/activity tolerance;grasp;motor control;motor planning;muscle tone abnormal;postural/trunk control;range of motion (ROM);sensation/sensory awareness;strength  -           Bed Mobility Assessment/Treatment    Bed Mobility Assessment/Treatment  supine-sit;sit-supine  -        Supine-Sit [place] (Bed Mobility)  minimum assist (75% patient effort);nonverbal cues (demo/gesture);verbal cues decreased balance  -        Sit-Supine [place] (Bed Mobility)  contact guard;nonverbal cues (demo/gesture);verbal cues  -        Bed Mobility, Safety Issues  decreased use of arms for pushing/pulling;decreased use of legs for bridging/pushing;impaired trunk control for bed mobility  -        Assistive Device (Bed Mobility)  bed rails;head of bed elevated  -        Comment (Bed Mobility)  pt decreased balance and leaning backwards at first   -           Functional Mobility    Functional Mobility- Ind. Level  minimum assist (75% patient effort);nonverbal cues required (demo/gesture);verbal cues required  -        Functional Mobility- Device  rolling walker  -        Functional Mobility- Comment  pt poor ability to grasp RW with left hand and sterring/moving RW smoothingly, pt has inconsistent tone and motor control with LUE. pt LUE drifts at times with trying to activate it for a movement for a task.   -           Transfer Assessment/Treatment    Transfer Assessment/Treatment  sit-stand transfer;stand-sit transfer;toilet transfer  -        Comment (Transfers)  cues for hand placment assist with hand placement and positioning of the left hand, pt struggles with grasp and release functionally   -           Sit-Stand Transfer    Sit-Stand [place] (Transfers)  minimum assist (75% patient effort);nonverbal cues (demo/gesture);verbal cues  -        Assistive Device  (Sit-Stand Transfers)  walker, front-wheeled  [facility]           Stand-Sit Transfer    Stand-Sit [place] (Transfers)  minimum assist (75% patient effort);nonverbal cues (demo/gesture);verbal cues  -        Assistive Device (Stand-Sit Transfers)  walker, front-wheeled  [facility]           Toilet Transfer    Type (Toilet Transfer)  stand-sit;sit-stand  -        [place] Level (Toilet Transfer)  minimum assist (75% patient effort);nonverbal cues (demo/gesture);verbal cues  -        Assistive Device (Toilet Transfer)  grab bars/safety frame;walker, front-wheeled  [facility]           ADL Assessment/Intervention    BADL Assessment/Intervention  lower body dressing  -           Lower Body Dressing Assessment/Training    Lower Body Dressing [place] Level  don;socks;minimum assist (75% patient effort)  -        Comment (Lower Body Dressing)  decreased sitting balance poor fine motor control/grasp on left side   -           BADL Safety/Performance    Impairments, [facility] Safety/Performance  balance;cognition;endurance/activity tolerance;grasp/prehension;coordination;motor control;motor planning;range of motion;sensory awareness;shortness of breath;strength;trunk/postural control;visual/perceptual  -           General ROM    GENERAL ROM COMMENTS  RUE WFL fair+ digit to thumb; LUE decreased ROM inconsistent wavering arm at times decreased supination/pronation poor digit to thumb accuracy; fair digit to nose; apperaed to have some left inattention/neglect at times but inconsonsistnet   -           MMT (Manual Muscle Testing)    General MMT Comments  RUE  5/5 RUE grossly 4+/5; LUE  grossly 4/5 delayed response extra cues; LUE grossly 4/5 with extended time ; when pushing both arms at the same time left weaker pushing then right; pt is left handed   -           Motor Assessment/Interventions    Additional Documentation  Fine Motor Testing & Training (Group)  -           Fine Motor Testing & Training    Fine  "Motor Tests  9 Hole Peg Test of Fine Motor Coordination Results  -        Comment, Fine Motor Coordination  defer tasks secondary to pt's max difficulty with fine motor control with functional tasks with larger items and grasp and release   -           Sensory Assessment/Intervention    Additional Documentation  Hearing Assessment (Group);Vision Assessment/Intervention (Group)  -           Light Touch Sensation Assessment    Left Upper Extremity: Light Touch Sensation Assessment  absent sensation deep touch only felt proximal   -        Right Upper Extremity: Light Touch Sensation Assessment  intact  -           Proprioception Assessment    Left Upper Extremity: Proprioception Assessment  intact;mild impairment, 75% or more correct responses inconsistent   -        Right Upper Extremity: Proprioception Assessment  intact  -           Hearing Assessment    Hearing Status  hearing impairment, bilaterally has hearing aides not in   -           Vision Assessment/Intervention    Visual Impairment/Limitations  corrective lenses full time  -           Positioning and Restraints    Pre-Treatment Position  in bed  -        Post Treatment Position  bed  -        In Bed  notified nsg;supine;call light within reach;encouraged to call for assist;exit alarm on;side rails up x2  -           Pain Assessment    Additional Documentation  Pain Scale: Numbers Pre/Post-Treatment (Group)  -           Pain Scale: Numbers Pre/Post-Treatment    Pain Scale: Numbers, Pretreatment  0/10 - no pain \"no pain just cant move it'   -        Pain Scale: Numbers, Post-Treatment  -- \"where this needle is\"   -           Wound 01/01/20 1200 Left anterior toe Abrasion    Wound - Properties Group Date first assessed: 01/01/20  -AK Time first assessed: 1200  -AK Present on Hospital Admission: Y  -AK Side: Left  -AK Orientation: anterior  -AK Location: toe  -AK, 2nd toe   Primary Wound Type: Abrasion  -AK, toe nail removed " traumatically         Plan of Care Review    Plan of Care Reviewed With  patient  -           Clinical Impression (OT)    Date of Referral to OT  01/01/20  -        OT Diagnosis  Impaired mobilty and ADL   -        Prognosis (OT Eval)  fair  -        Functional Level at Time of Evaluation (OT Eval)  pt decreased strength, endurance, safety and independence with ADL, decreased fine motor control, sensation and motor control including grasp and release with left hand and possible some left neglect/inatttention.   -        Criteria for Skilled Therapeutic Interventions Met (OT Eval)  yes;treatment indicated  -        Rehab Potential (OT Eval)  fair, will monitor progress closely  -        Therapy Frequency (OT Eval)  daily  -        Predicted Duration of Therapy Intervention (Therapy Eval)  until d/c   -        Care Plan Review (OT)  evaluation/treatment results reviewed;care plan/treatment goals reviewed;risks/benefits reviewed;current/potential barriers reviewed;patient/other agree to care plan  -        Anticipated Discharge Disposition (OT)  anticipate therapy at next level of care;inpatient rehabilitation facility  -           Vital Signs    Pre Systolic BP Rehab  115  -BH        Pre Treatment Diastolic BP  57  -BH        Post Systolic BP Rehab  142  -BH        Post Treatment Diastolic BP  65  -BH        Pretreatment Heart Rate (beats/min)  92  -BH        Posttreatment Heart Rate (beats/min)  103  -BH        Pre SpO2 (%)  95  -BH        O2 Delivery Pre Treatment  room air  -BH        Post SpO2 (%)  97  -BH        O2 Delivery Post Treatment  room air  -BH        Pre Patient Position  Supine  -BH        Post Patient Position  Supine  -BH           Planned OT Interventions    Planned Therapy Interventions (OT Eval)  activity tolerance training;adaptive equipment training;BADL retraining;cognitive/visual perception retraining;functional balance retraining;IADL retraining;neuromuscular  control/coordination retraining;occupation/activity based interventions;passive ROM/stretching;patient/caregiver education/training;ROM/therapeutic exercise;strengthening exercise;transfer/mobility retraining  -           OT Goals    Transfer Goal Selection (OT)  transfer, OT goal 1  -        Bathing Goal Selection (OT)  bathing, OT goal 1  -        Dressing Goal Selection (OT)  dressing, OT goal 1  -        Toileting Goal Selection (OT)  toileting, OT goal 1  -        Grooming Goal Selection (OT)  grooming, OT goal 1  -        Self-Feeding Goal Selection (OT)  self feeding, OT goal 1  -        Functional Mobility Goal Selection (OT)  functional mobility, OT goal 1  -        Coordination Goal Selection (OT)  coordination, OT goal 1;coordination, OT goal 2  -        Additional Documentation  Self-Feeding Goal Selection (OT) (Row);Grooming Goal Selection (OT) (Row);Coordination Goal Selection (OT) (Row);Functional Mobility Selection (OT) (Row)  -           Transfer Goal 1 (OT)    Activity/Assistive Device (Transfer Goal 1, OT)  toilet  -        [place] Level/Cues Needed (Transfer Goal 1, OT)  standby assist  -        Time Frame (Transfer Goal 1, OT)  long term goal (LTG);by discharge  -        Progress/Outcome (Transfer Goal 1, OT)  goal not met  -           Bathing Goal 1 (OT)    Activity/Assistive Device (Bathing Goal 1, OT)  bathing skills, all  -        [place] Level/Cues Needed (Bathing Goal 1, OT)  set-up required;verbal cues required;standby assist  -BH        Time Frame (Bathing Goal 1, OT)  long term goal (LTG);by discharge  -        Progress/Outcomes (Bathing Goal 1, OT)  goal not met  -           Dressing Goal 1 (OT)    Activity/Assistive Device (Dressing Goal 1, OT)  dressing skills, all  -        [place]/Cues Needed (Dressing Goal 1, OT)  set-up required;verbal cues required;standby assist  -BH        Time Frame (Dressing Goal 1, OT)  long term goal  (LTG);by discharge  -        Progress/Outcome (Dressing Goal 1, OT)  goal not met  -           Toileting Goal 1 (OT)    Activity/Device (Toileting Goal 1, OT)  toileting skills, all  -        [place] Level/Cues Needed (Toileting Goal 1, OT)  standby assist  -        Time Frame (Toileting Goal 1, OT)  long term goal (LTG);by discharge  -        Progress/Outcome (Toileting Goal 1, OT)  goal not met  -           Grooming Goal 1 (OT)    Activity/Device (Grooming Goal 1, OT)  grooming skills, all  -        [place] (Grooming Goal 1, OT)  set-up required;verbal cues required;standby assist  -        Time Frame (Grooming Goal 1, OT)  long term goal (LTG);by discharge  -        Progress/Outcome (Grooming Goal 1, OT)  goal not met  -           Self-Feeding Goal 1 (OT)    Activity/Assistive Device (Self-Feeding Goal 1, OT)  self-feeding skills, all  -        [place] Level/Cues Needed (Self-Feeding Goal 1, OT)  set-up required;verbal cues required;standby assist  -        Time Frame (Self-Feeding Goal 1, OT)  long term goal (LTG);by discharge  -        Progress/Outcomes (Self-Feeding Goal 1, OT)  goal not met  -           Functional Mobility Goal 1 (OT)    Activity/Assistive Device (Functional Mobility Goal 1, OT)  -- AD as appropriate   -        [place] Level/Cues Needed (Functional Mobility Goal 1, OT)  standby assist  -        Distance Goal 1 (Functional Mobility, OT)  for ADL tasks no LOB and good safety  -        Time Frame (Functional Mobility Goal 1, OT)  long term goal (LTG);by discharge  -        Progress/Outcome (Functional Mobility Goal 1, OT)  goal not met  -           Coordination Goal 1 (OT)    Activity/Assistive Device (Coordination Goal 1, OT)  FM task BUE fair coordination   -        [place] Level/Cues Needed (Coordination Goal 1, OT)  set-up required;standby assist  -        Time Frame (Coordination Goal 1, OT)  long term goal (LTG);by  discharge  -        Progress/Outcomes (Coordination Goal 1, OT)  goal not met  -           Coordination Goal 2 (OT)    Activity/Assistive Device (Coordination Goal 2, OT)  GM task BUE fair coordination   -        [place] Level/Cues Needed (Coordination Goal 2, OT)  standby assist;set-up required  -        Time Frame (Coordination Goal 2, OT)  long term goal (LTG);by discharge  -        Progress/Outcomes (Coordination Goal 2, OT)  goal not met  [facility]           Living Environment    Home Accessibility  stairs to enter home walk in shower   -          User Key  (r) = Recorded By, (t) = Taken By, (c) = Cosigned By    Initials Name Effective Dates     Bruna Gentile, OTR/L 06/08/18 -     Marisel Jimenez RN 10/17/16 -                OT Recommendation and Plan  Outcome Summary/Treatment Plan (OT)  Anticipated Discharge Disposition (OT): anticipate therapy at next level of care, inpatient rehabilitation facility  Planned Therapy Interventions (OT Eval): activity tolerance training, adaptive equipment training, BADL retraining, cognitive/visual perception retraining, functional balance retraining, IADL retraining, neuromuscular control/coordination retraining, occupation/activity based interventions, passive ROM/stretching, patient/caregiver education/training, ROM/therapeutic exercise, strengthening exercise, transfer/mobility retraining  Therapy Frequency (OT Eval): daily  Plan of Care Review  Plan of Care Reviewed With: patient  Plan of Care Reviewed With: patient  Outcome Summary: OT eval completed this date, co-eval with PT. Pt was pleasant and engaged well. Pt displayed max difficulty with LUE control, manipulation, sensation, grasp and release. Pt was min assist for sup to sit and min assist for sit to stand off toilet and bed and for mobility with RW but pt unable to accurately grasp and use RW with LUE. Pt unsteady and off balance. Pt min assist and extended time to don socks sitting up in the  bed. Pt could benefit from further skilled OT to reach PLOF/max independence with ADL including function in LUE. Recommend to d/c to inpatient rehab.    Outcome Measures     Row Name 01/02/20 1033             How much help from another is currently needed...    Putting on and taking off regular lower body clothing?  2  -BH      Bathing (including washing, rinsing, and drying)  2  -BH      Toileting (which includes using toilet bed pan or urinal)  2  -BH      Putting on and taking off regular upper body clothing  2  -      Taking care of personal grooming (such as brushing teeth)  2  -BH      Eating meals  2  -      AM-PAC 6 Clicks Score (OT)  12  -         Modified Gresham Scale    Modified [place] Scale  4 - Moderately severe disability.  Unable to walk without assistance, and unable to attend to own bodily needs without assistance.  -         Functional Assessment    Outcome Measure Options  AM-PAC 6 Clicks Daily Activity (OT);Modified Kumar;Modified Barthel Index  -        User Key  (r) = Recorded By, (t) = Taken By, (c) = Cosigned By    Initials Name Provider Type     Bruna Gentile, OTR/L Occupational Therapist          Time Calculation:   Time Calculation- OT     Row Name 01/02/20 1309             Time Calculation- OT    OT Start Time  1033  -      OT Stop Time  1108  -      OT Time Calculation (min)  35 min  -      OT Received On  01/02/20  -      OT Goal Re-Cert Due Date  01/15/20  -        User Key  (r) = Recorded By, (t) = Taken By, (c) = Cosigned By    Initials Name Provider Type     Bruna Gentile, OTR/L Occupational Therapist        Therapy Charges for Today     Code Description Service Date Service Provider Modifiers Qty    [id number]  OT EVAL MOD COMPLEXITY 2 1/2/2020 Bruna Gentile, OTR/L GO 1             The Barthel Index    Feeding          __5___            0= Unable  5= Needs help cutting, spreading butter, etc. Or requires modified diet  10= Independent    Bathing          __0___  0= Dependent  5= Independent (or in shower)    Grooming          _0____  0= Needs to help with personal care  5= Independent face/hair/teehth/shaving (implements provided)    Dressing          ____5_  0= Dependent  5= Needs help but can do about half unaided  10= Independent (including buttons, zippers, laces, etc.)    Bowels          __10___  0= Incontinent  5= Occasional accident  10= Continent    Bladder          __10___  0= Incontinent, or catheterized and unable to manage alone  5= Occasional accident  10= Continent    Toilet Use          _____5_  0= Unable  5= Needs some help, but can do something alone  10= Independent    Transfers           ___10___  0= Unable, no sitting balance  5= Major help (one or two people, physical), can sit  10= Minor help  15= Independent    Mobility          ___10__  0= Immobile or < 50 yards  5= Wheelchair independent, including corners, >50 yards  10= Walks with help of one person (verbal or physical) > 50 yards  15= Independent (but may use any aid, for example, stick ) > 50 yards    Stairs          _5____  0= Unable   5= Needs helps  10= Independent     Total Score: 60/100              Bruna Gentile OTR/L  1/2/2020

## 2020-01-02 NOTE — PROGRESS NOTES
Stroke Progress Note       Chief Complaint:  L sided weakness    Subjective     Subjective:  Feeling better, but still has some L hand weakness. Per pt, he had lost about 20 lbs in last 2 months, as he wasn't getting much food from [employer] [place]. He has chronic cough from his smoking, but denies any blood in sputum. He has had blood in stool, but was diagnosed with hemorrhoids. Per nursing, he had GI work up which was negative.     Review of Systems   Constitutional: Positive for appetite change.        Wt loss of 21 lbs in 2 mths from not eating well   HENT: Negative.    Eyes: Negative.    Respiratory: Positive for cough.         Chronic cough   Cardiovascular: Negative.    Gastrointestinal: Negative.    Endocrine: Negative.    Genitourinary: Negative.    Musculoskeletal: Negative.    Allergic/Immunologic: Negative.         Objective      Temp:  [97.1 °F (36.2 °C)-98.1 °F (36.7 °C)] 98.1 °F (36.7 °C)  Heart Rate:  [86-96] 95  Resp:  [16-18] 16  BP: (105-158)/(56-73) 129/70    Neurological Exam  Mental Status  Awake, alert and oriented to person, place and time. Speech is normal. Language is fluent with no aphasia.    Cranial Nerves  CN II: Visual fields full to confrontation.  CN III, IV, VI: Extraocular movements intact bilaterally. Normal lids and orbits bilaterally. Pupils equal round and reactive to light bilaterally.  CN V: Facial sensation is normal.  CN VII: Full and symmetric facial movement.  CN VIII: Hearing is normal.  CN IX, X: Palate elevates symmetrically. Normal gag reflex.  CN XI: Shoulder shrug strength is normal.  CN XII: Tongue midline without atrophy or fasciculations.    Motor  Decreased muscle bulk throughout. No fasciculations present. Normal muscle tone.  L hand weakness, but otherwise okay. Does have decreased muscle mass overall. .    Sensory  Light touch is normal in upper and lower extremities.     Reflexes  Not assessed.    Coordination  Finger-to-nose, rapid alternating movements and  heel-to-shin normal bilaterally without dysmetria.    Gait  Not assessed.      Physical Exam   Constitutional:   malnourished   Eyes: Pupils are equal, round, and reactive to light. Lids are normal.   Neck: Normal range of motion.   Cardiovascular: Normal rate.   Pulmonary/Chest: Effort normal.   Neurological: Coordination normal.   Psychiatric: His speech is normal.       Results Review:    I reviewed the patient's new clinical results.    Lab Results (last 24 hours)     Procedure Component Value Units Date/Time    Lipid Panel [[id number]] Collected:  01/02/20 0527    Specimen:  Blood Updated:  01/02/20 0632     Total Cholesterol 119 mg/dL      Triglycerides 61 mg/dL      HDL Cholesterol 41 mg/dL      LDL Cholesterol  66 mg/dL      VLDL Cholesterol 12.2 mg/dL      LDL/HDL Ratio 1.60    Narrative:       Cholesterol Reference Ranges  (U.S. Department of Health and Human Services ATP III Classifications)    Desirable          <200 mg/dL  Borderline High    200-239 mg/dL  High Risk          >240 mg/dL      Triglyceride Reference Ranges  (U.S. Department of Health and Human Services ATP III Classifications)    Normal           <150 mg/dL  Borderline High  150-199 mg/dL  High             200-499 mg/dL  Very High        >500 mg/dL    HDL Reference Ranges  (U.S. Department of Health and Human Services ATP III Classifcations)    Low     <40 mg/dl (major risk factor for CHD)  High    >60 mg/dl ('negative' risk factor for CHD)        LDL Reference Ranges  (U.S. Department of Health and Human Services ATP III Classifcations)    Optimal          <100 mg/dL  Near Optimal     100-129 mg/dL  Borderline High  130-159 mg/dL  High             160-189 mg/dL  Very High        >189 mg/dL    Hemoglobin A1c [[id number]]  (Normal) Collected:  01/02/20 0527    Specimen:  Blood Updated:  01/02/20 0626     Hemoglobin A1C 5.00 %     Narrative:       Hemoglobin A1C Ranges:    Increased Risk for Diabetes  5.7% to 6.4%  Diabetes                      >= 6.5%  Diabetic Goal                < 7.0%    CBC & Differential [[id number]] Collected:  01/02/20 0527    Specimen:  Blood Updated:  01/02/20 0613    Narrative:       The following orders were created for panel order CBC & Differential.  Procedure                               Abnormality         Status                     ---------                               -----------         ------                     CBC Auto Differential[[id number]]        Abnormal            Final result                 Please view results for these tests on the individual orders.    CBC Auto Differential [[id number]]  (Abnormal) Collected:  01/02/20 0527    Specimen:  Blood Updated:  01/02/20 0613     WBC 12.80 10*3/mm3      RBC 3.72 10*6/mm3      Hemoglobin 10.8 g/dL      Hematocrit 32.9 %      MCV 88.4 fL      MCH 29.0 pg      MCHC 32.8 g/dL      RDW 12.8 %      RDW-SD 41.4 fl      MPV 10.8 fL      Platelets 278 10*3/mm3      Neutrophil % 80.1 %      Lymphocyte % 9.5 %      Monocyte % 7.8 %      Eosinophil % 1.8 %      Basophil % 0.3 %      Immature Grans % 0.5 %      Neutrophils, Absolute 10.25 10*3/mm3      Lymphocytes, Absolute 1.22 10*3/mm3      Monocytes, Absolute 1.00 10*3/mm3      Eosinophils, Absolute 0.23 10*3/mm3      Basophils, Absolute 0.04 10*3/mm3      Immature Grans, Absolute 0.06 10*3/mm3      nRBC 0.0 /100 WBC     POC Glucose Once [[id number]]  (Normal) Collected:  01/02/20 0051    Specimen:  Blood Updated:  01/02/20 0102     Glucose 85 mg/dL      Comment: RN NotifiedOperator: [id number] MUNOZ [facility] CenterMeter ID: [id number]       Procalcitonin [[id number]]  (Abnormal) Collected:  01/01/20 1101    Specimen:  Blood Updated:  01/01/20 2234     Procalcitonin 0.09 ng/mL     Narrative:       As a Marker for Sepsis (Non-Neonates):   1. <0.5 ng/mL represents a low risk of severe sepsis and/or septic shock.  1. >2 ng/mL represents a high risk of severe sepsis and/or septic shock.    As a Marker for Lower Respiratory Tract  "Infections that require antibiotic therapy:  PCT on Admission     Antibiotic Therapy             6-12 Hrs later  > 0.5                Strongly Recommended            >0.25 - <0.5         Recommended  0.1 - 0.25           Discouraged                   Remeasure/reassess PCT  <0.1                 Strongly Discouraged          Remeasure/reassess PCT      As 28 day mortality risk marker: \"Change in Procalcitonin Result\" (> 80 % or <=80 %) if Day 0 (or Day 1) and Day 4 values are available. Refer to http://[email or web address] HospitalPath.Topct-calculator.com/   Change in PCT <=80 %   A decrease of PCT levels below or equal to 80 % defines a positive change in PCT test result representing a higher risk for 28-day all-cause mortality of patients diagnosed with severe sepsis or septic shock.  Change in PCT > 80 %   A decrease of PCT levels of more than 80 % defines a negative change in PCT result representing a lower risk for 28-day all-cause mortality of patients diagnosed with severe sepsis or septic shock.                  POC Glucose Once [[id number]]  (Normal) Collected:  01/01/20 1929    Specimen:  Blood Updated:  01/01/20 1944     Glucose 82 mg/dL      Comment: RN NotifiedOperator: [id number] MUNOZ JENNIFERMeter ID: [id number]       Urinalysis With Culture If Indicated - Urine, Clean Catch [[id number]]  (Abnormal) Collected:  01/01/20 1804    Specimen:  Urine, Clean Catch Updated:  01/01/20 1819     Color, UA Yellow     Appearance, UA Clear     pH, UA <=5.0     Specific Gravity, UA 1.090     Comment: Result obtained by Refractometer        Glucose, UA Negative     Ketones, UA 40 mg/dL (2+)     Bilirubin, UA Negative     Blood, UA Negative     Protein, UA Negative     Leuk Esterase, UA Negative     Nitrite, UA Negative     Urobilinogen, UA 1.0 E.U./dL    Narrative:       Urine microscopic not indicated.    POC Glucose Once [[id number]]  (Normal) Collected:  01/01/20 1710    Specimen:  Blood Updated:  01/01/20 1721     Glucose 80 mg/dL      " Comment: : [phone] JANET Payan ID: [id number]       Blood Culture - Blood, Arm, Right [[id number]] Collected:  01/01/20 1240    Specimen:  Blood from Arm, Right Updated:  01/01/20 1254    Blood Culture - Blood, Arm, Left [[id number]] Collected:  01/01/20 1101    Specimen:  Blood from Arm, Left Updated:  01/01/20 1101    Troponin [[id number]]  (Normal) Collected:  01/01/20 0859    Specimen:  Blood Updated:  01/01/20 1007     Troponin T <0.010 ng/mL     Narrative:       Troponin T Reference Range:  <= 0.03 ng/mL-   Negative for AMI  >0.03 ng/mL-     Abnormal for myocardial necrosis.  Clinicians would have to utilize clinical acumen, EKG, Troponin and serial changes to determine if it is an Acute Myocardial Infarction or myocardial injury due to an underlying chronic condition.     [place] Draw [[id number]] Collected:  01/01/20 0859    Specimen:  Blood Updated:  01/01/20 1000    Narrative:       The following orders were created for panel order [place] Draw.  Procedure                               Abnormality         Status                     ---------                               -----------         ------                     Light Blue Top[[id number]]                                   Final result               Green Top (Gel)[[id number]]                                  Final result               Lavender Top[[id number]]                                     Final result               Gold Top - SST[[id number]]                                   Final result                 Please view results for these tests on the individual orders.    Gold Top - SST [[id number]] Collected:  01/01/20 0859    Specimen:  Blood Updated:  01/01/20 1000     Extra Tube Hold for add-ons.     Comment: Auto resulted.       Light Blue Top [[id number]] Collected:  01/01/20 0859    Specimen:  Blood Updated:  01/01/20 1000     Extra Tube hold for add-on     Comment: Auto resulted       Green Top (Gel) [[id number]] Collected:  01/01/20  0859    Specimen:  Blood Updated:  01/01/20 1000     Extra Tube Hold for add-ons.     Comment: Auto resulted.       Luica Top [[id number]] Collected:  01/01/20 0859    Specimen:  Blood Updated:  01/01/20 1000     Extra Tube hold for add-on     Comment: Auto resulted       Comprehensive Metabolic Panel [[id number]]  (Abnormal) Collected:  01/01/20 0859    Specimen:  Blood Updated:  01/01/20 0930     Glucose 95 mg/dL      BUN 16 mg/dL      Creatinine 1.33 mg/dL      Sodium 138 mmol/L      Potassium 4.0 mmol/L      Chloride 97 mmol/L      CO2 26.0 mmol/L      Calcium 9.4 mg/dL      Total Protein 8.7 g/dL      Albumin 4.10 g/dL      ALT (SGPT) 7 U/L      AST (SGOT) 13 U/L      Alkaline Phosphatase 102 U/L      Total Bilirubin 0.6 mg/dL      eGFR Non African Amer 54 mL/min/1.73      Globulin 4.6 gm/dL      A/G Ratio 0.9 g/dL      BUN/Creatinine Ratio 12.0     Anion Gap 15.0 mmol/L     Narrative:       GFR Normal >60  Chronic Kidney Disease <60  Kidney Failure <15      Protime-INR [[id number]]  (Normal) Collected:  01/01/20 0859    Specimen:  Blood Updated:  01/01/20 0919     Protime 14.9 Seconds      INR 1.19    Narrative:       Therapeutic range for most indications is 2.0-3.0 INR,  or 2.5-3.5 for mechanical heart valves.    CBC & Differential [[id number]] Collected:  01/01/20 0859    Specimen:  Blood Updated:  01/01/20 0908    Narrative:       The following orders were created for panel order CBC & Differential.  Procedure                               Abnormality         Status                     ---------                               -----------         ------                     CBC Auto Differential[[id number]]        Abnormal            Final result                 Please view results for these tests on the individual orders.    CBC Auto Differential [[id number]]  (Abnormal) Collected:  01/01/20 0859    Specimen:  Blood Updated:  01/01/20 0908     WBC 21.16 10*3/mm3      RBC 4.30 10*6/mm3      Hemoglobin 12.7 g/dL       Hematocrit 38.9 %      MCV 90.5 fL      MCH 29.5 pg      MCHC 32.6 g/dL      RDW 12.9 %      RDW-SD 42.6 fl      MPV 10.4 fL      Platelets 366 10*3/mm3      Neutrophil % 84.9 %      Lymphocyte % 6.6 %      Monocyte % 7.6 %      Eosinophil % 0.2 %      Basophil % 0.3 %      Immature Grans % 0.4 %      Neutrophils, Absolute 17.98 10*3/mm3      Lymphocytes, Absolute 1.39 10*3/mm3      Monocytes, Absolute 1.60 10*3/mm3      Eosinophils, Absolute 0.04 10*3/mm3      Basophils, Absolute 0.06 10*3/mm3      Immature Grans, Absolute 0.09 10*3/mm3      nRBC 0.0 /100 WBC     POC Glucose Once [[id number]]  (Normal) Collected:  01/01/20 0851    Specimen:  Blood Updated:  01/01/20 0908     Glucose 92 mg/dL      Comment: : [phone] CYNDEE OSORIOMeter ID: [id number]           Ct Angiogram Head    Result Date: 1/1/2020  1.  CTA findings as described above. Electronically signed by:  Quinn Triplett MD  1/1/2020 11:47 AM CST Workstation: [id number]    Ct Angiogram Neck    Result Date: 1/1/2020  1.  CTA findings as described above. Electronically signed by:  Quinn Triplett MD  1/1/2020 11:47 AM CST Workstation: [id number]    Mri Brain Without Contrast    Result Date: 1/1/2020  Acute/subacute infarct  on the right corresponding to the described same day CT hypodensity predominantly extending posteriorly and inferiorly to the level of the central sulcus measuring approximately 1.3 x 3.6 cm in its AP and transverse dimensions respectively. No associated gradient susceptibility or midline shift. Redemonstrated moderate sequela of chronic microvascular disease involving the cerebral white matter and remote lacunar infarcts involving the thalami and cerebral white matter. Report of findings given to Marisel GALINDO, nurse caring for this patient on 1/1/2020 at 2:55 PM CST. She will notify Dr. Ward of these results. Electronically signed by:  Ja Bender MD  1/1/2020 3:10 PM CST Workstation: [id number]    Xr Chest 1 View    Result Date:  1/1/2020  No acute cardiopulmonary abnormality. Electronically signed by:  Quinn Triplett MD  1/1/2020 9:44 AM CST Workstation: [id number]    Ct Head Without Contrast Stroke Protocol    Result Date: 1/1/2020  1.  New small right frontal lobe gray-white matter focus of hypodensity suspicious for acute infarct. 2.  Bilateral frontal lobe periventricular deep white matter foci of hypodensities most likely representing chronic ischemic gliosis secondary to microvascular disease. 3.  Bilateral frontal lobe deep white matter old lacunar infarcts. 4.  Bilateral thalami old lacunar infarct. 5.  ER clinician notified of findings by phone at 9:30 AM on 1/1/2020. Electronically signed by:  Quinn Triplett MD  1/1/2020 9:39 AM CST Workstation: [id number]    Results for orders placed during the hospital encounter of 01/01/20   Adult Transthoracic Echo Complete W/ Cont if Necessary Per Protocol    Narrative · The study is technically difficult for diagnosis.  · Left ventricular systolic function is mildly decreased. LVEF: 48%. Mild   global hypokinesis. Grade one diastolic dysfunction.  · Right ventricle systolic function is normal.  · No evidence of a patent foramen ovale. No evidence of an atrial septal   defect present. Saline test results are negative.  · No intracardiac thrombus or mass.            Assessment/Plan     Assessment/Plan:  65 LHWM with h/o HTN, CAD, PAD, here with L sided wekaness, which has improved since it started. Still has some L hand weakness, but feels much better. His MRI brain showed embolic looking RMCA stroke. Mod WMD, incl multiple old lacunar strokes. CTA head/neck looked okay.       1. RMCA stroke- Unknown etiology. He had 2D echo, which showed mild global hypokinesis, but no significant cardiac findings. He had recently started taking ASA/plavix every other day instead of daily, because of bruising. Will resume it back to ASA 81 mg and plavix 75mg daily, along with lipitor 40mg daily. A1C-5, LDL-66. Will get STACY  today to look for other cardiac source. He has had weight loss of 21 lbs in 2 mths, but it is possibly from decreased dietary intake. Recommend him to f/u closely with PCP, and if he does develop blood in sputum, to report it to PCP.   2. HTN- Normal BP goals. Okay to start home BP meds.   3. CAD/PAD- starting ASA and plavix along with statins.   4. Leukocytosis- Management as per hospitalist. Improved.   5. Activity- PT/OT eval today.   6. Diet- Health heart, high protein diet. Encourage to gain weight.   7. Disposition- If STACY is negative, and he clears therapy eval, poss dc home today.     Case was d/w pt, nursing and hosptialist. Thank you for the consult.           Bernardino Ward MD  01/02/20  8:48 AM    This was an audio and video enabled telemedicine encounter.

## 2020-01-02 NOTE — THERAPY EVALUATION
Acute Care - Physical Therapy Initial Evaluation  [facility]     Patient Name: Aubrey Montilla  : 1955  MRN: [id number]  Today's Date: 2020   Onset of Illness/Injury or Date of Surgery: 20  Date of Referral to PT: 20  Referring Physician: Dr. Pratt      Admit Date: 2020    Visit Dx:     ICD-10-CM ICD-9-CM   1. Cerebrovascular accident (CVA), unspecified mechanism (CMS/HCC) I63.9 434.91   2. Symbolic dysfunction R48.9 784.60   3. Impaired mobility and ADLs Z74.09 799.89   4. Impaired functional mobility, balance, gait, and endurance Z74.09 V49.89     Patient Active Problem List   Diagnosis   • Cerebrovascular accident (CVA) (CMS/HCC)     Past Medical History:   Diagnosis Date   • Elevated cholesterol    • Hypertension      Past Surgical History:   Procedure Laterality Date   • ENDOSCOPY N/A 2017    Procedure: ESOPHAGOGASTRODUODENOSCOPY;  Surgeon: Van Khoury DO;  Location: [facility] ENDOSCOPY;  Service:    • ENDOSCOPY N/A 2019    Procedure: ESOPHAGOGASTRODUODENOSCOPY;  Surgeon: Van Khoury DO;  Location: [facility] ENDOSCOPY;  Service: Gastroenterology        PT ASSESSMENT (last 12 hours)      Physical Therapy Evaluation     Row Name 20 1040          PT Evaluation Time/Intention    Subjective Information  complains of;pain  -CORINNA     Document Type  evaluation  -CORINNA     Mode of Treatment  co-treatment;physical therapy;occupational therapy  -CORINNA     Patient Effort  good  -CORINNA     Symptoms Noted During/After Treatment  fatigue  -CORINNA     Row Name 20 1040          General Information    Patient Profile Reviewed?  yes  -CORINNA     Onset of Illness/Injury or Date of Surgery  20  -CORINNA     Referring Physician  Dr. Pratt  -CORINNA     Patient Observations  alert;cooperative;agree to therapy  -CORINNA     Patient/Family Observations  no family present  -CORINNA     General Observations of Patient  pt supined;HOB up on room air, tele, IV  -CORINNA     Prior Level of Function   independent:;all household mobility;community mobility;transfer;bed mobility;ADL's;home management;cooking;cleaning;using stairs  -     Equipment Currently Used at Home  none has SC  -     Existing Precautions/Restrictions  fall  -     Limitations/Impairments  safety/cognitive  -     Risks Reviewed  patient:;LOB;nausea/vomiting;dizziness;increased discomfort;change in vital signs;lines disloged  -     Benefits Reviewed  patient:;improve function;increase independence;increase strength;increase balance;decrease pain;decrease risk of DVT;improve skin integrity;increase knowledge  -     Barriers to Rehab  medically complex  -     Row Name 01/02/20 1040          Relationship/Environment    Lives With  spouse  -     Row Name 01/02/20 1040          Resource/Environmental Concerns    Current Living Arrangements  home/apartment/condo  -     Row Name 01/02/20 1040          Living Environment    Home Accessibility  stairs to enter home  -     Row Name 01/02/20 1040          Home Main Entrance    Number of Stairs, Main Entrance  two  -     Stair Railings, Main Entrance  railing on left side (ascending)  -     Row Name 01/02/20 1040          Cognitive Assessment/Intervention- PT/OT    Affect/Mental Status (Cognitive)  WFL  -     Orientation Status (Cognition)  oriented x 4  -     Follows Commands (Cognition)  follows one step commands;75-90% accuracy  -     Safety Deficit (Cognitive)  moderate deficit  -     Personal Safety Interventions  fall prevention program maintained;gait belt;nonskid shoes/slippers when out of bed;supervised activity  -     Row Name 01/02/20 1040          Safety Issues, Functional Mobility    Safety Issues Affecting Function (Mobility)  awareness of need for assistance;insight into deficits/self awareness;safety precaution awareness;safety precautions follow-through/compliance;positioning of assistive device;sequencing abilities  -     Impairments Affecting Function  (Mobility)  balance;cognition;coordination;endurance/activity tolerance;grasp;motor control;motor planning;range of motion (ROM);strength  -     Row Name 01/02/20 1040          Bed Mobility Assessment/Treatment    Bed Mobility Assessment/Treatment  supine-sit;sit-supine  -     Supine-Sit [place] (Bed Mobility)  minimum assist (75% patient effort);verbal cues;nonverbal cues (demo/gesture)  -     Sit-Supine [place] (Bed Mobility)  contact guard  -     Bed Mobility, Safety Issues  decreased use of arms for pushing/pulling;decreased use of legs for bridging/pushing  -     Assistive Device (Bed Mobility)  bed rails;head of bed elevated  -     Comment (Bed Mobility)  required min assistance for dynamic sitting balance  -     Row Name 01/02/20 1040          Transfer Assessment/Treatment    Transfer Assessment/Treatment  sit-stand transfer;stand-sit transfer;toilet transfer  -     Sit-Stand [place] (Transfers)  minimum assist (75% patient effort);verbal cues;nonverbal cues (demo/gesture)  -     Stand-Sit [place] (Transfers)  minimum assist (75% patient effort);verbal cues;nonverbal cues (demo/gesture)  -     Row Name 01/02/20 1040          Sit-Stand Transfer    Assistive Device (Sit-Stand Transfers)  walker, front-wheeled  -     Row Name 01/02/20 1040          Stand-Sit Transfer    Assistive Device (Stand-Sit Transfers)  walker, front-wheeled  -     Row Name 01/02/20 1040          Gait/Stairs Assessment/Training    Gait/Stairs Assessment/Training  gait/ambulation assistive device  -     [place] Level (Gait)  nonverbal cues (demo/gesture);verbal cues  -     Assistive Device (Gait)  walker, front-wheeled  -     Distance in Feet (Gait)  10ft,20ft  -     Deviations/Abnormal Patterns (Gait)  base of support, narrow;stride length decreased;gait speed decreased;stephan decreased  -     Bilateral Gait Deviations  heel strike decreased  -     Comment (Gait/Stairs)  pt  waddles with ambulation ;withoujt any appreciable heelstrike or toe-off   -[facility] Name 01/02/20 1040          General ROM    GENERAL ROM COMMENTS  BLE: AROM  WFL BLE  -[facility] Name 01/02/20 1040          MMT (Manual Muscle Testing)    General MMT Comments  BLE: grossly 4-/5  -[facility] Name 01/02/20 1040          Motor Assessment/Intervention    Additional Documentation  Balance (Group);Balance Interventions (Group)  -[facility] Name 01/02/20 1033          Balance    Balance  static sitting balance;static standing balance;dynamic sitting balance;dynamic standing balance  -[facility] Name 01/02/20 1040          Static Sitting Balance    Level of [place] (Unsupported Sitting, Static Balance)  contact guard assist  -     Sitting Position (Unsupported Sitting, Static Balance)  sitting on edge of bed  -[facility] Name 01/02/20 1040          Dynamic Sitting Balance    Level of [place], Reaches Outside Midline (Sitting, Dynamic Balance)  minimal assist, 75% patient effort  -     Sitting Position, Reaches Outside Midline (Sitting, Dynamic Balance)  sitting on edge of bed  -[facility] Name 01/02/20 1040          Static Standing Balance    Level of [place] (Supported Standing, Static Balance)  contact guard assist  -     Assistive Device Utilized (Supported Standing, Static Balance)  walker, rolling  -[facility] Name 01/02/20 1040          Dynamic Standing Balance    Level of [place], Reaches Outside Midline (Standing, Dynamic Balance)  minimal assist, 75% patient effort  -     Assistive Device Utilized (Supported Standing, Dynamic Balance)  walker, rolling  -[facility] Name 01/02/20 1040          Balance Interventions    Systems Impairment Contributing to Balance Disturbance (Balance)  neuromuscular;musculoskeletal  -     Identified Impairments Contributing to Balance Disturbance (Balance)  strength decreased  -     Ankle Strategy Assessment (Balance)  deficit;delayed  -     Hip  Strategy Assessment (Balance)  deficit;delayed  -     Stepping Strategy Assessment (Balance)  deficit;delayed  -[facility] Name 01/02/20 1040          Sensory Assessment/Intervention    Sensory General Assessment  light touch sensation deficits identified;proprioception deficits identified  -[facility] Name 01/02/20 1040          Hearing Assessment    Hearing Status  hearing impairment, bilaterally  -[facility] Name 01/02/20 1040          Light Touch Sensation Assessment    Left Lower Extremity: Light Touch Sensation Assessment  intact  -     Right Lower Extremity: Light Touch Sensation Assessment  intact  -[facility] Name 01/02/20 1040          Proprioception Assessment    Left Lower Extremity: Proprioception Assessment  intact  -     Right Lower Extremity: Proprioception Assessment  intact  -[facility] Name 01/02/20 1040          Vision Assessment/Intervention    Visual Impairment/Limitations  corrective lenses full time  -[facility] Name 01/02/20 1040          Pain Assessment    Additional Documentation  Pain Scale: Numbers Pre/Post-Treatment (Group)  -[facility] Name 01/02/20 1040          Pain Scale: Numbers Pre/Post-Treatment    Pain Scale: Numbers, Pretreatment  0/10 - no pain  -     Pain Scale: Numbers, Post-Treatment  1/10  -[facility]             Wound 01/01/20 1200 Left anterior toe Abrasion    Wound - Properties Group Date first assessed: 01/01/20  -AK Time first assessed: 1200  -AK Present on Hospital Admission: Y  -AK Side: Left  -AK Orientation: anterior  -AK Location: toe  -AK, 2nd toe   Primary Wound Type: Abrasion  -AK, toe nail removed traumatically      Row Name 01/02/20 1040          Plan of Care Review    Plan of Care Reviewed With  patient  -[facility] Name 01/02/20 1040          Physical Therapy Clinical Impression    Date of Referral to PT  01/01/20  -     PT Diagnosis (PT Clinical Impression)  impaired gait and mobility  -     Prognosis (PT Clinical Impression)  good  -      Patient/Family Goals Statement (PT Clinical Impression)  return home to PLOF, decrease fall risk  -     Criteria for Skilled Interventions Met (PT Clinical Impression)  yes;treatment indicated  -     Pathology/Pathophysiology Noted (Describe Specifically for Each System)  musculoskeletal;neuromuscular  -     Impairments Found (describe specific impairments)  aerobic capacity/endurance;arousal, attention, and cognition;ergonomics and body mechanics;gait, locomotion, and balance;motor function  -     Functional Limitations in Following Categories (Describe Specific Limitations)  self-care;home management;community/leisure  -     Disability: Inability to Perform Actions/Activities of Required Roles (describe specific disability)  community/leisure  -     Rehab Potential (PT Clinical Summary)  good, to achieve stated therapy goals  -     Predicted Duration of Therapy (PT)  until discharge or goals met  -     Care Plan Review (PT)  evaluation/treatment results reviewed;care plan/treatment goals reviewed;risks/benefits reviewed;current/potential barriers reviewed;patient/other agree to care plan  -     Row Name 01/02/20 1040          Vital Signs    Pre Systolic BP Rehab  115  -CORINNA     Pre Treatment Diastolic BP  57  -CORINNA     Post Systolic BP Rehab  142  -CORINNA     Post Treatment Diastolic BP  65  -CORINNA     Pretreatment Heart Rate (beats/min)  92  -CORINNA     Posttreatment Heart Rate (beats/min)  103  -CORINNA     Pre SpO2 (%)  95  -CORINNA     O2 Delivery Pre Treatment  room air  -CORINNA     Post SpO2 (%)  97  -CORINNA     O2 Delivery Post Treatment  room air  -CORINNA     Pre Patient Position  Supine  -CORINNA     Post Patient Position  Supine  -CORINNA     Row Name 01/02/20 1040          Physical Therapy Goals    Bed Mobility Goal Selection (PT)  bed mobility, PT goal 1  -CORINNA     Transfer Goal Selection (PT)  transfer, PT goal 1  -CORINNA     Gait Training Goal Selection (PT)  gait training, PT goal 1;gait training, PT goal 2  -CORINNA     Balance Goal  Selection (PT)  balance, PT goal 1  -CORINNA     Additional Documentation  Balance Goal Selection (PT) (Row)  -     Row Name 01/02/20 1040          Bed Mobility Goal 1 (PT)    Activity/Assistive Device (Bed Mobility Goal 1, PT)  sit to supine;supine to sit  -CORINNA     [place] Level/Cues Needed (Bed Mobility Goal 1, PT)  standby assist  -CORINNA     Time Frame (Bed Mobility Goal 1, PT)  3 days  -CORINNA     Progress/Outcomes (Bed Mobility Goal 1, PT)  goal not met  -     Row Name 01/02/20 1040          Transfer Goal 1 (PT)    Activity/Assistive Device (Transfer Goal 1, PT)  sit-to-stand/stand-to-sit;bed-to-chair/chair-to-bed;toilet  -     [place] Level/Cues Needed (Transfer Goal 1, PT)  contact guard assist;standby assist  -CORINNA     Time Frame (Transfer Goal 1, PT)  3 days  -CORINNA     Progress/Outcome (Transfer Goal 1, PT)  goal not met  -     Row Name 01/02/20 1040          Gait Training Goal 1 (PT)    Activity/Assistive Device (Gait Training Goal 1, PT)  assistive device use;gait (walking locomotion)  -     [place] Level (Gait Training Goal 1, PT)  minimum assist (75% or more patient effort)  -CORINNA     Distance (Gait Goal 1, PT)  50ft  -CORINNA     Time Frame (Gait Training Goal 1, PT)  3 days  -CORINNA     Progress/Outcome (Gait Training Goal 1, PT)  goal not met  -     Row Name 01/02/20 1040          Gait Training Goal 2 (PT)    Activity/Assistive Device (Gait Training Goal 2, PT)  gait (walking locomotion);assistive device use  -CORINNA     [place] Level (Gait Training Goal 2, PT)  contact guard assist;standby assist  -CORINNA     Time Frame (Gait Training Goal 2, PT)  5 days  -CORINNA     Progress/Outcome (Gait Training Goal 2, PT)  goal not met  -     Row Name 01/02/20 1040          Balance Goal 1 (PT)    Activity/Assistive Device (Balance Goal 1, PT)  standing, static;standing, dynamic  -CORINNA     [place] Level/Cues Needed (Balance Goal 1, PT)  contact guard assist  -CORINNA     Time Frame (Balance Goal 1, PT)  5 days  -CORINNA      Progress/Outcomes (Balance Goal 1, PT)  goal not met  -CORINNA     Row Name 01/02/20 1040          Positioning and Restraints    Pre-Treatment Position  in bed  -CORINNA     Post Treatment Position  bed  -CORINNA     In Bed  call light within reach;encouraged to call for assist;exit alarm on;notified nsg;side rails up x2  -CORINNA       User Key  (r) = Recorded By, (t) = Taken By, (c) = Cosigned By    Initials Name Provider Type    Luci Montes, PT Physical Therapist    Marisel Jimenez RN Registered Nurse          PT Recommendation and Plan  Anticipated Discharge Disposition (PT): anticipate therapy at next level of care, inpatient rehabilitation facility  Planned Therapy Interventions (PT Eval): balance training, bed mobility training, gait training, home exercise program, motor coordination training, neuromuscular re-education, patient/family education, stair training, strengthening, transfer training  Therapy Frequency (PT Clinical Impression): daily  Outcome Summary/Treatment Plan (PT)  Anticipated Equipment Needs at Discharge (PT): (to be determined;likely gait device)  Anticipated Discharge Disposition (PT): anticipate therapy at next level of care, inpatient rehabilitation facility  Plan of Care Reviewed With: patient  Outcome Measures     Row Name 01/02/20 1040 01/02/20 1033          How much help from another person do you currently need...    Turning from your back to your side while in flat bed without using bedrails?  3  -CORINNA  --     Moving from lying on back to sitting on the side of a flat bed without bedrails?  3  -CORINNA  --     Moving to and from a bed to a chair (including a wheelchair)?  3  -CORINNA  --     Standing up from a chair using your arms (e.g., wheelchair, bedside chair)?  3  -CORINNA  --     Climbing 3-5 steps with a railing?  2  -CORINNA  --     To walk in hospital room?  2  -CORINNA  --     AM-PAC 6 Clicks Score (PT)  16  -CORINNA  --        How much help from another is currently needed...    Putting on and taking off  regular lower body clothing?  --  2  -     Bathing (including washing, rinsing, and drying)  --  2  -     Toileting (which includes using toilet bed pan or urinal)  --  2  -     Putting on and taking off regular upper body clothing  --  2  -     Taking care of personal grooming (such as brushing teeth)  --  2  -BH     Eating meals  --  2  -     AM-PAC 6 Clicks Score (OT)  --  12  -        Modified Kumar Scale    Modified [place] Scale  4 - Moderately severe disability.  Unable to walk without assistance, and unable to attend to own bodily needs without assistance.  -CORINNA  4 - Moderately severe disability.  Unable to walk without assistance, and unable to attend to own bodily needs without assistance.  -        Functional Assessment    Outcome Measure Options  AM-PAC 6 Clicks Basic Mobility (PT);Modified [place]  -  AM-PAC 6 Clicks Daily Activity (OT);Modified Kumar;Modified [place] Index  -       User Key  (r) = Recorded By, (t) = Taken By, (c) = Cosigned By    Initials Name Provider Type     Bruna Gentile, OTR/L Occupational Therapist    Luci Montes, PT Physical Therapist         Time Calculation:   PT Charges     Row Name 01/02/20 1604             Time Calculation    Start Time  1040  -      Stop Time  1108  -      Time Calculation (min)  28 min  -      PT Received On  01/02/20  -      PT Goal Re-Cert Due Date  01/15/20  -         Time Calculation- PT    Total Timed Code Minutes- PT  0 minute(s)  -        User Key  (r) = Recorded By, (t) = Taken By, (c) = Cosigned By    Initials Name Provider Type    Luci Montes PT Physical Therapist        Therapy Charges for Today     Code Description Service Date Service Provider Modifiers Qty    [id number]  PT EVAL MOD COMPLEXITY 2 1/2/2020 Luci Macias, PT GP 1          PT G-Codes  Outcome Measure Options: AM-PAC 6 Clicks Basic Mobility (PT), Modified Kumar  AM-PAC 6 Clicks Score (PT): 16  AM-PAC 6 Clicks Score (OT):  12  Modified [place] Scale: 4 - Moderately severe disability.  Unable to walk without assistance, and unable to attend to own bodily needs without assistance.      Luci Macias, PT  1/2/2020

## 2020-01-03 PROCEDURE — 94799 UNLISTED PULMONARY SVC/PX: CPT

## 2020-01-03 PROCEDURE — 94760 N-INVAS EAR/PLS OXIMETRY 1: CPT

## 2020-01-03 PROCEDURE — G0407 INPT/TELE FOLLOW UP 25: HCPCS | Performed by: PSYCHIATRY & NEUROLOGY

## 2020-01-03 PROCEDURE — 97530 THERAPEUTIC ACTIVITIES: CPT

## 2020-01-03 PROCEDURE — 97110 THERAPEUTIC EXERCISES: CPT

## 2020-01-03 RX ORDER — DOXYCYCLINE 100 MG/1
100 CAPSULE ORAL EVERY 12 HOURS SCHEDULED
Status: DISCONTINUED | OUTPATIENT
Start: 2020-01-03 | End: 2020-01-06 | Stop reason: HOSPADM

## 2020-01-03 RX ORDER — ATENOLOL 25 MG/1
25 TABLET ORAL DAILY
Status: DISCONTINUED | OUTPATIENT
Start: 2020-01-04 | End: 2020-01-06 | Stop reason: HOSPADM

## 2020-01-03 RX ADMIN — DOCUSATE SODIUM 100 MG: 100 CAPSULE, LIQUID FILLED ORAL at 09:12

## 2020-01-03 RX ADMIN — SODIUM CHLORIDE, PRESERVATIVE FREE 10 ML: 5 INJECTION INTRAVENOUS at 20:19

## 2020-01-03 RX ADMIN — GABAPENTIN 200 MG: 100 CAPSULE ORAL at 15:58

## 2020-01-03 RX ADMIN — SERTRALINE HYDROCHLORIDE 100 MG: 50 TABLET ORAL at 09:12

## 2020-01-03 RX ADMIN — ATENOLOL 50 MG: 50 TABLET ORAL at 09:12

## 2020-01-03 RX ADMIN — ASPIRIN 81 MG 81 MG: 81 TABLET ORAL at 09:12

## 2020-01-03 RX ADMIN — GABAPENTIN 200 MG: 100 CAPSULE ORAL at 09:30

## 2020-01-03 RX ADMIN — SODIUM CHLORIDE, PRESERVATIVE FREE 10 ML: 5 INJECTION INTRAVENOUS at 09:12

## 2020-01-03 RX ADMIN — CLOPIDOGREL BISULFATE 75 MG: 75 TABLET ORAL at 09:11

## 2020-01-03 RX ADMIN — ATORVASTATIN CALCIUM 40 MG: 40 TABLET, FILM COATED ORAL at 20:18

## 2020-01-03 RX ADMIN — BUDESONIDE AND FORMOTEROL FUMARATE DIHYDRATE 2 PUFF: 160; 4.5 AEROSOL RESPIRATORY (INHALATION) at 08:10

## 2020-01-03 RX ADMIN — BUDESONIDE AND FORMOTEROL FUMARATE DIHYDRATE 2 PUFF: 160; 4.5 AEROSOL RESPIRATORY (INHALATION) at 21:37

## 2020-01-03 RX ADMIN — DOXYCYCLINE 100 MG: 100 CAPSULE ORAL at 15:58

## 2020-01-03 RX ADMIN — SODIUM CHLORIDE 75 ML/HR: 9 INJECTION, SOLUTION INTRAVENOUS at 01:29

## 2020-01-03 RX ADMIN — GABAPENTIN 200 MG: 100 CAPSULE ORAL at 20:18

## 2020-01-03 RX ADMIN — DOXYCYCLINE 100 MG: 100 CAPSULE ORAL at 20:18

## 2020-01-03 NOTE — PROGRESS NOTES
Stroke Progress Note       Chief Complaint:  L sided weakness    Subjective     Subjective:  Feels better. Was eval by PT/OT yest who recommended acute rehab. Awaiting acute rehab placement now. Got STACY yest, which was negative.     Review of Systems   Constitutional: Positive for appetite change.        Wt loss of 21 lbs in 2 mths from not eating well   HENT: Negative.    Eyes: Negative.    Respiratory: Positive for cough.         Chronic cough   Cardiovascular: Negative.    Gastrointestinal: Negative.    Endocrine: Negative.    Genitourinary: Negative.    Musculoskeletal: Negative.    Allergic/Immunologic: Negative.         Objective      Temp:  [97.4 °F (36.3 °C)-98.3 °F (36.8 °C)] 97.4 °F (36.3 °C)  Heart Rate:  [] 78  Resp:  [12-18] 16  BP: ()/(56-87) 125/59    Neurological Exam  Mental Status  Awake, alert and oriented to person, place and time. Speech is normal. Language is fluent with no aphasia.    Cranial Nerves  CN II: Visual fields full to confrontation.  CN III, IV, VI: Extraocular movements intact bilaterally. Normal lids and orbits bilaterally. Pupils equal round and reactive to light bilaterally.  CN V: Facial sensation is normal.  CN VII: Full and symmetric facial movement.  CN VIII: Hearing is normal.  CN IX, X: Palate elevates symmetrically. Normal gag reflex.  CN XI: Shoulder shrug strength is normal.  CN XII: Tongue midline without atrophy or fasciculations.    Motor  Decreased muscle bulk throughout. No fasciculations present. Normal muscle tone.  L hand weakness, but otherwise okay. Does have decreased muscle mass overall. .    Sensory  Light touch is normal in upper and lower extremities.     Reflexes  Not assessed.    Coordination  Finger-to-nose, rapid alternating movements and heel-to-shin normal bilaterally without dysmetria.    Gait  Not assessed.      Physical Exam   Constitutional:   malnourished   Eyes: Pupils are equal, round, and reactive to light. Lids are normal.    Neck: Normal range of motion.   Cardiovascular: Normal rate.   Pulmonary/Chest: Effort normal.   Neurological: Coordination normal.   Psychiatric: His speech is normal.       Results Review:    I reviewed the patient's new clinical results.    Lab Results (last 24 hours)     Procedure Component Value Units Date/Time    Blood Culture - Blood, Arm, Right [[id number]] Collected:  01/01/20 1240    Specimen:  Blood from Arm, Right Updated:  01/02/20 1300     Blood Culture No growth at 24 hours    Blood Culture - Blood, Arm, Left [[id number]] Collected:  01/01/20 1101    Specimen:  Blood from Arm, Left Updated:  01/02/20 1115     Blood Culture No growth at 24 hours        Ct Angiogram Head    Result Date: 1/1/2020  1.  CTA findings as described above. Electronically signed by:  Quinn Triplett MD  1/1/2020 11:47 AM CST Workstation: [id number]    Ct Angiogram Neck    Result Date: 1/1/2020  1.  CTA findings as described above. Electronically signed by:  Quinn Triplett MD  1/1/2020 11:47 AM CST Workstation: [id number]    Mri Brain Without Contrast    Result Date: 1/1/2020  Acute/subacute infarct  on the right corresponding to the described same day CT hypodensity predominantly extending posteriorly and inferiorly to the level of the central sulcus measuring approximately 1.3 x 3.6 cm in its AP and transverse dimensions respectively. No associated gradient susceptibility or midline shift. Redemonstrated moderate sequela of chronic microvascular disease involving the cerebral white matter and remote lacunar infarcts involving the thalami and cerebral white matter. Report of findings given to Marisel GALINDO, nurse caring for this patient on 1/1/2020 at 2:55 PM CST. She will notify Dr. Ward of these results. Electronically signed by:  Ja Bender MD  1/1/2020 3:10 PM CST Workstation: [id number]    Xr Chest 1 View    Result Date: 1/1/2020  No acute cardiopulmonary abnormality. Electronically signed by:  Quinn Triplett MD  1/1/2020 9:44 AM CST  Workstation: [id number]    Ct Head Without Contrast Stroke Protocol    Result Date: 1/1/2020  1.  New small right frontal lobe gray-white matter focus of hypodensity suspicious for acute infarct. 2.  Bilateral frontal lobe periventricular deep white matter foci of hypodensities most likely representing chronic ischemic gliosis secondary to microvascular disease. 3.  Bilateral frontal lobe deep white matter old lacunar infarcts. 4.  Bilateral thalami old lacunar infarct. 5.  ER clinician notified of findings by phone at 9:30 AM on 1/1/2020. Electronically signed by:  Quinn Triplett MD  1/1/2020 9:39 AM CST Workstation: [id number]    Results for orders placed during the hospital encounter of 01/01/20   Adult Transesophageal Echo (STACY) W/ Cont if Necessary Per Protocol    Narrative Aubrey Montilla is a 64-year-old male with remote history of coronary artery   disease, peripheral vascular disease, previous cerebrovascular event who   presented with tingling and numbness and weakness left side of the body   consistent with a new cerebrovascular event.  Patient was evaluated by   neurology Dr. Ward and transesophageal echocardiogram was recommended.    All risks and benefits were explained to him in detail.    Procedure performed: Transesophageal echocardiogram    The patient was brought to the cardiac catheterization lab in the   posterior pharynx was anesthetized using Cetacaine.  1 mg of Versed and 25   mg of fentanyl was used for conscious sedation.  The transesophageal probe   was advanced into the esophagus and standard images were obtained.    Overall normal LV systolic function with an ejection fraction of 50 to 55%   with no wall motion abnormalities.  No LV mass or thrombus was noted.  Aortic root and proximal ascending aorta was normal in size.  Left atrium was normal in size.  Left atrial appendage was intact with no   thrombus noted.  No smoke noted.  Right atrium was normal in size.  Interatrial septum was intact and  bubble   contrast study showed no evidence of PFO or ASD.  Mitral valve appeared structurally normal with trace to mild mitral   regurgitation.  Tricuspid valve appeared structurally normal with trace   tricuspid regurgitation.  Aortic valve was trileaflet and within normal limits.  Pulmonary valve   appeared structurally normal.    Impression: No intracardiac mass or thrombus noted on the study.  Normal   LV systolic function with an EF of 50 to 55% with no wall motion   abnormalities.  No left atrial mass or thrombus noted.  Left atrial   appendage intact.         Assessment/Plan     Assessment/Plan:  65 LHWM with h/o HTN, CAD, PAD, here with L sided wekaness, which has improved since it started. Still has some L hand weakness, but feels much better. His MRI brain showed embolic looking RMCA stroke. Mod WMD, incl multiple old lacunar strokes. CTA head/neck looked okay.       1. RMCA stroke- Cryptogenic etiology. He had 2D echo and STACY- both of which shows no clear cardiac source. He had recently started taking ASA/plavix every other day instead of daily, because of bruising. We have resumed it back to ASA 81 mg and plavix 75mg daily, along with lipitor 40mg daily. A1C-5, LDL-66. He has had weight loss of 21 lbs in 2 mths, but it is possibly from decreased dietary intake. Recommend him to f/u closely with PCP @ [facility], and if he does develop blood in sputum, to report it to PCP.   2. HTN- Normal BP goals. Okay to start home BP meds.   3. CAD/PAD- started ASA and plavix along with statins.   4. Leukocytosis- Much improved. Management as per hospitalist.   5. Activity- PT/OT. Awaiting acute rehab.   6. Diet- Health heart, high protein diet. Encourage to gain weight.   7. Disposition- Awaiting rehab.     Case was d/w pt, nursing and hosptialist. Will sign off for now. Please feel free to call with questions. Thank you for the consult.           Bernardino Ward MD  01/03/20  8:08 AM    This was an audio and video  enabled telemedicine encounter.

## 2020-01-03 NOTE — THERAPY TREATMENT NOTE
Acute Care - Occupational Therapy Treatment Note  [facility]     Patient Name: Aubrey Montilla  : 1955  MRN: [id number]  Today's Date: 1/3/2020  Onset of Illness/Injury or Date of Surgery: 20  Date of Referral to OT: 20  Referring Physician: Dr. Pratt    Admit Date: 2020       ICD-10-CM ICD-9-CM   1. Cerebrovascular accident (CVA), unspecified mechanism (CMS/HCC) I63.9 434.91   2. Symbolic dysfunction R48.9 784.60   3. Impaired mobility and ADLs Z74.09 799.89   4. Impaired functional mobility, balance, gait, and endurance Z74.09 V49.89     Patient Active Problem List   Diagnosis   • Cerebrovascular accident (CVA) (CMS/HCC)     Past Medical History:   Diagnosis Date   • Elevated cholesterol    • Hypertension      Past Surgical History:   Procedure Laterality Date   • ENDOSCOPY N/A 2017    Procedure: ESOPHAGOGASTRODUODENOSCOPY;  Surgeon: Van Khoury DO;  Location: [facility] ENDOSCOPY;  Service:    • ENDOSCOPY N/A 2019    Procedure: ESOPHAGOGASTRODUODENOSCOPY;  Surgeon: Van Khoury DO;  Location: [facility] ENDOSCOPY;  Service: Gastroenterology       Therapy Treatment    Rehabilitation Treatment Summary     Row Name 20 1325             Treatment Time/Intention    Discipline  occupational therapy assistant  -CS      Document Type  therapy note (daily note)  -CS      Mode of Treatment  occupational therapy  -CS      Therapy Frequency (OT Eval)  daily  -CS      Patient Effort  fair  -CS      Existing Precautions/Restrictions  fall  -CS      Recorded by [CS] Simi Rodriguez COTA/L 20 1548      Row Name 20 1325             Vital Signs    Pretreatment Heart Rate (beats/min)  78  -CS      Pre SpO2 (%)  96  -CS      O2 Delivery Pre Treatment  room air  -CS      Pre Patient Position  Supine  -CS      Intra Patient Position  Sitting  -CS      Post Patient Position  Supine  -CS      Recorded by [CS] Simi Rodriguez COTA/L 20 1548      Row Name  01/03/20 1325             Cognitive Assessment/Intervention- PT/OT    Affect/Mental Status (Cognitive)  WFL  -CS      Orientation Status (Cognition)  oriented x 4  -CS      Recorded by [CS] Simi Rodriguez COTA/L 01/03/20 1548      Row Name 01/03/20 1325             Bed Mobility Assessment/Treatment    Supine-Sit [place] (Bed Mobility)  conditional independence  -CS      Sit-Supine [place] (Bed Mobility)  conditional independence  -CS      Assistive Device (Bed Mobility)  bed rails;head of bed elevated  -CS      Recorded by [CS] Simi Rodriguez COTA/L 01/03/20 1548      Row Name 01/03/20 1325             Motor Skills Assessment/Interventions    Additional Documentation  Fine Motor Testing & Training (Group);Therapeutic Exercise (Group)  -CS      Recorded by [CS] Simi Rodriguez COTA/L 01/03/20 1548      Row Name 01/03/20 1325             Therapeutic Exercise    Upper Extremity (Therapeutic Exercise)  bicep curl, bilateral  -CS      Upper Extremity Range of Motion (Therapeutic Exercise)  shoulder flexion/extension, bilateral;shoulder internal/external rotation, bilateral;wrist flexion/extension, bilateral  -CS      Hand (Therapeutic Exercise)  finger flexion/extension, bilateral  -CS      Weight/Resistance (Therapeutic Exercise)  1 pound  -CS      Exercise Type (Therapeutic Exercise)  AROM (active range of motion)  -CS      Position (Therapeutic Exercise)  seated  -CS      Sets/Reps (Therapeutic Exercise)  1/10-20  -CS      Equipment (Therapeutic Exercise)  free weight, barbell  -CS      Expected Outcome (Therapeutic Exercise)  improve functional tolerance, self-care activity;improve performance, transfer skills;increase active range of motion  -CS      Recorded by [CS] Simi Rodriguez COTA/L 01/03/20 1548      Row Name 01/03/20 1325             Static Sitting Balance    Level of [place] (Unsupported Sitting, Static Balance)  contact guard assist  -CS      Sitting Position (Unsupported  Sitting, Static Balance)  sitting on edge of bed  -CS      Time Able to Maintain Position (Unsupported Sitting, Static Balance)  more than 5 minutes  -CS      Recorded by [CS] Simi Rodriguez COTA/L 01/03/20 1548      Row Name 01/03/20 1325             Dynamic Sitting Balance    Level of [place], Reaches Outside Midline (Sitting, Dynamic Balance)  contact guard assist  -CS      Sitting Position, Reaches Outside Midline (Sitting, Dynamic Balance)  sitting on edge of bed  -CS      Recorded by [CS] Simi Rodriguez COTA/L 01/03/20 1548      Row Name 01/03/20 1325             Fine Motor Testing & Training    Fine Motor Tests  other (see comments) Large peg board. Pt had max difficulty with placing pegs.   -CS      Training Activity, Fine Motor Coordination  manipulation of pegs  -CS      Recorded by [CS] Simi Rodriguez COTA/L 01/03/20 1548      Row Name 01/03/20 1325             Positioning and Restraints    Pre-Treatment Position  in bed  -CS      Post Treatment Position  bed  -CS      In Bed  fowlers;call light within reach;encouraged to call for assist;exit alarm on  -CS      Recorded by [CS] Simi Rodriguez COTA/L 01/03/20 1548      Row Name 01/03/20 1325             Pain Scale: Numbers Pre/Post-Treatment    Pain Scale: Numbers, Pretreatment  0/10 - no pain  -CS      Pain Scale: Numbers, Post-Treatment  0/10 - no pain  -CS      Recorded by [CS] Simi Rodriguez COTA/L 01/03/20 1548      Row Name                Wound 01/01/20 1200 Left anterior toe Abrasion    Wound - Properties Group Date first assessed: 01/01/20 [AK] Time first assessed: 1200 [AK] Present on Hospital Admission: Y [AK] Side: Left [AK] Orientation: anterior [AK] Location: toe [AK], 2nd toe   Primary Wound Type: Abrasion [AK], toe nail removed traumatically   Recorded by:  [AK] Marisel Christensen RN 01/01/20 1356    Row Name 01/03/20 1325             Outcome Summary/Treatment Plan (OT)    Daily Summary of Progress (OT)  progress  toward functional goals is good  -CS      Plan for Continued Treatment (OT)  Cont OT POC  -CS      Anticipated Discharge Disposition (OT)  anticipate therapy at next level of care  -CS      Recorded by [CS] Simi Rodriguez COTA/L 01/03/20 1548        User Key  (r) = Recorded By, (t) = Taken By, (c) = Cosigned By    Initials Name Effective Dates Discipline    AK Marisel Christensen RN 10/17/16 -  Nurse    CS Simi Rodriguez COTA/NOLA 03/07/18 -  OT        Wound 01/01/20 1200 Left anterior toe Abrasion (Active)   Dressing Appearance open to air 1/3/2020  7:51 AM   Closure Open to air 1/3/2020  7:51 AM   Base maroon/purple 1/3/2020  7:51 AM   Periwound redness 1/3/2020  7:51 AM   [place]iwound Skin Turgor soft 1/3/2020  7:51 AM   Edges irregular 1/3/2020  7:51 AM   Drainage Amount none 1/3/2020  7:51 AM     Rehab Goal Summary     Row Name 01/03/20 1325 01/03/20 1017          Bed Mobility Goal 1 (PT)    Activity/Assistive Device (Bed Mobility Goal 1, PT)  --  sit to supine;supine to sit  -GB (r) MM (t) GB (c)     [place] Level/Cues Needed (Bed Mobility Goal 1, PT)  --  independent  -GB     Time Frame (Bed Mobility Goal 1, PT)  --  10 days  -GB     Progress/Outcomes (Bed Mobility Goal 1, PT)  --  goal not met  -GB        Transfer Goal 1 (PT)    Activity/Assistive Device (Transfer Goal 1, PT)  --  sit-to-stand/stand-to-sit;bed-to-chair/chair-to-bed;toilet  -GB (r) MM (t) GB (c)     [place] Level/Cues Needed (Transfer Goal 1, PT)  --  conditional independence  -GB     Time Frame (Transfer Goal 1, PT)  --  2 weeks  -GB     Progress/Outcome (Transfer Goal 1, PT)  --  goal not met  -GB (r) MM (t) GB (c)        Gait Training Goal 1 (PT)    Activity/Assistive Device (Gait Training Goal 1, PT)  --  assistive device use;gait (walking locomotion)  -GB (r) MM (t) GB (c)     [place] Level (Gait Training Goal 1, PT)  --  independent  -GB     Distance (Gait Goal 1, PT)  --  200 ft if  home w 24/7 caregiver, 1000 ft if home  as primary caregiver, w/ or w/out AD.   -GB     Time Frame (Gait Training Goal 1, PT)  --  long term goal (LTG);3 weeks  -GB     Progress/Outcome (Gait Training Goal 1, PT)  --  --  -GB        Balance Goal 1 (PT)    Activity/Assistive Device (Balance Goal 1, PT)  --  standing, static;standing, dynamic  -GB (r) MM (t) GB (c)     [place] Level/Cues Needed (Balance Goal 1, PT)  --  contact guard assist  -GB (r) MM (t) GB (c)     Time Frame (Balance Goal 1, PT)  --  5 days  -GB (r) MM (t) GB (c)     Progress/Outcomes (Balance Goal 1, PT)  --  goal not met;good progress toward goal  -GB (r) MM (t) GB (c)        Stairs Goal 1 (PT)    Activity/Assistive Device (Stairs Goal 1, PT)  --  ascending stairs;descending stairs  -GB     [place] Level/Cues Needed (Stairs Goal 1, PT)  --  conditional independence;independent  -GB     Number of Stairs (Stairs Goal 1, PT)  --  4  -GB     Time Frame (Stairs Goal 1, PT)  --  3 weeks  -GB     Progress/Outcome (Stairs Goal 1, PT)  --  goal not met  -GB        Patient Education Goal (PT)    Activity (Patient Education Goal, PT)  --  pt will demonstrate safety techniques in mobility and self care before considering d/c to home unless capable caregiver available on consistent basis  -GB     [place]/Cues/Accuracy (Memory Goal 2, PT)  --  demonstrates adequately  -GB     Time Frame (Patient Education Goal, PT)  --  long term goal (LTG);by discharge  -GB     Progress/Outcome (Patient Education Goal, PT)  --  goal not met  -GB        Occupational Therapy Goals    Transfer Goal Selection (OT)  transfer, OT goal 1  -CS  --     Bathing Goal Selection (OT)  bathing, OT goal 1  -CS  --     Dressing Goal Selection (OT)  dressing, OT goal 1  -CS  --     Toileting Goal Selection (OT)  toileting, OT goal 1  -CS  --     Grooming Goal Selection (OT)  grooming, OT goal 1  -CS  --     Self-Feeding Goal Selection (OT)  self feeding, OT goal 1  -CS  --     Functional Mobility Goal Selection (OT)   functional mobility, OT goal 1  -CS  --     Coordination Goal Selection (OT)  coordination, OT goal 1;coordination, OT goal 2  -CS  --        Transfer Goal 1 (OT)    Activity/Assistive Device (Transfer Goal 1, OT)  toilet  -CS  --     [place] Level/Cues Needed (Transfer Goal 1, OT)  standby assist  -CS  --     Time Frame (Transfer Goal 1, OT)  long term goal (LTG);by discharge  -CS  --     Progress/Outcome (Transfer Goal 1, OT)  goal not met  -CS  --        Bathing Goal 1 (OT)    Activity/Assistive Device (Bathing Goal 1, OT)  bathing skills, all  -CS  --     [place] Level/Cues Needed (Bathing Goal 1, OT)  set-up required;verbal cues required;standby assist  -CS  --     Time Frame (Bathing Goal 1, OT)  long term goal (LTG);by discharge  -CS  --     Progress/Outcomes (Bathing Goal 1, OT)  goal not met  -CS  --        Dressing Goal 1 (OT)    Activity/Assistive Device (Dressing Goal 1, OT)  dressing skills, all  -CS  --     [place]/Cues Needed (Dressing Goal 1, OT)  set-up required;verbal cues required;standby assist  -CS  --     Time Frame (Dressing Goal 1, OT)  long term goal (LTG);by discharge  -CS  --     Progress/Outcome (Dressing Goal 1, OT)  goal not met  -CS  --        Toileting Goal 1 (OT)    Activity/Device (Toileting Goal 1, OT)  toileting skills, all  -CS  --     [place] Level/Cues Needed (Toileting Goal 1, OT)  standby assist  -CS  --     Time Frame (Toileting Goal 1, OT)  long term goal (LTG);by discharge  -CS  --     Progress/Outcome (Toileting Goal 1, OT)  goal not met  -CS  --        Grooming Goal 1 (OT)    Activity/Device (Grooming Goal 1, OT)  grooming skills, all  -CS  --     [place] (Grooming Goal 1, OT)  set-up required;verbal cues required;standby assist  -CS  --     Time Frame (Grooming Goal 1, OT)  long term goal (LTG);by discharge  -CS  --     Progress/Outcome (Grooming Goal 1, OT)  goal not met  -CS  --        Self-Feeding Goal 1 (OT)    Activity/Assistive Device  (Self-Feeding Goal 1, OT)  self-feeding skills, all  -CS  --     [place] Level/Cues Needed (Self-Feeding Goal 1, OT)  set-up required;verbal cues required;standby assist  -CS  --     Time Frame (Self-Feeding Goal 1, OT)  long term goal (LTG);by discharge  -CS  --     Progress/Outcomes (Self-Feeding Goal 1, OT)  goal not met  -CS  --        Functional Mobility Goal 1 (OT)    Activity/Assistive Device (Functional Mobility Goal 1, OT)  -- AD as appropriate   -CS  --     [place] Level/Cues Needed (Functional Mobility Goal 1, OT)  standby assist  -CS  --     Distance Goal 1 (Functional Mobility, OT)  for ADL tasks no LOB and good safety  -CS  --     Time Frame (Functional Mobility Goal 1, OT)  long term goal (LTG);by discharge  -CS  --     Progress/Outcome (Functional Mobility Goal 1, OT)  goal not met  -CS  --        Coordination Goal 1 (OT)    Activity/Assistive Device (Coordination Goal 1, OT)  FM task BUE fair coordination   -CS  --     [place] Level/Cues Needed (Coordination Goal 1, OT)  set-up required;standby assist  -CS  --     Time Frame (Coordination Goal 1, OT)  long term goal (LTG);by discharge  -CS  --     Progress/Outcomes (Coordination Goal 1, OT)  goal not met  -CS  --        Coordination Goal 2 (OT)    Activity/Assistive Device (Coordination Goal 2, OT)  GM task BUE fair coordination   -CS  --     [place] Level/Cues Needed (Coordination Goal 2, OT)  standby assist;set-up required  -CS  --     Time Frame (Coordination Goal 2, OT)  long term goal (LTG);by discharge  -CS  --     Progress/Outcomes (Coordination Goal 2, OT)  goal not met  -CS  --       User Key  (r) = Recorded By, (t) = Taken By, (c) = Cosigned By    Initials Name Provider Type Discipline    Naz Mcgrath, PT Physical Therapist PT    CS Simi Rodriguez COTA/NOLA Occupational Therapy Assistant OT    MM Anjali Selby PT Student PT            OT Recommendation and Plan  Outcome Summary/Treatment Plan  (OT)  Daily Summary of Progress (OT): progress toward functional goals is good  Plan for Continued Treatment (OT): Cont OT POC  Anticipated Discharge Disposition (OT): anticipate therapy at next level of care  Therapy Frequency (OT Eval): daily  Daily Summary of Progress (OT): progress toward functional goals is good  Outcome Summary: Pt tolerated tx fair this date. Pt was cod I with bed mobility. Pt had max difficulty with placing large pegs in the board. Pt gave fair effort with UE ther ex. No goals met this tx. Continue OT POC.  Outcome Measures     Row Name 01/03/20 1500 01/02/20 1040 01/02/20 1033       How much help from another person do you currently need...    Turning from your back to your side while in flat bed without using bedrails?  --  3  -CORINNA  --    Moving from lying on back to sitting on the side of a flat bed without bedrails?  --  3  -CORINNA  --    Moving to and from a bed to a chair (including a wheelchair)?  --  3  -CORINNA  --    Standing up from a chair using your arms (e.g., wheelchair, bedside chair)?  --  3  -CORINNA  --    Climbing 3-5 steps with a railing?  --  2  -CORINNA  --    To walk in hospital room?  --  2  -CORINNA  --    AM-PAC 6 Clicks Score (PT)  --  16  -CORINNA  --       How much help from another is currently needed...    Putting on and taking off regular lower body clothing?  2  -CS  --  2  -BH    Bathing (including washing, rinsing, and drying)  2  -CS  --  2  -BH    Toileting (which includes using toilet bed pan or urinal)  2  -CS  --  2  -BH    Putting on and taking off regular upper body clothing  2  -CS  --  2  -BH    Taking care of personal grooming (such as brushing teeth)  2  -CS  --  2  -BH    Eating meals  2  -CS  --  2  -BH    AM-PAC 6 Clicks Score (OT)  12  -CS  --  12  -BH       Modified [place] Scale    Modified [place] Scale  --  4 - Moderately severe disability.  Unable to walk without assistance, and unable to attend to own bodily needs without assistance.  -CORINNA  4 - Moderately severe  disability.  Unable to walk without assistance, and unable to attend to own bodily needs without assistance.  -       Functional Assessment    Outcome Measure Options  --  AM-PAC 6 Clicks Basic Mobility (PT);Modified [place]  -  AM-PAC 6 Clicks Daily Activity (OT);Modified [place];Modified [place] Index  -      User Key  (r) = Recorded By, (t) = Taken By, (c) = Cosigned By    Initials Name Provider Type     Bruna Gentile, OTR/L Occupational Therapist    CORINNA Luci Macias, PT Physical Therapist    Simi Aviles COTA/NOLA Occupational Therapy Assistant           Time Calculation:   Time Calculation- OT     Row Name 01/03/20 1553             Time Calculation- OT    OT Start Time  1325  -CS      OT Stop Time  1425  -      OT Time Calculation (min)  60 min  -      Total Timed Code Minutes- OT  60 minute(s)  -      OT Received On  01/03/20  -        User Key  (r) = Recorded By, (t) = Taken By, (c) = Cosigned By    Initials Name Provider Type     Simi Rodriguez COTA/NOLA Occupational Therapy Assistant        Therapy Charges for Today     Code Description Service Date Service Provider Modifiers Qty    [id number]  OT THER PROC EA 15 MIN 1/3/2020 Simi Rodriguez COTA/L GO 2    [id number]  OT THERAPEUTIC ACT EA 15 MIN 1/3/2020 Simi Rodriguez COTA/L GO 2               ANTONIO Monterroso  1/3/2020

## 2020-01-03 NOTE — THERAPY PROGRESS REPORT/RE-CERT
Patient Name: Aubrey Montilla  : 1955    MRN: [id number]                              Today's Date: 1/3/2020     PT Progress/re-assessment  Admit Date: 2020    Visit Dx:     ICD-10-CM ICD-9-CM   1. Cerebrovascular accident (CVA), unspecified mechanism (CMS/HCC) I63.9 434.91   2. Symbolic dysfunction R48.9 784.60   3. Impaired mobility and ADLs Z74.09 799.89   4. Impaired functional mobility, balance, gait, and endurance Z74.09 V49.89     Patient Active Problem List   Diagnosis   • Cerebrovascular accident (CVA) (CMS/HCC)     Past Medical History:   Diagnosis Date   • Elevated cholesterol    • Hypertension      Past Surgical History:   Procedure Laterality Date   • ENDOSCOPY N/A 2017    Procedure: ESOPHAGOGASTRODUODENOSCOPY;  Surgeon: Van Khoury DO;  Location: [facility] ENDOSCOPY;  Service:    • ENDOSCOPY N/A 2019    Procedure: ESOPHAGOGASTRODUODENOSCOPY;  Surgeon: Van Khoury DO;  Location: [facility] ENDOSCOPY;  Service: Gastroenterology     General Information     Row Name 20 1017          PT Evaluation Time/Intention    Document Type  therapy note (daily note)  -GB     Mode of Treatment  individual therapy;physical therapy  -GB     Row Name 20 1017          General Information    Patient Profile Reviewed?  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/ she is post CA, had sx last wk on foot for heel spur ; currently alone & having difficulty per pt  -GB  spouse  -GB    Row Name 20 1017          Cognitive Assessment/Intervention- PT/OT    Orientation Status (Cognition)  oriented x 4  -GB (r) MM (t) GB (c)     Personal Safety Interventions  fall prevention  program maintained;gait belt;muscle strengthening facilitated;nonskid shoes/slippers when out of bed;supervised activity  -GB (r) MM (t) GB (c)     Row Name 01/03/20 1017          Safety Issues, Functional Mobility    Safety Issues Affecting Function (Mobility)  ability to follow commands;at risk behavior observed;awareness of need for assistance;impulsivity;insight into deficits/self awareness;safety precaution awareness  -GB (r) MM (t) GB (c)     Impairments Affecting Function (Mobility)  balance;cognition;coordination;endurance/activity tolerance;motor control;motor planning;muscle tone abnormal;postural/trunk control;visual/perceptual  -GB     Comment, Safety Issues/Impairments (Mobility)  PT appears unsafe in self care due to difficulty reaching & operating safety latch door handle indep, inability to keep control and hold of FWRW despite cues, ataxic type gait, inabiltiy to percieve his safety risks during gait, high fall risk, episodes of being unsafe such as pulling IV when describing it pulled out yesterday and unaware of his physical limitations feeling he can go home to care for wife.   -GB       User Key  (r) = Recorded By, (t) = Taken By, (c) = Cosigned By    Initials Name Provider Type    Naz Mcgrath, PT Physical Therapist    Anjali Waller PT Student        Mobility     Row Name 01/03/20 1017          Bed Mobility Assessment/Treatment    Bed Mobility Assessment/Treatment  bed mobility (all) activities  -GB     [place] Level (Bed Mobility)  conditional independence  -GB     Assistive Device (Bed Mobility)  bed rails;head of bed elevated  -GB     Comment (Bed Mobility)  used hand rail briefly in mcdonald immediately;  -GB     Row Name 01/03/20 1017          Gait/Stairs Assessment/Training    Gait/Stairs Assessment/Training  gait/ambulation assistive device;distance ambulated;gait pattern;gait deviations  -GB (r) MM (t) GB (c)     [place] Level (Gait)  minimum assist (75%  patient effort);moderate assist (50% patient effort);2 person assist  -GB     Assistive Device (Gait)  walker, front-wheeled  -GB (r) MM (t) GB (c)     Distance in Feet (Gait)  150ft  -GB (r) MM (t) GB (c)     Deviations/Abnormal Patterns (Gait)  base of support, narrow;gait speed decreased;stride length decreased;ataxic  -GB     Bilateral Gait Deviations  heel strike decreased;knee buckling, right side;leans right;forward flexed posture  -GB (r) MM (t) GB (c)     Comment (Gait/Stairs)  Pt drifts towards the R while walking, > 2 LOBs with PT assist x2 to prevent fall. Pt is unsteady with gait and requires use of AD to ambulate.  Pt required mod of 1 for keeping L hand on RW, holding pt upright on L side, and ataxic type movement of trunk. Very unsteady and not safe, as well as unaware of how severe his gait unsteadinness presents. Very high fall risk requiring 2 for diatance gait.  -GB       User Key  (r) = Recorded By, (t) = Taken By, (c) = Cosigned By    Initials Name Provider Type    Naz Mcgrath, PT Physical Therapist    Anjali Waller PT Student        Obj/Interventions     Row Name 01/03/20 1017          MMT (Manual Muscle Testing)    General MMT Comments  toe raymond test 16/30 sec; needed cuing and repeat e  -GB     Row Name 01/03/20 1017          Static Sitting Balance    Level of [place] (Unsupported Sitting, Static Balance)  contact guard assist Pt leans towards right side with R elbow resting on the bed. Able to csit upright when asked but eventually drifts back to R lean.  -GB (r) MM (t) GB (c)     Sitting Position (Unsupported Sitting, Static Balance)  sitting on edge of bed  -GB (r) MM (t) GB (c)     Time Able to Maintain Position (Unsupported Sitting, Static Balance)  more than 5 minutes  -GB (r) MM (t) GB (c)       User Key  (r) = Recorded By, (t) = Taken By, (c) = Cosigned By    Initials Name Provider Type    Naz Mcgrath, PT Physical Therapist    CASSANDRA Selby  Anjali PT Student        Goals/Plan     Row Name 01/03/20 1017          Bed Mobility Goal 1 (PT)    Activity/Assistive Device (Bed Mobility Goal 1, PT)  sit to supine;supine to sit  -GB (r) MM (t) GB (c)     [place] Level/Cues Needed (Bed Mobility Goal 1, PT)  independent  -GB     Time Frame (Bed Mobility Goal 1, PT)  10 days  -GB     Progress/Outcomes (Bed Mobility Goal 1, PT)  goal not met  -GB     Row Name 01/03/20 1017          Transfer Goal 1 (PT)    Activity/Assistive Device (Transfer Goal 1, PT)  sit-to-stand/stand-to-sit;bed-to-chair/chair-to-bed;toilet  -GB (r) MM (t) GB (c)     [place] Level/Cues Needed (Transfer Goal 1, PT)  conditional independence  -GB     Time Frame (Transfer Goal 1, PT)  2 weeks  -GB     Progress/Outcome (Transfer Goal 1, PT)  goal not met  -GB (r) MM (t) GB (c)     Row Name 01/03/20 1017          Gait Training Goal 1 (PT)    Activity/Assistive Device (Gait Training Goal 1, PT)  assistive device use;gait (walking locomotion)  -GB (r) MM (t) GB (c)     [place] Level (Gait Training Goal 1, PT)  independent  -GB     Distance (Gait Goal 1, PT)  200 ft if  home w 24/7 caregiver, 1000 ft if home as primary caregiver, w/ or w/out AD.   -GB     Time Frame (Gait Training Goal 1, PT)  long term goal (LTG);3 weeks  -GB     Progress/Outcome (Gait Training Goal 1, PT)  --  -GB     Row Name 01/03/20 1017          Balance Goal 1 (PT)    Activity/Assistive Device (Balance Goal 1, PT)  standing, static;standing, dynamic  -GB (r) MM (t) GB (c)     [place] Level/Cues Needed (Balance Goal 1, PT)  contact guard assist  -GB (r) MM (t) GB (c)     Time Frame (Balance Goal 1, PT)  5 days  -GB (r) MM (t) GB (c)     Progress/Outcomes (Balance Goal 1, PT)  goal not met;good progress toward goal  -GB (r) MM (t) GB (c)     Row Name 01/03/20 1017          Stairs Goal 1 (PT)    Activity/Assistive Device (Stairs Goal 1, PT)  ascending stairs;descending stairs  -GB     [place] Level/Cues  Needed (Stairs Goal 1, PT)  conditional independence;independent  -GB     Number of Stairs (Stairs Goal 1, PT)  4  -GB     Time Frame (Stairs Goal 1, PT)  3 weeks  -GB     Progress/Outcome (Stairs Goal 1, PT)  goal not met  -GB     Row Name 01/03/20 1017          Patient Education Goal (PT)    Activity (Patient Education Goal, PT)  pt will demonstrate safety techniques in mobility and self care before considering d/c to home unless capable caregiver available on consistent basis  -GB     [place]/Cues/Accuracy (Memory Goal 2, PT)  demonstrates adequately  -GB     Time Frame (Patient Education Goal, PT)  long term goal (LTG);by discharge  -GB     Progress/Outcome (Patient Education Goal, PT)  goal not met  -GB       User Key  (r) = Recorded By, (t) = Taken By, (c) = Cosigned By    Initials Name Provider Type    GB Naz Bauer, PT Physical Therapist    Anjali Waller PT Student        Clinical Impression     Row Name 01/03/20 1017          Pain Scale: Numbers Pre/Post-Treatment    Pain Scale: Numbers, Pretreatment  0/10 - no pain  -GB     Pain Scale: Numbers, Post-Treatment  0/10 - no pain  -GB     Row Name 01/03/20 1017          Plan of Care Review    Plan of Care Reviewed With  patient  -GB     Outcome Summary  PT treatment completed. Pt was in bed upon arrival. When asked to perform toe taps, he completed 16 in 30 sec and was unable to maintain rhythem and speed with multiple verbal and visual cuing. Pt was able to ambulate 150ft with CGA-Joseph on R & mod assist of 1 on L using FWRW. He had at > 2 LOBs requiring min-mod assistance x2 to prevent fall. His gait is unsteady and he drifts towards the right and has difficulty holding on to FWRW with L hand. Pt appears to be unaware of safety concerns and near falls. PT will continue to follow to address coordination, balance, and gait. Pt does not show signs of immediate improvement sufficient to allow him home safely as primary caregiver and will  need caregiver himself. May be best if he and wife can have placement?/ possible care together in an SNF/rehab to home type environment before resuming caregiving at home for each other.   -GB     Row Name 01/03/20 1017          Physical Therapy Clinical Impression    Criteria for Skilled Interventions Met (PT Clinical Impression)  yes  -GB     Row Name 01/03/20 1017          Vital Signs    Pre Systolic BP Rehab  93  -GB     Pre Treatment Diastolic BP  52  -GB     Intra Systolic BP Rehab  111  -GB     Intra Treatment Diastolic BP  62  -GB     Post Systolic BP Rehab  116  -GB     Post Treatment Diastolic BP  52  -GB     Pretreatment Heart Rate (beats/min)  77  -GB     Intratreatment Heart Rate (beats/min)  81  -GB     Posttreatment Heart Rate (beats/min)  74  -GB     Pre SpO2 (%)  95  -GB     O2 Delivery Pre Treatment  room air  -GB     Intra SpO2 (%)  98  -GB     O2 Delivery Intra Treatment  room air  -GB     Post SpO2 (%)  97  -GB     Pre Patient Position  Supine  -GB     Intra Patient Position  Standing  -GB     Post Patient Position  Supine  -GB     Recovery Time  after return to supine   -GB     Row Name 01/03/20 1017          Positioning and Restraints    Pre-Treatment Position  in bed  -GB     Post Treatment Position  bed  -GB     In Bed  notified nsg;fowlers;call light within reach;exit alarm on;encouraged to call for assist  -GB       User Key  (r) = Recorded By, (t) = Taken By, (c) = Cosigned By    Initials Name Provider Type    Naz Mcgrath, PT Physical Therapist        Outcome Measures     Row Name 01/03/20 1017          How much help from another person do you currently need...    Turning from your back to your side while in flat bed without using bedrails?  3  -GB (r) MM (t) GB (c)     Moving from lying on back to sitting on the side of a flat bed without bedrails?  3  -GB (r) MM (t) GB (c)     Moving to and from a bed to a chair (including a wheelchair)?  3  -GB (r) MM (t) GB (c)      Standing up from a chair using your arms (e.g., wheelchair, bedside chair)?  3  -GB (r) MM (t) GB (c)     Climbing 3-5 steps with a railing?  2  -GB (r) MM (t) GB (c)     To walk in hospital room?  2  -GB (r) MM (t) GB (c)     AM-PAC 6 Clicks Score (PT)  16  -GB (r) MM (t)     Row Name 01/03/20 1017          Modified Jordan Scale    Modified Jordan Scale  4 - Moderately severe disability.  Unable to walk without assistance, and unable to attend to own bodily needs without assistance.  -GB (r) MM (t) GB (c)     Row Name 01/03/20 1017          Functional Assessment    Outcome Measure Options  AM-PAC 6 Clicks Basic Mobility (PT)  -GB (r) MM (t) GB (c)       User Key  (r) = Recorded By, (t) = Taken By, (c) = Cosigned By    Initials Name Provider Type    Naz Mcgrath, PT Physical Therapist    Anjali Waller PT Student          PT Recommendation and Plan     Plan of Care Reviewed With: patient  Outcome Summary: PT treatment completed. Pt was in bed upon arrival. When asked to perform toe taps, he completed 16 in 30 sec and was unable to maintain rhythem and speed with multiple verbal and visual cuing. Pt was able to ambulate 150ft with CGA-Joseph on R & mod assist of 1 on L using FWRW. He had at > 2 LOBs requiring min-mod assistance x2 to prevent fall. His gait is unsteady and he drifts towards the right and has difficulty holding on to FWRW with L hand. Pt appears to be unaware of safety concerns and near falls. PT will continue to follow to address coordination, balance, and gait. 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 May be best if he and wife can have placement?/ possible care together in an SNF/rehab to home type environment before resuming caregiving at home for each other.      Time Calculation:   PT Charges     Row Name 01/03/20 1017             Time Calculation    Start Time  1017  -GB      Stop  Time  1050  -GB      Time Calculation (min)  33 min  -GB      PT Received On  01/03/20  -GB      PT Goal Re-Cert Due Date  01/15/20  -GB        User Key  (r) = Recorded By, (t) = Taken By, (c) = Cosigned By    Initials Name Provider Type    Naz Mcgrath, PT Physical Therapist        Therapy Charges for Today     Code Description Service Date Service Provider Modifiers Qty    61938363905 HC PT THERAPEUTIC ACT EA 15 MIN 1/3/2020 Naz Bauer, PT GP 2          PT G-Codes  Outcome Measure Options: AM-PAC 6 Clicks Basic Mobility (PT)  AM-PAC 6 Clicks Score (PT): 16  AM-PAC 6 Clicks Score (OT): 12  Modified Kumar Scale: 4 - Moderately severe disability.  Unable to walk without assistance, and unable to attend to own bodily needs without assistance.    Naz Bauer, SILVIA  1/3/2020

## 2020-01-03 NOTE — PROGRESS NOTES
[facility] HOSPITALIST PROGRESS NOTE     Patient Identification:  Name:  Aubrey Montilla  Age:  64 y.o.  Sex:  male  :  1955  MRN:  [id number]  Visit Number:  [id number]  Primary Care Provider:  Lorenzo Mondragon MD    Length of stay:  1    Chief complaint: Improving clinically although still requiring assistance to ambulate.    Subjective:     64 y.o. male with known history of hypertension, coronary artery disease previous CVA who presents today emergency room complaining of right-sided facial numbness and weakness of the left upper and lower extremities all of 1 day duration.  Patient said that his legs were so weak that he fell down.  On presentation to the emergency room patient had a CT of the brain which showed he has an acute infarct.  Tele - neurology was consulted.  The distribution of the stroke is suspicious for embolic phenomena.  Echo was done as well as STACY.  CTA did not show any stenosis.  Patient has been started on PT/OT.  Recommendation is acute inpatient rehab.  Patient on review admits to 21 pound weight loss.  Had recent colonoscopy which was negative.  Also further questioning showed that patient was taking aspirin and Plavix on alternate days.  Neurology recommendation is daily aspirin and Plavix    Patient is seen today as a follow-up.  Participating in physical therapy but requiring support and PT reported unsteady gait.  ----------------------------------------------------------------------------------------------------------------------  Current Hospital Meds:    aspirin 81 mg Oral Daily   Or      aspirin 300 mg Rectal Daily   [START ON 2020] atenolol 25 mg Oral Daily   atorvastatin 40 mg Oral Nightly   budesonide-formoterol 2 puff Inhalation BID - RT   clopidogrel 75 mg Oral Daily   docusate sodium 100 mg Oral Daily   gabapentin 200 mg Oral TID   sertraline 100 mg Oral Daily   sodium chloride 10 mL Intravenous Q12H         ----------------------------------------------------------------------------------------------------------------------  Vital Signs:  Temp:  [97.4 °F (36.3 °C)-98.3 °F (36.8 °C)] 97.4 °F (36.3 °C)  Heart Rate:  [66-97] 74  Resp:  [14-16] 16  BP: ()/(56-85) 125/59      01/01/20  1146 01/02/20  0526 01/03/20  0347   Weight: 61.8 kg (136 lb 3.2 oz) 61.5 kg (135 lb 8 oz) 62.1 kg (136 lb 12.8 oz)     Body mass index is 18.05 kg/m².    Intake/Output Summary (Last 24 hours) at 1/3/2020 1138  Last data filed at 1/3/2020 1043  Gross per 24 hour   Intake 2858 ml   Output 1000 ml   Net 1858 ml     Diet Regular; Cardiac, Consistent Carbohydrate  ----------------------------------------------------------------------------------------------------------------------  Physical exam:  Constitutional: Middle aged man.  No respiratory distress.      HENT:  Head: Normocephalic and atraumatic.  Mouth:  Moist mucous membranes.    Eyes:  Conjunctivae and EOM are normal.  Pupils are equal, round, and reactive to light.  No scleral icterus.  Neck:  Neck supple.  No JVD present.    Cardiovascular:  Normal rate, regular rhythm and normal heart sounds with no murmur.  Pulmonary/Chest:  No respiratory distress, no wheezes, no crackles, with normal breath sounds and good air movement.  Abdominal:  Soft.  Bowel sounds are normal.  No distension and no tenderness.   Musculoskeletal:  No edema, no tenderness, and no deformity.  No red or swollen joints anywhere.  Muscle wasting especially in the small fingers and thenar eminence  Neurological:  Alert and oriented to person, place, and time.  No cranial nerve deficit.  No tongue deviation.  No facial droop.  No slurred speech.  Mild weakness of the left .  Skin:  Skin is warm and dry.  No rash noted.  No pallor.   Psychiatric:  Normal mood and affect.  Behavior is normal.  Judgment and thought content normal.   Peripheral vascular:  No edema and strong pulses on all 4  extremities.  Genitourinary: Deferred  ----------------------------------------------------------------------------------------------------------------------  Tele:    ----------------------------------------------------------------------------------------------------------------------  Results from last 7 days   Lab Units 01/01/20  0859   TROPONIN T ng/mL <0.010     Results from last 7 days   Lab Units 01/02/20  0527 01/01/20  0859   WBC 10*3/mm3 12.80* 21.16*   HEMOGLOBIN g/dL 10.8* 12.7*   HEMATOCRIT % 32.9* 38.9   MCV fL 88.4 90.5   MCHC g/dL 32.8 32.6   PLATELETS 10*3/mm3 278 366   INR   --  1.19         Results from last 7 days   Lab Units 01/01/20  0859   SODIUM mmol/L 138   POTASSIUM mmol/L 4.0   CHLORIDE mmol/L 97*   CO2 mmol/L 26.0   BUN mg/dL 16   CREATININE mg/dL 1.33*   EGFR IF NONAFRICN AM mL/min/1.73 54*   CALCIUM mg/dL 9.4   GLUCOSE mg/dL 95   ALBUMIN g/dL 4.10   BILIRUBIN mg/dL 0.6   ALK PHOS U/L 102   AST (SGOT) U/L 13   ALT (SGPT) U/L 7   Estimated Creatinine Clearance: 49.3 mL/min (A) (by C-G formula based on SCr of 1.33 mg/dL (H)).    No results found for: AMMONIA  Results from last 7 days   Lab Units 01/02/20  0527   CHOLESTEROL mg/dL 119   TRIGLYCERIDES mg/dL 61   HDL CHOL mg/dL 41   LDL CHOL mg/dL 66     Blood Culture   Date Value Ref Range Status   01/01/2020 No growth at 24 hours  Preliminary   01/01/2020 No growth at 2 days  Preliminary     No results found for: URINECX  No results found for: WOUNDCX  No results found for: STOOLCX    I have personally looked at the labs and they are summarized above.  ----------------------------------------------------------------------------------------------------------------------  Imaging Results (Last 24 Hours)     ** No results found for the last 24 hours. **        ----------------------------------------------------------------------------------------------------------------------  Assessment:  Acute frontal lobe infarct  Leukocytosis probably due  to bronchitis  Coronary artery disease  Peripheral vascular disease  Hypertension  Unintentional weight loss with BMI of 17.88  Normochromic anemia     Plan:  Patient will require outpatient work-up for unintentional weight loss.  Continue patient on aspirin, Plavix and atorvastatin.  We will continue on p.o. doxycycline.  Continue on atenolol, Zoloft and neb  treatment.  Continue with PT/OT evaluation and management.     Prophylaxis:  DVT-early ambulation/education.     Disposition:  Acute rehab however was not approved  Patient will require SNF placement.   on board.  Plan of care was discussed with the patient  Appreciate the input of neurology    Pawel Pratt MD  01/03/20  11:38 AM     Dragon disclaimer:  Much of this encounter note is an electronic transcription/translation of spoken language to printed text. The electronic translation of spoken language may permit erroneous, or at times, nonsensical words or phrases to be inadvertently transcribed; Although I have reviewed the note for such errors, some may still exist.

## 2020-01-03 NOTE — DISCHARGE PLACEMENT REQUEST
"Aubrey Wilcox (64 y.o. Male)     Date of Birth Social Security Number Address Home Phone MRN    1955  [address] [phone] [id number]    [employer] Marital Status          Christianity        Admission Date Admission Type Admitting Provider Attending Provider Department, Room/Bed    1/1/20 Emergency Pawel Pratt MD [place], Emmanuel [place], MD [facility] STEPDOWN UNIT, 316/1    Discharge Date Discharge Disposition Discharge Destination                       Attending Provider:  Pawel Pratt MD    Allergies:  Contrast Dye    Isolation:  None   Infection:  None   Code Status:  CPR    Ht:  185.4 cm (73\")   Wt:  62.1 kg (136 lb 12.8 oz)    Admission Cmt:  None   Principal Problem:  None                Active Insurance as of 1/1/2020     Primary Coverage     Payor Plan Insurance Group Employer/Plan Group    [employer] [place] [id number]     Payor Plan Address Payor Plan Phone Number Payor Plan Fax Number Effective Dates    [employer] 04 [phone]  1/1/2018 - None Entered    [address] [id number]       Subscriber Name Subscriber Birth Date Member ID       AUBREY WILCOX 1955 [id number]                 Emergency Contacts      (Rel.) Home Phone Work Phone Mobile Phone    Billie Wilcox (Spouse) [phone] -- [phone]            Emergency Contact Information     Name Relation Home Work Mobile    Billie Wilcox Spouse [phone]          Insurance Information                [employer] [employer] 111 Phone: [phone]    Subscriber: Aubrey Wilcox Subscriber#: [id number]    Group#: [id number] Precert#:           "

## 2020-01-04 PROBLEM — I10 HYPERTENSION: Status: ACTIVE | Noted: 2020-01-04

## 2020-01-04 PROBLEM — E78.00 ELEVATED CHOLESTEROL: Status: ACTIVE | Noted: 2020-01-04

## 2020-01-04 LAB
BASOPHILS # BLD AUTO: 0.04 10*3/MM3 (ref 0–0.2)
BASOPHILS NFR BLD AUTO: 0.3 % (ref 0–1.5)
DEPRECATED RDW RBC AUTO: 41.3 FL (ref 37–54)
EOSINOPHIL # BLD AUTO: 0.2 10*3/MM3 (ref 0–0.4)
EOSINOPHIL NFR BLD AUTO: 1.6 % (ref 0.3–6.2)
ERYTHROCYTE [DISTWIDTH] IN BLOOD BY AUTOMATED COUNT: 12.7 % (ref 12.3–15.4)
HCT VFR BLD AUTO: 32.4 % (ref 37.5–51)
HGB BLD-MCNC: 10.5 G/DL (ref 13–17.7)
HOLD SPECIMEN: NORMAL
IMM GRANULOCYTES # BLD AUTO: 0.06 10*3/MM3 (ref 0–0.05)
IMM GRANULOCYTES NFR BLD AUTO: 0.5 % (ref 0–0.5)
LYMPHOCYTES # BLD AUTO: 1.05 10*3/MM3 (ref 0.7–3.1)
LYMPHOCYTES NFR BLD AUTO: 8.2 % (ref 19.6–45.3)
MCH RBC QN AUTO: 29.1 PG (ref 26.6–33)
MCHC RBC AUTO-ENTMCNC: 32.4 G/DL (ref 31.5–35.7)
MCV RBC AUTO: 89.8 FL (ref 79–97)
MONOCYTES # BLD AUTO: 1.11 10*3/MM3 (ref 0.1–0.9)
MONOCYTES NFR BLD AUTO: 8.6 % (ref 5–12)
NEUTROPHILS # BLD AUTO: 10.41 10*3/MM3 (ref 1.7–7)
NEUTROPHILS NFR BLD AUTO: 80.8 % (ref 42.7–76)
NRBC BLD AUTO-RTO: 0 /100 WBC (ref 0–0.2)
PLATELET # BLD AUTO: 299 10*3/MM3 (ref 140–450)
PMV BLD AUTO: 10.8 FL (ref 6–12)
RBC # BLD AUTO: 3.61 10*6/MM3 (ref 4.14–5.8)
WBC NRBC COR # BLD: 12.87 10*3/MM3 (ref 3.4–10.8)

## 2020-01-04 PROCEDURE — 94799 UNLISTED PULMONARY SVC/PX: CPT

## 2020-01-04 PROCEDURE — 85025 COMPLETE CBC W/AUTO DIFF WBC: CPT | Performed by: INTERNAL MEDICINE

## 2020-01-04 PROCEDURE — 97530 THERAPEUTIC ACTIVITIES: CPT

## 2020-01-04 RX ADMIN — DOXYCYCLINE 100 MG: 100 CAPSULE ORAL at 08:57

## 2020-01-04 RX ADMIN — BUDESONIDE AND FORMOTEROL FUMARATE DIHYDRATE 2 PUFF: 160; 4.5 AEROSOL RESPIRATORY (INHALATION) at 19:41

## 2020-01-04 RX ADMIN — DOCUSATE SODIUM 100 MG: 100 CAPSULE, LIQUID FILLED ORAL at 08:57

## 2020-01-04 RX ADMIN — ATORVASTATIN CALCIUM 40 MG: 40 TABLET, FILM COATED ORAL at 20:22

## 2020-01-04 RX ADMIN — BUDESONIDE AND FORMOTEROL FUMARATE DIHYDRATE 2 PUFF: 160; 4.5 AEROSOL RESPIRATORY (INHALATION) at 07:54

## 2020-01-04 RX ADMIN — CLOPIDOGREL BISULFATE 75 MG: 75 TABLET ORAL at 08:57

## 2020-01-04 RX ADMIN — SODIUM CHLORIDE, PRESERVATIVE FREE 10 ML: 5 INJECTION INTRAVENOUS at 08:58

## 2020-01-04 RX ADMIN — GABAPENTIN 200 MG: 100 CAPSULE ORAL at 17:41

## 2020-01-04 RX ADMIN — SERTRALINE HYDROCHLORIDE 100 MG: 50 TABLET ORAL at 08:57

## 2020-01-04 RX ADMIN — ATENOLOL 25 MG: 25 TABLET ORAL at 08:57

## 2020-01-04 RX ADMIN — SODIUM CHLORIDE, PRESERVATIVE FREE 10 ML: 5 INJECTION INTRAVENOUS at 20:22

## 2020-01-04 RX ADMIN — DOXYCYCLINE 100 MG: 100 CAPSULE ORAL at 20:22

## 2020-01-04 RX ADMIN — ASPIRIN 81 MG 81 MG: 81 TABLET ORAL at 08:57

## 2020-01-04 RX ADMIN — GABAPENTIN 200 MG: 100 CAPSULE ORAL at 08:57

## 2020-01-04 RX ADMIN — GABAPENTIN 200 MG: 100 CAPSULE ORAL at 20:22

## 2020-01-04 NOTE — PROGRESS NOTES
[facility] Medicine Services  INPATIENT PROGRESS NOTE    Length of Stay: 2  Date of Admission: 1/1/2020  Primary Care Physician: Lorenzo Mondragon MD    Subjective   Chief Complaint: Left-sided weakness  HPI: 64-year-old  male with past medical history of hypertension, coronary artery disease, previous CVA who presented on 1/1/2020 with complaints of right-sided facial numbness and weakness down the left side of his body.  Patient is currently admitted related to acute frontal lobe infarct.  The patient has been followed by tele-neurology service and has been recommended aspirin, Plavix, statin, PT, OT.  He is currently hospitalized awaiting SNF placement for further rehab.  During today's visit, the patient denies any complaints.    Review of Systems   Constitutional: Negative for chills and fever.   Respiratory: Negative for cough, chest tightness, shortness of breath and wheezing.    Cardiovascular: Negative for chest pain, palpitations and leg swelling.   Gastrointestinal: Negative for abdominal pain, diarrhea, nausea and vomiting.   Musculoskeletal: Negative for back pain and neck pain.   Skin: Negative for pallor.   Neurological: Negative for dizziness and weakness.   Psychiatric/Behavioral: Negative for confusion. The patient is not nervous/anxious.         All pertinent negatives and positives are as above. All other systems have been reviewed and are negative unless otherwise stated.     Objective    Temp:  [96.4 °F (35.8 °C)-98.2 °F (36.8 °C)] 96.8 °F (36 °C)  Heart Rate:  [67-80] 75  Resp:  [16-18] 18  BP: (108-131)/(50-73) 110/73    Physical Exam   Constitutional: He is oriented to person, place, and time. He appears well-developed and well-nourished. No distress.   HENT:   Head: Normocephalic and atraumatic.   Right Ear: External ear normal.   Left Ear: External ear normal.   Nose: Nose normal.   Eyes: Conjunctivae are normal. Right eye exhibits no  discharge. Left eye exhibits no discharge. No scleral icterus.   Neck: Normal range of motion. Neck supple. No JVD present.   Cardiovascular: Normal rate, regular rhythm, normal heart sounds and intact distal pulses. Exam reveals no gallop and no friction rub.   No murmur heard.  Pulmonary/Chest: Effort normal and breath sounds normal. No stridor. No respiratory distress. He has no wheezes. He has no rales.   Abdominal: Soft. Bowel sounds are normal. He exhibits no distension. There is no tenderness.   Musculoskeletal: Normal range of motion. He exhibits no edema.   Neurological: He is alert and oriented to person, place, and time.   Skin: Skin is warm and dry. He is not diaphoretic.   Psychiatric: He has a normal mood and affect. His behavior is normal.   Nursing note and vitals reviewed.          Results Review:  I have reviewed the labs, radiology results, and diagnostic studies.    Laboratory Data:   Results from last 7 days   Lab Units 01/01/20  0859   SODIUM mmol/L 138   POTASSIUM mmol/L 4.0   CHLORIDE mmol/L 97*   CO2 mmol/L 26.0   BUN mg/dL 16   CREATININE mg/dL 1.33*   GLUCOSE mg/dL 95   CALCIUM mg/dL 9.4   BILIRUBIN mg/dL 0.6   ALK PHOS U/L 102   ALT (SGPT) U/L 7   AST (SGOT) U/L 13   ANION GAP mmol/L 15.0     Estimated Creatinine Clearance: 49.5 mL/min (A) (by C-G formula based on SCr of 1.33 mg/dL (H)).          Results from last 7 days   Lab Units 01/04/20  0722 01/02/20  0527 01/01/20  0859   WBC 10*3/mm3 12.87* 12.80* 21.16*   HEMOGLOBIN g/dL 10.5* 10.8* 12.7*   HEMATOCRIT % 32.4* 32.9* 38.9   PLATELETS 10*3/mm3 299 278 366     Results from last 7 days   Lab Units 01/01/20  0859   INR  1.19       Culture Data:   No results found for: BLOODCX  No results found for: URINECX  No results found for: RESPCX  No results found for: WOUNDCX  No results found for: STOOLCX  No components found for: BODYFLD    Radiology Data:   Imaging Results (Last 24 Hours)     ** No results found for the last 24 hours. **           I have reviewed the patient's current medications.     Assessment/Plan     Active Hospital Problems    Diagnosis   • **Cerebrovascular accident (CVA) (CMS/[facility])   • Hypertension   • Elevated cholesterol       Plan:    1.  Acute frontal lobe infarct: Continue PT, OT.  Continue aspirin, Plavix, statin.  Awaiting placement at skilled nursing facility for further rehabilitation.  Continue atenolol for blood pressure management.  2.  Hypertension: Continue atenolol  3.  Hyperlipidemia: Continue statin.      Discharge Planning: Patient accepted to [facility] on Monday, 1/6/2020.          This document has been electronically signed by RENALDO Chase on January 4, 2020 2:06 PM

## 2020-01-04 NOTE — THERAPY TREATMENT NOTE
Acute Care - Occupational Therapy Treatment Note  [facility]     Patient Name: Aubrey Montilla  : 1955  MRN: [id number]  Today's Date: 2020  Onset of Illness/Injury or Date of Surgery: 20  Date of Referral to OT: 20  Referring Physician: Dr. Pratt    Admit Date: 2020       ICD-10-CM ICD-9-CM   1. Cerebrovascular accident (CVA), unspecified mechanism (CMS/HCC) I63.9 434.91   2. Symbolic dysfunction R48.9 784.60   3. Impaired mobility and ADLs Z74.09 799.89   4. Impaired functional mobility, balance, gait, and endurance Z74.09 V49.89     Patient Active Problem List   Diagnosis   • Cerebrovascular accident (CVA) (CMS/HCC)     Past Medical History:   Diagnosis Date   • Elevated cholesterol    • Hypertension      Past Surgical History:   Procedure Laterality Date   • ENDOSCOPY N/A 2017    Procedure: ESOPHAGOGASTRODUODENOSCOPY;  Surgeon: Van Khoury DO;  Location: [facility] ENDOSCOPY;  Service:    • ENDOSCOPY N/A 2019    Procedure: ESOPHAGOGASTRODUODENOSCOPY;  Surgeon: Van Khoury DO;  Location: [facility] ENDOSCOPY;  Service: Gastroenterology     Non-skid socks and gait belt in place. Toileting offered. Call light and needs within reach. Pt advised to not get up alone and call the nurse for assistance.  Bed alarm on.   Therapy Treatment    Rehabilitation Treatment Summary     Row Name 20 0857             Treatment Time/Intention    Discipline  occupational therapy assistant  -SUNIL      Document Type  therapy note (daily note)  -SUNIL      Subjective Information  no complaints  -SUNIL      Mode of Treatment  individual therapy;occupational therapy  -SUNIL      Patient/Family Observations  no family present  -SUNIL      Care Plan Review  care plan/treatment goals reviewed;current/potential barriers reviewed;patient/other agree to care plan  -SUNIL      Total Minutes, Occupational Therapy Treatment  53  -SUNIL      Therapy Frequency (OT Eval)  daily  -SUNIL      Patient Effort  good   -SUNIL      Existing Precautions/Restrictions  fall  -SUNIL      Recorded by [SUNIL] Genet Youngblood OTA 01/04/20 1224      Row Name 01/04/20 0857             Vital Signs    Pre Systolic BP Rehab  119  -SUNIL      Pre Treatment Diastolic BP  55  -SUINL      Post Systolic BP Rehab  123  -SUNIL      Post Treatment Diastolic BP  60  -SUNIL      Pretreatment Resp Rate (breaths/min)  99  -SUNIL      Posttreatment Resp Rate (breaths/min)  96  -SUNIL      Pre SpO2 (%)  99  -SUNIL      O2 Delivery Pre Treatment  room air  -SUNIL      Post SpO2 (%)  96  -SUNIL      O2 Delivery Post Treatment  room air  -SUNIL      Recorded by [SUNIL] Genet Youngblood OTA 01/04/20 1224      Row Name 01/04/20 0857             Cognitive Assessment/Intervention- PT/OT    Affect/Mental Status (Cognitive)  WFL  -SUNIL      Orientation Status (Cognition)  oriented x 4  -SUNIL      Follows Commands (Cognition)  follows two step commands;75-90% accuracy  -SUNIL      Safety Deficit (Cognitive)  mild deficit  -SUNIL      Personal Safety Interventions  supervised activity  -SUNIL      Recorded by [SUNIL] Genet Youngblood OTA 01/04/20 1224      Row Name 01/04/20 0857             Motor Skills Assessment/Interventions    Additional Documentation  Fine Motor Testing & Training (Group)  -SUNIL      Recorded by [SUNIL] Genet Youngblood OTA 01/04/20 1224      Row Name 01/04/20 0857             Fine Motor Testing & Training    Training Activity, Fine Motor Coordination  manipulation of pegs  -SUNIL      Comment, Fine Motor Coordination  Pt stated that he was only able to put one peg in the hole during evaluation. completed 25 peg placements on this day.  -SUNIL      Recorded by [SUNIL] Genet Youngblood OTA 01/04/20 1224      Row Name 01/04/20 0857             Positioning and Restraints    Pre-Treatment Position  in bed  -SUNIL      Post Treatment Position  bed  -SUNIL      In Bed  call light within reach;encouraged to call for assist;exit alarm on;side rails up x2  -SUNIL      Recorded by [SUNIL] Genet Youngblood OTA 01/04/20 1224      Row Name  01/04/20 0857             Pain Scale: Numbers Pre/Post-Treatment    Pain Scale: Numbers, Pretreatment  0/10 - no pain  -SUNIL      Pain Scale: Numbers, Post-Treatment  0/10 - no pain  -SUNIL      Recorded by [SUNIL] Genet Youngblood OTA 01/04/20 1224      Row Name 01/04/20 0857             Proprioception Assessment    Left Upper Extremity: Proprioception Assessment  mild impairment, 75% or more correct responses  -SUNIL      Right Upper Extremity: Proprioception Assessment  intact  -SUNIL      Recorded by [SUNIL] Genet Youngblood OTA 01/04/20 1224      Row Name                Wound 01/01/20 1200 Left anterior toe Abrasion    Wound - Properties Group Date first assessed: 01/01/20 [AK] Time first assessed: 1200 [AK] Present on Hospital Admission: Y [AK] Side: Left [AK] Orientation: anterior [AK] Location: toe [AK], 2nd toe   Primary Wound Type: Abrasion [AK], toe nail removed traumatically   Recorded by:  [AK] Marisel Christensen RN 01/01/20 1356    Row Name 01/04/20 0857             Coping    Observed Emotional State  accepting;calm;cooperative  -SUNIL      Verbalized Emotional State  acceptance  -SUNIL      Recorded by [SUNIL] Genet Youngblood OTA 01/04/20 1224      Row Name 01/04/20 0857             Plan of Care Review    Plan of Care Reviewed With  patient  -SUNIL      Progress  improving  -SUNIL      Recorded by [SUNIL] Genet Youngblood OTA 01/04/20 1224      Row Name 01/04/20 0857             Outcome Summary/Treatment Plan (OT)    Daily Summary of Progress (OT)  progress toward functional goals is good  -SUNIL      Anticipated Discharge Disposition (OT)  anticipate therapy at next level of care  -SUNIL      Recorded by [SUNIL] Genet Youngblood OTA 01/04/20 1224        User Key  (r) = Recorded By, (t) = Taken By, (c) = Cosigned By    Initials Name Effective Dates Discipline    AK Marisel Christensen RN 10/17/16 -  Nurse    Genet Saleem OTA 11/05/19 -  OT        Wound 01/01/20 1200 Left anterior toe Abrasion (Active)   Dressing Appearance open to air 1/4/2020   7:58 AM   Closure Open to air 1/4/2020  7:58 AM   Base maroon/purple 1/4/2020  7:58 AM   Periwound redness 1/3/2020  2:00 PM   Periwound Skin Turgor soft 1/3/2020  2:00 PM   Edges irregular 1/3/2020  2:00 PM   Drainage Amount none 1/4/2020  7:58 AM     Rehab Goal Summary     Row Name 01/04/20 0857             Occupational Therapy Goals    Transfer Goal Selection (OT)  transfer, OT goal 1  -SUNIL      Bathing Goal Selection (OT)  bathing, OT goal 1  -SUNIL      Dressing Goal Selection (OT)  dressing, OT goal 1  -SUNIL      Toileting Goal Selection (OT)  toileting, OT goal 1  -SUNIL      Grooming Goal Selection (OT)  grooming, OT goal 1  -SUNIL      Self-Feeding Goal Selection (OT)  self feeding, OT goal 1  -SUNIL      Functional Mobility Goal Selection (OT)  functional mobility, OT goal 1  -SUNIL      Coordination Goal Selection (OT)  coordination, OT goal 1;coordination, OT goal 2  -SUNIL         Transfer Goal 1 (OT)    Activity/Assistive Device (Transfer Goal 1, OT)  toilet  -SUNIL      [place] Level/Cues Needed (Transfer Goal 1, OT)  standby assist  -SUNIL      Time Frame (Transfer Goal 1, OT)  long term goal (LTG);by discharge  -SUNIL      Progress/Outcome (Transfer Goal 1, OT)  goal not met  -SUNIL         Bathing Goal 1 (OT)    Activity/Assistive Device (Bathing Goal 1, OT)  bathing skills, all  -SUNIL      [place] Level/Cues Needed (Bathing Goal 1, OT)  set-up required;verbal cues required;standby assist  -SUNIL      Time Frame (Bathing Goal 1, OT)  long term goal (LTG);by discharge  -SUNIL      Progress/Outcomes (Bathing Goal 1, OT)  goal not met  -SUNIL         Dressing Goal 1 (OT)    Activity/Assistive Device (Dressing Goal 1, OT)  dressing skills, all  -SUNIL      [place]/Cues Needed (Dressing Goal 1, OT)  set-up required;verbal cues required;standby assist  -SUNIL      Time Frame (Dressing Goal 1, OT)  long term goal (LTG);by discharge  -SUNIL      Progress/Outcome (Dressing Goal 1, OT)  goal not met  -SUNIL         Toileting Goal 1 (OT)     Activity/Device (Toileting Goal 1, OT)  toileting skills, all  -SUNIL      [place] Level/Cues Needed (Toileting Goal 1, OT)  standby assist  -SUNIL      Time Frame (Toileting Goal 1, OT)  long term goal (LTG);by discharge  -SUNIL      Progress/Outcome (Toileting Goal 1, OT)  goal not met  -SUNIL         Grooming Goal 1 (OT)    Activity/Device (Grooming Goal 1, OT)  grooming skills, all  -SUNIL      [place] (Grooming Goal 1, OT)  set-up required;verbal cues required;standby assist  -SUNIL      Time Frame (Grooming Goal 1, OT)  long term goal (LTG);by discharge  -SUNIL      Progress/Outcome (Grooming Goal 1, OT)  goal not met  -SUNIL         Self-Feeding Goal 1 (OT)    Activity/Assistive Device (Self-Feeding Goal 1, OT)  self-feeding skills, all  -SUNIL      [place] Level/Cues Needed (Self-Feeding Goal 1, OT)  set-up required;verbal cues required;standby assist  -SUNIL      Time Frame (Self-Feeding Goal 1, OT)  long term goal (LTG);by discharge  -SUNIL      Progress/Outcomes (Self-Feeding Goal 1, OT)  goal not met  -SUNIL         Functional Mobility Goal 1 (OT)    Activity/Assistive Device (Functional Mobility Goal 1, OT)  -- AD as appropriate   -SUNIL      [place] Level/Cues Needed (Functional Mobility Goal 1, OT)  standby assist  -SUNIL      Distance Goal 1 (Functional Mobility, OT)  for ADL tasks no LOB and good safety  -SUNIL      Time Frame (Functional Mobility Goal 1, OT)  long term goal (LTG);by discharge  -SUNIL      Progress/Outcome (Functional Mobility Goal 1, OT)  goal not met  -SUNIL         Coordination Goal 1 (OT)    Activity/Assistive Device (Coordination Goal 1, OT)  FM task BUE fair coordination   -SUNIL      [place] Level/Cues Needed (Coordination Goal 1, OT)  set-up required;standby assist  -SUNIL      Time Frame (Coordination Goal 1, OT)  long term goal (LTG);by discharge  -SUNIL      Progress/Outcomes (Coordination Goal 1, OT)  goal met continue  -SUNIL         Coordination Goal 2 (OT)    Activity/Assistive Device (Coordination Goal  2, OT)  GM task BUE fair coordination   -SUNIL      [place] Level/Cues Needed (Coordination Goal 2, OT)  standby assist;set-up required  -SUNIL      Time Frame (Coordination Goal 2, OT)  long term goal (LTG);by discharge  -SUNIL      Progress/Outcomes (Coordination Goal 2, OT)  goal not met  -SUNIL        User Key  (r) = Recorded By, (t) = Taken By, (c) = Cosigned By    Initials Name Provider Type Discipline    Genet Saleem OTA Occupational Therapy Assistant OT            OT Recommendation and Plan  Outcome Summary/Treatment Plan (OT)  Daily Summary of Progress (OT): progress toward functional goals is good  Anticipated Discharge Disposition (OT): anticipate therapy at next level of care  Therapy Frequency (OT Eval): daily  Daily Summary of Progress (OT): progress toward functional goals is good  Plan of Care Review  Plan of Care Reviewed With: patient  Plan of Care Reviewed With: patient  Outcome Summary: Pt deferred ADL's but agreed to FM ax. Pt. Performed tip to tip finger exercised and FM ax with use of peg board. Pt completed 25 pegs on this date with vc's and extended time needed. Pt recieved foam to build up handles on eating utensils with explination of use. Pt stated that built up handles would be beneficial and agreed to use.  Outcome Measures     Row Name 01/04/20 0857 01/03/20 1500 01/02/20 1040       How much help from another person do you currently need...    Turning from your back to your side while in flat bed without using bedrails?  --  --  3  -OCRINNA    Moving from lying on back to sitting on the side of a flat bed without bedrails?  --  --  3  -CORINNA    Moving to and from a bed to a chair (including a wheelchair)?  --  --  3  -CORINNA    Standing up from a chair using your arms (e.g., wheelchair, bedside chair)?  --  --  3  -CORINNA    Climbing 3-5 steps with a railing?  --  --  2  -CORINNA    To walk in hospital room?  --  --  2  -CORINNA    AM-PAC 6 Clicks Score (PT)  --  --  16  -CORINNA       How much help from another is  currently needed...    Putting on and taking off regular lower body clothing?  2  -SUNIL  2  -CS  --    Bathing (including washing, rinsing, and drying)  2  -SUNIL  2  -CS  --    Toileting (which includes using toilet bed pan or urinal)  2  -SUNIL  2  -CS  --    Putting on and taking off regular upper body clothing  2  -SUNIL  2  -CS  --    Taking care of personal grooming (such as brushing teeth)  2  -SUNIL  2  -CS  --    Eating meals  2  -SUNIL  2  -CS  --    AM-PAC 6 Clicks Score (OT)  12  -SUNIL  12  -CS  --       Modified Richardson Scale    Modified Kumar Scale  --  --  4 - Moderately severe disability.  Unable to walk without assistance, and unable to attend to own bodily needs without assistance.  -       Functional Assessment    Outcome Measure Options  AM-PAC 6 Clicks Daily Activity (OT)  -  --  AM-PAC 6 Clicks Basic Mobility (PT);Modified Kumar  -    Row Name 01/02/20 1033             How much help from another is currently needed...    Putting on and taking off regular lower body clothing?  2  -      Bathing (including washing, rinsing, and drying)  2  -      Toileting (which includes using toilet bed pan or urinal)  2  -      Putting on and taking off regular upper body clothing  2  -      Taking care of personal grooming (such as brushing teeth)  2  -      Eating meals  2  -      AM-PAC 6 Clicks Score (OT)  12  -         Modified Kumar Scale    Modified Richardson Scale  4 - Moderately severe disability.  Unable to walk without assistance, and unable to attend to own bodily needs without assistance.  -         Functional Assessment    Outcome Measure Options  AM-PAC 6 Clicks Daily Activity (OT);Modified Kumar;Modified Barthel Index  -        User Key  (r) = Recorded By, (t) = Taken By, (c) = Cosigned By    Initials Name Provider Type     Bruna Gentile, OTR/L Occupational Therapist    Luci Montes, PT Physical Therapist    CS Simi Rodriguez, CHIANG/L Occupational Therapy Assistant    SUNIL Youngblood,  QUINTEN De León Occupational Therapy Assistant           Time Calculation:   Time Calculation- OT     Row Name 01/04/20 1238             Time Calculation- OT    OT Start Time  0857  -SUNIL      OT Stop Time  0950  -SUNIL      OT Time Calculation (min)  53 min  -SUNIL      Total Timed Code Minutes- OT  53 minute(s)  -SUNIL      OT Received On  01/04/20  -SUNIL        User Key  (r) = Recorded By, (t) = Taken By, (c) = Cosigned By    Initials Name Provider Type    Genet Saleem OTA Occupational Therapy Assistant        Therapy Charges for Today     Code Description Service Date Service Provider Modifiers Qty    [id number]  OT THERAPEUTIC ACT EA 15 MIN 1/4/2020 Genet Youngblood OTA GO 4               QUINTEN Young  1/4/2020

## 2020-01-05 PROCEDURE — 97116 GAIT TRAINING THERAPY: CPT

## 2020-01-05 PROCEDURE — 94799 UNLISTED PULMONARY SVC/PX: CPT

## 2020-01-05 PROCEDURE — 94760 N-INVAS EAR/PLS OXIMETRY 1: CPT

## 2020-01-05 PROCEDURE — 97535 SELF CARE MNGMENT TRAINING: CPT

## 2020-01-05 RX ADMIN — SODIUM CHLORIDE, PRESERVATIVE FREE 10 ML: 5 INJECTION INTRAVENOUS at 20:49

## 2020-01-05 RX ADMIN — ASPIRIN 81 MG 81 MG: 81 TABLET ORAL at 08:07

## 2020-01-05 RX ADMIN — BUDESONIDE AND FORMOTEROL FUMARATE DIHYDRATE 2 PUFF: 160; 4.5 AEROSOL RESPIRATORY (INHALATION) at 20:03

## 2020-01-05 RX ADMIN — DOXYCYCLINE 100 MG: 100 CAPSULE ORAL at 08:07

## 2020-01-05 RX ADMIN — SERTRALINE HYDROCHLORIDE 100 MG: 50 TABLET ORAL at 08:07

## 2020-01-05 RX ADMIN — GABAPENTIN 200 MG: 100 CAPSULE ORAL at 20:49

## 2020-01-05 RX ADMIN — ATORVASTATIN CALCIUM 40 MG: 40 TABLET, FILM COATED ORAL at 20:49

## 2020-01-05 RX ADMIN — BUDESONIDE AND FORMOTEROL FUMARATE DIHYDRATE 2 PUFF: 160; 4.5 AEROSOL RESPIRATORY (INHALATION) at 07:18

## 2020-01-05 RX ADMIN — GABAPENTIN 200 MG: 100 CAPSULE ORAL at 08:07

## 2020-01-05 RX ADMIN — GABAPENTIN 200 MG: 100 CAPSULE ORAL at 16:30

## 2020-01-05 RX ADMIN — CLOPIDOGREL BISULFATE 75 MG: 75 TABLET ORAL at 08:07

## 2020-01-05 RX ADMIN — DOCUSATE SODIUM 100 MG: 100 CAPSULE, LIQUID FILLED ORAL at 08:07

## 2020-01-05 RX ADMIN — ATENOLOL 25 MG: 25 TABLET ORAL at 08:07

## 2020-01-05 RX ADMIN — DOXYCYCLINE 100 MG: 100 CAPSULE ORAL at 20:49

## 2020-01-05 RX ADMIN — SODIUM CHLORIDE, PRESERVATIVE FREE 10 ML: 5 INJECTION INTRAVENOUS at 08:07

## 2020-01-05 NOTE — PROGRESS NOTES
[facility] Medicine Services  INPATIENT PROGRESS NOTE    Length of Stay: 3  Date of Admission: 1/1/2020  Primary Care Physician: Lorenzo Mondragon MD    Subjective   Chief Complaint: Left-sided weakness  HPI: 64-year-old  male with past medical history of hypertension, coronary artery disease, previous CVA who presented on 1/1/2020 with complaints of right-sided facial numbness and weakness down the left side of his body.  Patient is currently admitted related to acute frontal lobe infarct.  The patient has been followed by tele-neurology service and has been recommended aspirin, Plavix, statin, PT, OT.  He is currently hospitalized awaiting SNF placement for further rehab.  During today's visit, the patient denies any complaints.    Review of Systems   Constitutional: Negative for chills and fever.   Respiratory: Negative for cough, chest tightness, shortness of breath and wheezing.    Cardiovascular: Negative for chest pain, palpitations and leg swelling.   Gastrointestinal: Negative for abdominal pain, diarrhea, nausea and vomiting.   Musculoskeletal: Negative for back pain and neck pain.   Skin: Negative for pallor.   Neurological: Negative for dizziness and weakness.   Psychiatric/Behavioral: Negative for confusion. The patient is not nervous/anxious.         All pertinent negatives and positives are as above. All other systems have been reviewed and are negative unless otherwise stated.     Objective    Temp:  [97 °F (36.1 °C)-97.8 °F (36.6 °C)] 97.7 °F (36.5 °C)  Heart Rate:  [73-80] 74  Resp:  [18-20] 18  BP: (119-133)/(59-74) 124/64    Physical Exam   Constitutional: He is oriented to person, place, and time. He appears well-developed and well-nourished. No distress.   HENT:   Head: Normocephalic and atraumatic.   Right Ear: External ear normal.   Left Ear: External ear normal.   Nose: Nose normal.   Eyes: Conjunctivae are normal. Right eye exhibits no  discharge. Left eye exhibits no discharge. No scleral icterus.   Neck: Normal range of motion. Neck supple. No JVD present.   Cardiovascular: Normal rate, regular rhythm, normal heart sounds and intact distal pulses. Exam reveals no gallop and no friction rub.   No murmur heard.  Pulmonary/Chest: Effort normal and breath sounds normal. No stridor. No respiratory distress. He has no wheezes. He has no rales.   Abdominal: Soft. Bowel sounds are normal. He exhibits no distension. There is no tenderness.   Musculoskeletal: Normal range of motion. He exhibits no edema.   Neurological: He is alert and oriented to person, place, and time.   Skin: Skin is warm and dry. He is not diaphoretic.   Psychiatric: He has a normal mood and affect. His behavior is normal.   Nursing note and vitals reviewed.          Results Review:  I have reviewed the labs, radiology results, and diagnostic studies.    Laboratory Data:   Results from last 7 days   Lab Units 01/01/20  0859   SODIUM mmol/L 138   POTASSIUM mmol/L 4.0   CHLORIDE mmol/L 97*   CO2 mmol/L 26.0   BUN mg/dL 16   CREATININE mg/dL 1.33*   GLUCOSE mg/dL 95   CALCIUM mg/dL 9.4   BILIRUBIN mg/dL 0.6   ALK PHOS U/L 102   ALT (SGPT) U/L 7   AST (SGOT) U/L 13   ANION GAP mmol/L 15.0     Estimated Creatinine Clearance: 49.5 mL/min (A) (by C-G formula based on SCr of 1.33 mg/dL (H)).          Results from last 7 days   Lab Units 01/04/20  0722 01/02/20  0527 01/01/20  0859   WBC 10*3/mm3 12.87* 12.80* 21.16*   HEMOGLOBIN g/dL 10.5* 10.8* 12.7*   HEMATOCRIT % 32.4* 32.9* 38.9   PLATELETS 10*3/mm3 299 278 366     Results from last 7 days   Lab Units 01/01/20  0859   INR  1.19       Culture Data:   No results found for: BLOODCX  No results found for: URINECX  No results found for: RESPCX  No results found for: WOUNDCX  No results found for: STOOLCX  No components found for: BODYFLD    Radiology Data:   Imaging Results (Last 24 Hours)     ** No results found for the last 24 hours. **           I have reviewed the patient's current medications.     Assessment/Plan     Active Hospital Problems    Diagnosis   • **Cerebrovascular accident (CVA) (CMS/[facility])   • Hypertension   • Elevated cholesterol       Plan:    1.  Acute frontal lobe infarct: Continue PT, OT.  Continue aspirin, Plavix, statin.  Awaiting placement at skilled nursing facility for further rehabilitation.  Continue atenolol for blood pressure management.  2.  Hypertension: Continue atenolol  3.  Hyperlipidemia: Continue statin.      Discharge Planning: Patient accepted to [facility] on Monday, 1/6/2020.          This document has been electronically signed by RENALDO Chase on January 5, 2020 3:29 PM

## 2020-01-05 NOTE — THERAPY TREATMENT NOTE
Acute Care - Physical Therapy Treatment Note  [facility]     Patient Name: Aubrey Montilla  : 1955  MRN: [id number]  Today's Date: 2020  Onset of Illness/Injury or Date of Surgery: 20  Date of Referral to PT: 20  Referring Physician: Dr. Pratt    Admit Date: 2020    Visit Dx:    ICD-10-CM ICD-9-CM   1. Cerebrovascular accident (CVA), unspecified mechanism (CMS/HCC) I63.9 434.91   2. Symbolic dysfunction R48.9 784.60   3. Impaired mobility and ADLs Z74.09 799.89   4. Impaired functional mobility, balance, gait, and endurance Z74.09 V49.89     Patient Active Problem List   Diagnosis   • Cerebrovascular accident (CVA) (CMS/HCC)   • Hypertension   • Elevated cholesterol       Therapy Treatment    Rehabilitation Treatment Summary     Row Name 20 1425 20 0825          Treatment Time/Intention    Discipline  physical therapy assistant  -EM  occupational therapy assistant  -LW     Document Type  therapy note (daily note)  -EM  therapy note (daily note)  -LW     Subjective Information  no complaints  -EM  no complaints  -LW     Mode of Treatment  physical therapy;individual therapy  -EM  occupational therapy  -LW     Patient/Family Observations  Spouse present in room  -EM  Pt supine in bed. No family present.   -LW     Care Plan Review  --  care plan/treatment goals reviewed  -LW     Total Minutes, Occupational Therapy Treatment  --  85  -LW     Therapy Frequency (OT Eval)  --  daily  -LW     Patient Effort  good  -EM  good  -LW     Existing Precautions/Restrictions  fall  -EM  fall  -LW     Equipment Issued to Patient  --  gait belt  -LW     Recorded by [EM] Maximilian Noriega, NATALIE 20 1617 [LW] Carmella Saucedo, АНДРЕЙ/L 20 1432     Row Name 20 1425 20 0825          Vital Signs    Pre Systolic BP Rehab  109  -EM  117  -LW     Pre Treatment Diastolic BP  55  -EM  55  -LW     Post Systolic BP Rehab  133  -EM  --     Post Treatment Diastolic BP  87  -EM   --     Intratreatment Heart Rate (beats/min)  75  -EM  88  -LW     Posttreatment Heart Rate (beats/min)  --  83  -LW     Pretreatment Resp Rate (breaths/min)  76  -EM  79  -LW     Pre SpO2 (%)  94  -EM  100  -LW     O2 Delivery Pre Treatment  room air  -EM  room air  -LW     Intra SpO2 (%)  --  99  -LW     O2 Delivery Intra Treatment  --  room air  -LW     Post SpO2 (%)  95  -EM  99  -LW     O2 Delivery Post Treatment  room air  -EM  room air  -LW     Pre Patient Position  Sitting  -EM  Supine  -LW     Intra Patient Position  Standing  -EM  Sitting  -LW     Post Patient Position  Supine  -EM  Sitting  -LW     Recorded by [EM] Maximilian Noriega, NATALIE 01/05/20 1617 [LW] Carmella Saucedo COTA/L 01/05/20 1432     Row Name 01/05/20 1425 01/05/20 0825          Cognitive Assessment/Intervention- PT/OT    Affect/Mental Status (Cognitive)  WFL  -EM  WFL  -LW     Orientation Status (Cognition)  oriented x 4  -EM  oriented x 4  -LW     Follows Commands (Cognition)  follows two step commands;75-90% accuracy  -EM  follows two step commands;75-90% accuracy  -LW     Safety Deficit (Cognitive)  mild deficit  -EM  mild deficit  -LW     Personal Safety Interventions  fall prevention program maintained;gait belt;nonskid shoes/slippers when out of bed;supervised activity  -EM  fall prevention program maintained;gait belt;nonskid shoes/slippers when out of bed;supervised activity  -LW     Recorded by [EM] Maximilian Noriega PTA 01/05/20 1617 [LW] Carmella Saucedo COTA/L 01/05/20 1432     Row Name 01/05/20 0825             Safety Issues, Functional Mobility    Safety Issues Affecting Function (Mobility)  ability to follow commands  -LW      Impairments Affecting Function (Mobility)  balance;endurance/activity tolerance;cognition;motor control;postural/trunk control;range of motion (ROM)  -LW      Recorded by [LW] Carmella Saucedo COTA/L 01/05/20 1432      Row Name 01/05/20 1425 01/05/20 0825          Bed Mobility Assessment/Treatment    Bed  Mobility Assessment/Treatment  --  bed mobility (all) activities;supine-sit  -LW     Supine-Sit [place] (Bed Mobility)  --  conditional independence  -LW     Sit-Supine [place] (Bed Mobility)  supervision  -EM  --     Bed Mobility, Safety Issues  --  decreased use of arms for pushing/pulling;decreased use of legs for bridging/pushing  -LW     Assistive Device (Bed Mobility)  head of bed elevated;bed rails  -EM  bed rails;head of bed elevated  -LW     Recorded by [EM] Maximilian Noriega, PTA 01/05/20 1617 [LW] Carmella Saucedo COTA/L 01/05/20 1432     Row Name 01/05/20 0825             Transfer Assessment/Treatment    Transfer Assessment/Treatment  bed-chair transfer;sit-stand transfer;stand-sit transfer  -LW      Recorded by [LW] Carmella Saucedo COTA/L 01/05/20 1432      Row Name 01/05/20 0825             Bed-Chair Transfer    Bed-Chair [place] (Transfers)  minimum assist (75% patient effort)  -LW      Assistive Device (Bed-Chair Transfers)  walker, front-wheeled  -LW      Recorded by [LW] Carmella Saucedo COTA/L 01/05/20 1432      Row Name 01/05/20 1425 01/05/20 0825          Sit-Stand Transfer    Sit-Stand [place] (Transfers)  supervision  -EM  minimum assist (75% patient effort)  -LW     Assistive Device (Sit-Stand Transfers)  walker, front-wheeled  -EM  walker, front-wheeled  -LW     Recorded by [EM] Maximilian Noriega, PTA 01/05/20 1617 [LW] Carmella Saucedo COTA/L 01/05/20 1432     Row Name 01/05/20 1425 01/05/20 0825          Stand-Sit Transfer    Stand-Sit [place] (Transfers)  supervision  -EM  minimum assist (75% patient effort)  -LW     Assistive Device (Stand-Sit Transfers)  walker, front-wheeled  -EM  walker, front-wheeled  -LW     Recorded by [EM] Maximilian Noriega, PTA 01/05/20 1617 [LW] Carmella Saucedo CHIANG/L 01/05/20 1432     Row Name 01/05/20 1425             Gait/Stairs Assessment/Training    [place] Level (Gait)  supervision  -EM      Assistive Device (Gait)  walker,  front-wheeled  -EM      Distance in Feet (Gait)  210'  -EM      Deviations/Abnormal Patterns (Gait)  base of support, narrow;stephan decreased;gait speed decreased;stride length decreased  -EM      Right Sided Gait Deviations  leans right  -EM      Comment (Gait/Stairs)  Pt slightly unsteady, but no LOB noted  -EM      Recorded by [EM] Maximilian Noriega, PTA 01/05/20 1617      Row Name 01/05/20 0825             ADL Assessment/Intervention    BADL Assessment/Intervention  bathing;upper body dressing;lower body dressing;grooming;feeding  -LW      Recorded by [LW] Carmella Saucedo COTA/L 01/05/20 1432      Row Name 01/05/20 0825             Bathing Assessment/Intervention    Bathing [place] Level  bathing skills;lower body;upper body;contact guard assist;set up Pt needing many vc's to keep on task.  -LW      Assistive Devices (Bathing)  other (see comments) Pan bath  -LW      Bathing Position  edge of bed sitting  -LW      Recorded by [LW] Carmella Saucedo COTA/L 01/05/20 1432      Row Name 01/05/20 0825             Upper Body Dressing Assessment/Training    Upper Body Dressing [place] Level  upper body dressing skills;doff;don;contact guard assist;set up;other (see comments) Hospital gown  -LW      Upper Body Dressing Position  edge of bed sitting  -LW      Recorded by [LW] Carmella Saucedo COTA/L 01/05/20 1432      Row Name 01/05/20 0825             Lower Body Dressing Assessment/Training    Lower Body Dressing [place] Level  lower body dressing skills;doff;don;pants/bottoms;socks;contact guard assist;set up  -LW      Lower Body Dressing Position  edge of bed sitting;supported standing  -LW      Recorded by [LW] Carmella Saucedo COTA/L 01/05/20 1432      Row Name 01/05/20 0825             Grooming Assessment/Training    [place] Level (Grooming)  grooming skills;hair care, combing/brushing;oral care regimen;shave face;wash face, hands;set up;verbal cues;minimum assist (75% patient effort)  -LW       Grooming Position  edge of bed sitting  -LW      Recorded by [LW] Carmella Saucedo COTA/L 01/05/20 1432      Row Name 01/05/20 0825             Self-Feeding Assessment/Training    [place] Level (Feeding)  feeding skills;liquids to mouth;prepare tray/open items;scoop food and bring to mouth;minimum assist (75% patient effort);set up  -LW      Assistive Devices (Feeding)  built-up handle utensils  -LW      Self-Feeding Assess/Train, Spillage Amount  moderate  -LW      Position (Self-Feeding)  sitting up in bed  -LW      Recorded by [LW] Carmella Saucedo COTA/L 01/05/20 1432      Row Name 01/05/20 0825             BADL Safety/Performance    Impairments, BADL Safety/Performance  balance;cognition;endurance/activity tolerance;grasp/prehension;motor control;range of motion;strength  -LW      Recorded by [LW] Carmella Saucedo COTA/L 01/05/20 1432      Row Name 01/05/20 1425 01/05/20 0825          Positioning and Restraints    Pre-Treatment Position  sitting in chair/recliner  -EM  in bed  -LW     Post Treatment Position  bed  -EM  chair  -LW     In Chair  reclined;call light within reach;encouraged to call for assist;exit alarm on  -EM  notified nsg;sitting;call light within reach;encouraged to call for assist;exit alarm on  -LW     Recorded by [EM] Maximilian Noriega, PTA 01/05/20 1617 [LW] Carmella Saucedo COTA/L 01/05/20 1432     Row Name 01/05/20 1425 01/05/20 0825          Pain Scale: Numbers Pre/Post-Treatment    Pain Scale: Numbers, Pretreatment  0/10 - no pain  -EM  0/10 - no pain  -LW     Pain Scale: Numbers, Post-Treatment  0/10 - no pain  -EM  0/10 - no pain  -LW     Recorded by [EM] Maximilian Noriega, PTA 01/05/20 1617 [LW] Carmella Saucedo CHIANG/L 01/05/20 1432     Row Name                Wound 01/01/20 1200 Left anterior toe Abrasion    Wound - Properties Group Date first assessed: 01/01/20 [AK] Time first assessed: 1200 [AK] Present on Hospital Admission: Y [AK] Side: Left [AK] Orientation: anterior  [AK] Location: toe [AK], 2nd toe   Primary Wound Type: Abrasion [AK], toe nail removed traumatically   Recorded by:  [AK] Marisel Christensen RN 01/01/20 1356    Row Name 01/05/20 0825             Coping    Observed Emotional State  accepting;calm;cooperative  -LW      Verbalized Emotional State  acceptance  -LW      Recorded by [LW] Carmella Saucedo COTA/L 01/05/20 1432      Row Name 01/05/20 0825             Plan of Care Review    Plan of Care Reviewed With  patient  -LW      Progress  improving  -LW      Recorded by [LW] Carmella Saucedo COTA/L 01/05/20 1432      Row Name 01/05/20 0825             Outcome Summary/Treatment Plan (OT)    Daily Summary of Progress (OT)  progress toward functional goals is good  -LW      Plan for Continued Treatment (OT)  Continue POC  -LW      Anticipated Discharge Disposition (OT)  anticipate therapy at next level of care  -LW      Recorded by [LW] Carmella Saucedo COTA/L 01/05/20 1432      Row Name 01/05/20 1425             Outcome Summary/Treatment Plan (PT)    Plan for Continued Treatment (PT)  Continue gt and bed mobility.   -EM      Anticipated Discharge Disposition (PT)  anticipate therapy at next level of care  -EM      Recorded by [EM] Maximilian Noriega, PTA 01/05/20 1617        User Key  (r) = Recorded By, (t) = Taken By, (c) = Cosigned By    Initials Name Effective Dates Discipline    EM Maximilian Noriega, PTA 08/11/15 -  PT    AK Marisel Christensen RN 10/17/16 -  Nurse    LW Carmella Saucedo COTA/L 03/07/18 -  OT          Wound 01/01/20 1200 Left anterior toe Abrasion (Active)   Dressing Appearance open to air 1/5/2020  7:12 AM   Closure Open to air 1/5/2020  7:12 AM   Base maroon/purple 1/5/2020  7:12 AM   Periwound redness 1/5/2020  7:12 AM   [place] Temperature cool 1/5/2020  7:12 AM   [place] Skin Turgor soft 1/5/2020  7:12 AM   Drainage Amount none 1/5/2020  7:12 AM       Rehab Goal Summary     Row Name 01/05/20 1600 01/05/20 0825          Bed Mobility Goal 1 (PT)     Activity/Assistive Device (Bed Mobility Goal 1, PT)  sit to supine;supine to sit  -EM  --     [place] Level/Cues Needed (Bed Mobility Goal 1, PT)  independent  -EM  --     Time Frame (Bed Mobility Goal 1, PT)  10 days  -EM  --     Progress/Outcomes (Bed Mobility Goal 1, PT)  goal not met  -EM  --        Transfer Goal 1 (PT)    Activity/Assistive Device (Transfer Goal 1, PT)  sit-to-stand/stand-to-sit;bed-to-chair/chair-to-bed;toilet  -EM  --     [place] Level/Cues Needed (Transfer Goal 1, PT)  conditional independence  -EM  --     Time Frame (Transfer Goal 1, PT)  2 weeks  -EM  --     Progress/Outcome (Transfer Goal 1, PT)  goal not met  -EM  --        Gait Training Goal 1 (PT)    Activity/Assistive Device (Gait Training Goal 1, PT)  assistive device use;gait (walking locomotion)  -EM  --     [place] Level (Gait Training Goal 1, PT)  independent  -EM  --     Distance (Gait Goal 1, PT)  200 ft if  home w 24/7 caregiver, 1000 ft if home as primary caregiver, w/ or w/out AD.   -EM  --     Time Frame (Gait Training Goal 1, PT)  long term goal (LTG);3 weeks  -EM  --        Balance Goal 1 (PT)    Activity/Assistive Device (Balance Goal 1, PT)  standing, static;standing, dynamic  -EM  --     [place] Level/Cues Needed (Balance Goal 1, PT)  contact guard assist  -EM  --     Time Frame (Balance Goal 1, PT)  5 days  -EM  --     Progress/Outcomes (Balance Goal 1, PT)  goal not met;good progress toward goal  -EM  --        Stairs Goal 1 (PT)    Activity/Assistive Device (Stairs Goal 1, PT)  ascending stairs;descending stairs  -EM  --     [place] Level/Cues Needed (Stairs Goal 1, PT)  conditional independence;independent  -EM  --     Number of Stairs (Stairs Goal 1, PT)  4  -EM  --     Time Frame (Stairs Goal 1, PT)  3 weeks  -EM  --     Progress/Outcome (Stairs Goal 1, PT)  goal not met  -EM  --        Patient Education Goal (PT)    Activity (Patient Education Goal, PT)  pt will demonstrate safety  techniques in mobility and self care before considering d/c to home unless capable caregiver available on consistent basis  -EM  --     [place]/Cues/Accuracy (Memory Goal 2, PT)  demonstrates adequately  -EM  --     Time Frame (Patient Education Goal, PT)  long term goal (LTG);by discharge  -EM  --     Progress/Outcome (Patient Education Goal, PT)  goal not met  -EM  --        Occupational Therapy Goals    Transfer Goal Selection (OT)  --  transfer, OT goal 1  -LW     Bathing Goal Selection (OT)  --  bathing, OT goal 1  -LW     Dressing Goal Selection (OT)  --  dressing, OT goal 1  -LW     Toileting Goal Selection (OT)  --  toileting, OT goal 1  -LW     Grooming Goal Selection (OT)  --  grooming, OT goal 1  -LW     Self-Feeding Goal Selection (OT)  --  self feeding, OT goal 1  -LW     Functional Mobility Goal Selection (OT)  --  functional mobility, OT goal 1  -LW     Coordination Goal Selection (OT)  --  coordination, OT goal 1;coordination, OT goal 2  -LW        Transfer Goal 1 (OT)    Activity/Assistive Device (Transfer Goal 1, OT)  --  toilet  -LW     [place] Level/Cues Needed (Transfer Goal 1, OT)  --  standby assist  -LW     Time Frame (Transfer Goal 1, OT)  --  long term goal (LTG);by discharge  -LW     Progress/Outcome (Transfer Goal 1, OT)  --  goal not met  -LW        Bathing Goal 1 (OT)    Activity/Assistive Device (Bathing Goal 1, OT)  --  bathing skills, all  -LW     [place] Level/Cues Needed (Bathing Goal 1, OT)  --  set-up required;verbal cues required;standby assist  -LW     Time Frame (Bathing Goal 1, OT)  --  long term goal (LTG);by discharge  -LW     Progress/Outcomes (Bathing Goal 1, OT)  --  (S) goal met  -LW        Dressing Goal 1 (OT)    Activity/Assistive Device (Dressing Goal 1, OT)  --  dressing skills, all  -LW     [place]/Cues Needed (Dressing Goal 1, OT)  --  set-up required;verbal cues required;standby assist  -LW     Time Frame (Dressing Goal 1, OT)  --  long term  goal (LTG);by discharge  -LW     Progress/Outcome (Dressing Goal 1, OT)  --  (S) goal met  -LW        Toileting Goal 1 (OT)    Activity/Device (Toileting Goal 1, OT)  --  toileting skills, all  -LW     [place] Level/Cues Needed (Toileting Goal 1, OT)  --  standby assist  -LW     Time Frame (Toileting Goal 1, OT)  --  long term goal (LTG);by discharge  -LW     Progress/Outcome (Toileting Goal 1, OT)  --  goal not met  -LW        Grooming Goal 1 (OT)    Activity/Device (Grooming Goal 1, OT)  --  grooming skills, all  -LW     [place] (Grooming Goal 1, OT)  --  set-up required;verbal cues required;standby assist  -LW     Time Frame (Grooming Goal 1, OT)  --  long term goal (LTG);by discharge  -LW     Progress/Outcome (Grooming Goal 1, OT)  --  goal not met  -LW        Self-Feeding Goal 1 (OT)    Activity/Assistive Device (Self-Feeding Goal 1, OT)  --  self-feeding skills, all  -LW     [place] Level/Cues Needed (Self-Feeding Goal 1, OT)  --  set-up required;verbal cues required;standby assist  -LW     Time Frame (Self-Feeding Goal 1, OT)  --  long term goal (LTG);by discharge  -LW     Progress/Outcomes (Self-Feeding Goal 1, OT)  --  goal not met  -LW        Functional Mobility Goal 1 (OT)    Activity/Assistive Device (Functional Mobility Goal 1, OT)  --  -- AD as appropriate   -LW     [place] Level/Cues Needed (Functional Mobility Goal 1, OT)  --  standby assist  -LW     Distance Goal 1 (Functional Mobility, OT)  --  for ADL tasks no LOB and good safety  -LW     Time Frame (Functional Mobility Goal 1, OT)  --  long term goal (LTG);by discharge  -LW     Progress/Outcome (Functional Mobility Goal 1, OT)  --  goal not met  -LW        Coordination Goal 1 (OT)    Activity/Assistive Device (Coordination Goal 1, OT)  --  FM task BUE fair coordination   -LW     [place] Level/Cues Needed (Coordination Goal 1, OT)  --  set-up required;standby assist  -LW     Time Frame (Coordination Goal 1, OT)  --  long  term goal (LTG);by discharge  -LW     Progress/Outcomes (Coordination Goal 1, OT)  --  goal met continue  -LW        Coordination Goal 2 (OT)    Activity/Assistive Device (Coordination Goal 2, OT)  --  GM task BUE fair coordination   -LW     [place] Level/Cues Needed (Coordination Goal 2, OT)  --  standby assist;set-up required  -LW     Time Frame (Coordination Goal 2, OT)  --  long term goal (LTG);by discharge  -LW     Progress/Outcomes (Coordination Goal 2, OT)  --  goal not met  -LW       User Key  (r) = Recorded By, (t) = Taken By, (c) = Cosigned By    Initials Name Provider Type Discipline    EM Maximilian Norigea, PTA Physical Therapy Assistant PT    LW Carmella Saucedo, АНДРЕЙ/L Occupational Therapy Assistant OT              PT Recommendation and Plan  Anticipated Discharge Disposition (PT): anticipate therapy at next level of care  Outcome Summary/Treatment Plan (PT)  Plan for Continued Treatment (PT): Continue gt and bed mobility.   Anticipated Discharge Disposition (PT): anticipate therapy at next level of care  Outcome Summary: Pt lee tx well with good participation. Pt t/f sit-stand-sit with SBAx1. Sit-sup with SBAx1. Pt amb 210' with FWRW with CGAx1 with slight unsteadiness noted.  Outcome Measures     Row Name 01/05/20 1425 01/05/20 0825 01/04/20 0857       How much help from another person do you currently need...    Turning from your back to your side while in flat bed without using bedrails?  4  -EM  --  --    Moving from lying on back to sitting on the side of a flat bed without bedrails?  4  -EM  --  --    Moving to and from a bed to a chair (including a wheelchair)?  3  -EM  --  --    Standing up from a chair using your arms (e.g., wheelchair, bedside chair)?  4  -EM  --  --    Climbing 3-5 steps with a railing?  3  -EM  --  --    To walk in hospital room?  3  -EM  --  --    AM-PAC 6 Clicks Score (PT)  21  -EM  --  --       How much help from another is currently needed...    Putting on and taking  off regular lower body clothing?  --  2  -LW  2  -SUNIL    Bathing (including washing, rinsing, and drying)  --  2  -LW  2  -SUNIL    Toileting (which includes using toilet bed pan or urinal)  --  2  -LW  2  -SUNIL    Putting on and taking off regular upper body clothing  --  2  -LW  2  -SUNIL    Taking care of personal grooming (such as brushing teeth)  --  2  -LW  2  -SUNIL    Eating meals  --  2  -LW  2  -SUNIL    AM-PAC 6 Clicks Score (OT)  --  12  -LW  12  -SUNIL       Functional Assessment    Outcome Measure Options  AM-PAC 6 Clicks Daily Activity (OT)  -EM  --  AM-PAC 6 Clicks Daily Activity (OT)  -SUNIL    Row Name 01/03/20 1500             How much help from another is currently needed...    Putting on and taking off regular lower body clothing?  2  -CS      Bathing (including washing, rinsing, and drying)  2  -CS      Toileting (which includes using toilet bed pan or urinal)  2  -CS      Putting on and taking off regular upper body clothing  2  -CS      Taking care of personal grooming (such as brushing teeth)  2  -CS      Eating meals  2  -CS      AM-PAC 6 Clicks Score (OT)  12  -CS        User Key  (r) = Recorded By, (t) = Taken By, (c) = Cosigned By    Initials Name Provider Type    EM Maximilian Noriega PTA Physical Therapy Assistant    Simi Aviles, CHIANG/L Occupational Therapy Assistant    Carmella Marrero, CHIANG/L Occupational Therapy Assistant    Genet Saleem OTA Occupational Therapy Assistant         Time Calculation:   PT Charges     Row Name 01/05/20 1620             Time Calculation    Start Time  1425  -EM      Stop Time  1444  -EM      Time Calculation (min)  19 min  -EM         Time Calculation- PT    Total Timed Code Minutes- PT  19 minute(s)  -EM        User Key  (r) = Recorded By, (t) = Taken By, (c) = Cosigned By    Initials Name Provider Type    EM Maximilian Noriega PTA Physical Therapy Assistant        Therapy Charges for Today     Code Description Service Date Service Provider Modifiers Qty     [id number]  GAIT TRAINING EA 15 MIN 1/5/2020 Maximilian Noriega, PTA GP 1        Addendum: No spouse was present in room  PT G-Codes  Outcome Measure Options: AM-PAC 6 Clicks Daily Activity (OT)  AM-PAC 6 Clicks Score (PT): 21  AM-PAC 6 Clicks Score (OT): 12  Modified Kumar Scale: 4 - Moderately severe disability.  Unable to walk without assistance, and unable to attend to own bodily needs without assistance.    Maximilian Noriega, PTA  1/5/2020

## 2020-01-05 NOTE — THERAPY TREATMENT NOTE
Acute Care - Occupational Therapy Treatment Note  [facility]     Patient Name: Aubrey Montilla  : 1955  MRN: [id number]  Today's Date: 2020  Onset of Illness/Injury or Date of Surgery: 20  Date of Referral to OT: 20  Referring Physician: Dr. Pratt    Admit Date: 2020       ICD-10-CM ICD-9-CM   1. Cerebrovascular accident (CVA), unspecified mechanism (CMS/HCC) I63.9 434.91   2. Symbolic dysfunction R48.9 784.60   3. Impaired mobility and ADLs Z74.09 799.89   4. Impaired functional mobility, balance, gait, and endurance Z74.09 V49.89     Patient Active Problem List   Diagnosis   • Cerebrovascular accident (CVA) (CMS/HCC)   • Hypertension   • Elevated cholesterol     Past Medical History:   Diagnosis Date   • Elevated cholesterol    • Hypertension      Past Surgical History:   Procedure Laterality Date   • ENDOSCOPY N/A 2017    Procedure: ESOPHAGOGASTRODUODENOSCOPY;  Surgeon: Van Khoury DO;  Location: [facility] ENDOSCOPY;  Service:    • ENDOSCOPY N/A 2019    Procedure: ESOPHAGOGASTRODUODENOSCOPY;  Surgeon: Van Khoury DO;  Location: [facility] ENDOSCOPY;  Service: Gastroenterology       Therapy Treatment    Rehabilitation Treatment Summary     Row Name 20 0825             Treatment Time/Intention    Discipline  occupational therapy assistant  -LW      Document Type  therapy note (daily note)  -LW      Subjective Information  no complaints  -LW      Mode of Treatment  occupational therapy  -LW      Patient/Family Observations  Pt supine in bed. No family present.   -LW      Care Plan Review  care plan/treatment goals reviewed  -LW      Total Minutes, Occupational Therapy Treatment  85  -LW      Therapy Frequency (OT Eval)  daily  -LW      Patient Effort  good  -LW      Existing Precautions/Restrictions  fall  -LW      Equipment Issued to Patient  gait belt  -LW      Recorded by [LW] Carmella Saucedo COTA/L 20 1432      Row Name 20 0850              Vital Signs    Pre Systolic BP Rehab  117  -LW      Pre Treatment Diastolic BP  55  -LW      Intratreatment Heart Rate (beats/min)  88  -LW      Posttreatment Heart Rate (beats/min)  83  -LW      Pretreatment Resp Rate (breaths/min)  79  -LW      Pre SpO2 (%)  100  -LW      O2 Delivery Pre Treatment  room air  -LW      Intra SpO2 (%)  99  -LW      O2 Delivery Intra Treatment  room air  -LW      Post SpO2 (%)  99  -LW      O2 Delivery Post Treatment  room air  -LW      Pre Patient Position  Supine  -LW      Intra Patient Position  Sitting  -LW      Post Patient Position  Sitting  -LW      Recorded by [LW] Carmella Saucedo COTA/L 01/05/20 1432      Row Name 01/05/20 0825             Cognitive Assessment/Intervention- PT/OT    Affect/Mental Status (Cognitive)  WFL  -LW      Orientation Status (Cognition)  oriented x 4  -LW      Follows Commands (Cognition)  follows two step commands;75-90% accuracy  -LW      Safety Deficit (Cognitive)  mild deficit  -LW      Personal Safety Interventions  fall prevention program maintained;gait belt;nonskid shoes/slippers when out of bed;supervised activity  -LW      Recorded by [LW] Carmella Saucedo COTA/L 01/05/20 1432      Row Name 01/05/20 0825             Safety Issues, Functional Mobility    Safety Issues Affecting Function (Mobility)  ability to follow commands  -LW      Impairments Affecting Function (Mobility)  balance;endurance/activity tolerance;cognition;motor control;postural/trunk control;range of motion (ROM)  -LW      Recorded by [LW] Carmella Saucedo COTA/L 01/05/20 1432      Row Name 01/05/20 0825             Bed Mobility Assessment/Treatment    Bed Mobility Assessment/Treatment  bed mobility (all) activities;supine-sit  -LW      Supine-Sit [place] (Bed Mobility)  conditional independence  -LW      Bed Mobility, Safety Issues  decreased use of arms for pushing/pulling;decreased use of legs for bridging/pushing  -LW      Assistive Device (Bed Mobility)  bed  rails;head of bed elevated  -LW      Recorded by [LW] Carmella Saucedo COTA/L 01/05/20 1432      Row Name 01/05/20 0825             Transfer Assessment/Treatment    Transfer Assessment/Treatment  bed-chair transfer;sit-stand transfer;stand-sit transfer  -LW      Recorded by [LW] Carmella Saucedo COTA/L 01/05/20 1432      Row Name 01/05/20 0825             Bed-Chair Transfer    Bed-Chair [place] (Transfers)  minimum assist (75% patient effort)  -LW      Assistive Device (Bed-Chair Transfers)  walker, front-wheeled  -LW      Recorded by [LW] Carmella Saucedo COTA/L 01/05/20 1432      Row Name 01/05/20 0825             Sit-Stand Transfer    Sit-Stand [place] (Transfers)  minimum assist (75% patient effort)  -LW      Assistive Device (Sit-Stand Transfers)  walker, front-wheeled  -LW      Recorded by [LW] Carmella Saucedo COTA/L 01/05/20 1432      Row Name 01/05/20 0825             Stand-Sit Transfer    Stand-Sit [place] (Transfers)  minimum assist (75% patient effort)  -LW      Assistive Device (Stand-Sit Transfers)  walker, front-wheeled  -LW      Recorded by [LW] Carmella Saucedo COTA/L 01/05/20 1432      Row Name 01/05/20 0825             ADL Assessment/Intervention    BADL Assessment/Intervention  bathing;upper body dressing;lower body dressing;grooming;feeding  -LW      Recorded by [LW] Carmella Saucedo COTA/L 01/05/20 1432      Row Name 01/05/20 0825             Bathing Assessment/Intervention    Bathing [place] Level  bathing skills;lower body;upper body;contact guard assist;set up Pt needing many vc's to keep on task.  -LW      Assistive Devices (Bathing)  other (see comments) Pan bath  -LW      Bathing Position  edge of bed sitting  -LW      Recorded by [LW] Carmella Saucedo COTA/L 01/05/20 1432      Row Name 01/05/20 0825             Upper Body Dressing Assessment/Training    Upper Body Dressing [place] Level  upper body dressing skills;doff;don;contact guard assist;set up;other  (see comments) Hospital gon  -LW      Upper Body Dressing Position  edge of bed sitting  -LW      Recorded by [LW] Carmella Saucedo COTA/L 01/05/20 1432      Row Name 01/05/20 0825             Lower Body Dressing Assessment/Training    Lower Body Dressing [place] Level  lower body dressing skills;doff;don;pants/bottoms;socks;contact guard assist;set up  -LW      Lower Body Dressing Position  edge of bed sitting;supported standing  -LW      Recorded by [LW] Carmella Saucedo COTA/L 01/05/20 1432      Row Name 01/05/20 0825             Grooming Assessment/Training    [place] Level (Grooming)  grooming skills;hair care, combing/brushing;oral care regimen;shave face;wash face, hands;set up;verbal cues;minimum assist (75% patient effort)  -LW      Grooming Position  edge of bed sitting  -LW      Recorded by [LW] Carmella Saucedo COTA/L 01/05/20 1432      Row Name 01/05/20 0825             Self-Feeding Assessment/Training    [place] Level (Feeding)  feeding skills;liquids to mouth;prepare tray/open items;scoop food and bring to mouth;minimum assist (75% patient effort);set up  -LW      Assistive Devices (Feeding)  built-up handle utensils  -LW      Self-Feeding Assess/Train, Spillage Amount  moderate  -LW      Position (Self-Feeding)  sitting up in bed  -LW      Recorded by [LW] Carmella Saucedo COTA/L 01/05/20 1432      Row Name 01/05/20 0825             BADL Safety/Performance    Impairments, BADL Safety/Performance  balance;cognition;endurance/activity tolerance;grasp/prehension;motor control;range of motion;strength  -LW      Recorded by [LW] Carmella Saucedo COTA/L 01/05/20 1432      Row Name 01/05/20 0825             Positioning and Restraints    Pre-Treatment Position  in bed  -LW      Post Treatment Position  chair  -LW      In Chair  notified nsg;sitting;call light within reach;encouraged to call for assist;exit alarm on  -LW      Recorded by [LW] Carmella Saucedo COTA/NOLA 01/05/20 1432       Row Name 01/05/20 0825             Pain Scale: Numbers Pre/Post-Treatment    Pain Scale: Numbers, Pretreatment  0/10 - no pain  -LW      Pain Scale: Numbers, Post-Treatment  0/10 - no pain  -LW      Recorded by [LW] Carmella Saucedo COTA/L 01/05/20 1432      Row Name                Wound 01/01/20 1200 Left anterior toe Abrasion    Wound - Properties Group Date first assessed: 01/01/20 [AK] Time first assessed: 1200 [AK] Present on Hospital Admission: Y [AK] Side: Left [AK] Orientation: anterior [AK] Location: toe [AK], 2nd toe   Primary Wound Type: Abrasion [AK], toe nail removed traumatically   Recorded by:  [AK] Marisel Christensen RN 01/01/20 1356    Row Name 01/05/20 0825             Coping    Observed Emotional State  accepting;calm;cooperative  -LW      Verbalized Emotional State  acceptance  -LW      Recorded by [LW] Carmella Saucedo COTA/L 01/05/20 1432      Row Name 01/05/20 0825             Plan of Care Review    Plan of Care Reviewed With  patient  -LW      Progress  improving  -LW      Recorded by [LW] Carmella Saucedo COTA/L 01/05/20 1432      Row Name 01/05/20 0825             Outcome Summary/Treatment Plan (OT)    Daily Summary of Progress (OT)  progress toward functional goals is good  -LW      Plan for Continued Treatment (OT)  Continue POC  -LW      Anticipated Discharge Disposition (OT)  anticipate therapy at next level of care  -LW      Recorded by [LW] Carmella Saucedo COTA/NOLA 01/05/20 1432        User Key  (r) = Recorded By, (t) = Taken By, (c) = Cosigned By    Initials Name Effective Dates Discipline    AK Marisel Christensen RN 10/17/16 -  Nurse    LW Carmella Saucedo COTA/L 03/07/18 -  OT        Wound 01/01/20 1200 Left anterior toe Abrasion (Active)   Dressing Appearance open to air 1/5/2020  7:12 AM   Closure Open to air 1/5/2020  7:12 AM   Base maroon/purple 1/5/2020  7:12 AM   [place] redness 1/5/2020  7:12 AM   [place] Temperature cool 1/5/2020  7:12 AM   [place] Skin Turgor soft  1/5/2020  7:12 AM   Drainage Amount none 1/5/2020  7:12 AM     Rehab Goal Summary     Row Name 01/05/20 0825             Occupational Therapy Goals    Transfer Goal Selection (OT)  transfer, OT goal 1  -LW      Bathing Goal Selection (OT)  bathing, OT goal 1  -LW      Dressing Goal Selection (OT)  dressing, OT goal 1  -LW      Toileting Goal Selection (OT)  toileting, OT goal 1  -LW      Grooming Goal Selection (OT)  grooming, OT goal 1  -LW      Self-Feeding Goal Selection (OT)  self feeding, OT goal 1  -LW      Functional Mobility Goal Selection (OT)  functional mobility, OT goal 1  -LW      Coordination Goal Selection (OT)  coordination, OT goal 1;coordination, OT goal 2  -LW         Transfer Goal 1 (OT)    Activity/Assistive Device (Transfer Goal 1, OT)  toilet  -LW      [place] Level/Cues Needed (Transfer Goal 1, OT)  standby assist  -LW      Time Frame (Transfer Goal 1, OT)  long term goal (LTG);by discharge  -LW      Progress/Outcome (Transfer Goal 1, OT)  goal not met  -LW         Bathing Goal 1 (OT)    Activity/Assistive Device (Bathing Goal 1, OT)  bathing skills, all  -LW      [place] Level/Cues Needed (Bathing Goal 1, OT)  set-up required;verbal cues required;standby assist  -LW      Time Frame (Bathing Goal 1, OT)  long term goal (LTG);by discharge  -LW      Progress/Outcomes (Bathing Goal 1, OT)  (S) goal met  -LW         Dressing Goal 1 (OT)    Activity/Assistive Device (Dressing Goal 1, OT)  dressing skills, all  -LW      [place]/Cues Needed (Dressing Goal 1, OT)  set-up required;verbal cues required;standby assist  -LW      Time Frame (Dressing Goal 1, OT)  long term goal (LTG);by discharge  -LW      Progress/Outcome (Dressing Goal 1, OT)  (S) goal met  -LW         Toileting Goal 1 (OT)    Activity/Device (Toileting Goal 1, OT)  toileting skills, all  -LW      [place] Level/Cues Needed (Toileting Goal 1, OT)  standby assist  -LW      Time Frame (Toileting Goal 1, OT)  long term  goal (LTG);by discharge  -LW      Progress/Outcome (Toileting Goal 1, OT)  goal not met  -LW         Grooming Goal 1 (OT)    Activity/Device (Grooming Goal 1, OT)  grooming skills, all  -LW      [place] (Grooming Goal 1, OT)  set-up required;verbal cues required;standby assist  -LW      Time Frame (Grooming Goal 1, OT)  long term goal (LTG);by discharge  -LW      Progress/Outcome (Grooming Goal 1, OT)  goal not met  -LW         Self-Feeding Goal 1 (OT)    Activity/Assistive Device (Self-Feeding Goal 1, OT)  self-feeding skills, all  -LW      [place] Level/Cues Needed (Self-Feeding Goal 1, OT)  set-up required;verbal cues required;standby assist  -LW      Time Frame (Self-Feeding Goal 1, OT)  long term goal (LTG);by discharge  -LW      Progress/Outcomes (Self-Feeding Goal 1, OT)  goal not met  -LW         Functional Mobility Goal 1 (OT)    Activity/Assistive Device (Functional Mobility Goal 1, OT)  -- AD as appropriate   -LW      [place] Level/Cues Needed (Functional Mobility Goal 1, OT)  standby assist  -LW      Distance Goal 1 (Functional Mobility, OT)  for ADL tasks no LOB and good safety  -LW      Time Frame (Functional Mobility Goal 1, OT)  long term goal (LTG);by discharge  -LW      Progress/Outcome (Functional Mobility Goal 1, OT)  goal not met  -LW         Coordination Goal 1 (OT)    Activity/Assistive Device (Coordination Goal 1, OT)  FM task BUE fair coordination   -LW      [place] Level/Cues Needed (Coordination Goal 1, OT)  set-up required;standby assist  -LW      Time Frame (Coordination Goal 1, OT)  long term goal (LTG);by discharge  -LW      Progress/Outcomes (Coordination Goal 1, OT)  goal met continue  -LW         Coordination Goal 2 (OT)    Activity/Assistive Device (Coordination Goal 2, OT)  GM task BUE fair coordination   -LW      [place] Level/Cues Needed (Coordination Goal 2, OT)  standby assist;set-up required  -LW      Time Frame (Coordination Goal 2, OT)  long term  goal (LTG);by discharge  -LW      Progress/Outcomes (Coordination Goal 2, OT)  goal not met  -LW        User Key  (r) = Recorded By, (t) = Taken By, (c) = Cosigned By    Initials Name Provider Type Discipline    Carmella Marrero COTA/L Occupational Therapy Assistant OT            OT Recommendation and Plan  Outcome Summary/Treatment Plan (OT)  Daily Summary of Progress (OT): progress toward functional goals is good  Plan for Continued Treatment (OT): Continue POC  Anticipated Discharge Disposition (OT): anticipate therapy at next level of care  Therapy Frequency (OT Eval): daily  Daily Summary of Progress (OT): progress toward functional goals is good  Plan of Care Review  Plan of Care Reviewed With: patient  Plan of Care Reviewed With: patient  Outcome Summary: Pt performed UB and LB bathing with CGA and setup. Pt performed grooming with Min A. Feeding with built up utencil Min A. Pt needing increased time vc's to keep mind on task.  Outcome Measures     Row Name 01/05/20 0825 01/04/20 0857 01/03/20 1500       How much help from another is currently needed...    Putting on and taking off regular lower body clothing?  2  -LW  2  -SUNIL  2  -CS    Bathing (including washing, rinsing, and drying)  2  -LW  2  -SUNIL  2  -CS    Toileting (which includes using toilet bed pan or urinal)  2  -LW  2  -SUNIL  2  -CS    Putting on and taking off regular upper body clothing  2  -LW  2  -SUNIL  2  -CS    Taking care of personal grooming (such as brushing teeth)  2  -LW  2  -SUNIL  2  -CS    Eating meals  2  -LW  2  -SUNIL  2  -CS    AM-PAC 6 Clicks Score (OT)  12  -LW  12  -SUNIL  12  -CS       Functional Assessment    Outcome Measure Options  --  AM-PAC 6 Clicks Daily Activity (OT)  -SUNIL  --      User Key  (r) = Recorded By, (t) = Taken By, (c) = Cosigned By    Initials Name Provider Type    CS Simi Rodriguez COTA/NOLA Occupational Therapy Assistant    Carmella Marrero COTA/L Occupational Therapy Assistant    Genet Saleem OTA  Occupational Therapy Assistant           Time Calculation:   Time Calculation- OT     Row Name 01/05/20 1438             Time Calculation- OT    OT Start Time  0825  -LW      OT Stop Time  0950  -LW      OT Time Calculation (min)  85 min  -LW      Total Timed Code Minutes- OT  85 minute(s)  -LW      OT Received On  01/05/20  -LW        User Key  (r) = Recorded By, (t) = Taken By, (c) = Cosigned By    Initials Name Provider Type     Carmella Saucedo COTA/L Occupational Therapy Assistant        Therapy Charges for Today     Code Description Service Date Service Provider Modifiers Qty    [id number]  OT SELF CARE/MGMT/TRAIN EA 15 MIN 1/5/2020 Carmella Saucedo COTA/L GO 6        Non-skid socks and gait belt in place. Toileting offered. Call light and needs within reach. Pt advised to not get up alone and call the nurse for assistance.  Chair alarm on.          ANTNOIO Agarwal  1/5/2020

## 2020-01-06 VITALS
BODY MASS INDEX: 18.25 KG/M2 | HEART RATE: 71 BPM | OXYGEN SATURATION: 98 % | DIASTOLIC BLOOD PRESSURE: 59 MMHG | WEIGHT: 137.7 LBS | SYSTOLIC BLOOD PRESSURE: 121 MMHG | RESPIRATION RATE: 16 BRPM | TEMPERATURE: 97.4 F | HEIGHT: 73 IN

## 2020-01-06 LAB
BACTERIA SPEC AEROBE CULT: NORMAL
BACTERIA SPEC AEROBE CULT: NORMAL

## 2020-01-06 PROCEDURE — 97116 GAIT TRAINING THERAPY: CPT

## 2020-01-06 PROCEDURE — 97535 SELF CARE MNGMENT TRAINING: CPT

## 2020-01-06 PROCEDURE — 97112 NEUROMUSCULAR REEDUCATION: CPT

## 2020-01-06 PROCEDURE — 94799 UNLISTED PULMONARY SVC/PX: CPT

## 2020-01-06 PROCEDURE — 94760 N-INVAS EAR/PLS OXIMETRY 1: CPT

## 2020-01-06 RX ORDER — ASPIRIN 81 MG/1
81 TABLET, CHEWABLE ORAL DAILY
Status: CANCELLED
Start: 2020-01-07

## 2020-01-06 RX ORDER — PSEUDOEPHEDRINE HCL 30 MG
100 TABLET ORAL DAILY
Start: 2020-01-07 | End: 2023-01-31

## 2020-01-06 RX ORDER — ATORVASTATIN CALCIUM 40 MG/1
40 TABLET, FILM COATED ORAL NIGHTLY
Start: 2020-01-06 | End: 2023-01-31

## 2020-01-06 RX ORDER — ATENOLOL 25 MG/1
25 TABLET ORAL DAILY
Start: 2020-01-07 | End: 2023-01-31

## 2020-01-06 RX ORDER — GABAPENTIN 100 MG/1
200 CAPSULE ORAL 3 TIMES DAILY
Qty: 9 CAPSULE | Refills: 0 | Status: SHIPPED | OUTPATIENT
Start: 2020-01-06 | End: 2023-01-31

## 2020-01-06 RX ADMIN — CLOPIDOGREL BISULFATE 75 MG: 75 TABLET ORAL at 08:33

## 2020-01-06 RX ADMIN — DOXYCYCLINE 100 MG: 100 CAPSULE ORAL at 08:33

## 2020-01-06 RX ADMIN — BUDESONIDE AND FORMOTEROL FUMARATE DIHYDRATE 2 PUFF: 160; 4.5 AEROSOL RESPIRATORY (INHALATION) at 09:41

## 2020-01-06 RX ADMIN — SODIUM CHLORIDE, PRESERVATIVE FREE 10 ML: 5 INJECTION INTRAVENOUS at 08:32

## 2020-01-06 RX ADMIN — GABAPENTIN 200 MG: 100 CAPSULE ORAL at 08:33

## 2020-01-06 RX ADMIN — DOCUSATE SODIUM 100 MG: 100 CAPSULE, LIQUID FILLED ORAL at 08:34

## 2020-01-06 RX ADMIN — ATENOLOL 25 MG: 25 TABLET ORAL at 08:33

## 2020-01-06 RX ADMIN — ASPIRIN 81 MG 81 MG: 81 TABLET ORAL at 08:33

## 2020-01-06 RX ADMIN — SERTRALINE HYDROCHLORIDE 100 MG: 50 TABLET ORAL at 08:33

## 2020-01-06 NOTE — PLAN OF CARE
Problem: Patient Care Overview  Goal: Plan of Care Review  1/6/2020 1536 by Naz Bauer PT  Outcome: Unable to achieve outcome(s) by discharge  Flowsheets  Taken 1/6/2020 1536  Plan of Care Reviewed With: patient  Taken 1/6/2020 1037  Outcome Summary: PT treatment complete today. Pt ambulated 172'x2 with at least 3 LOB requiring Joseph to regain balance and prevent fall. He continues to favor the right side by leaning towards the right, walking towards the right, more WS on the right (decreased on L), and FWRW points towards the R. Pt apears to have a visual feild deficit as his peripheral vision range on the L is less when compaired to the R. Pt tolerated treatmetn well which include gait mechanics, sitting balance, LE strengthening, and transfers. Pt would benefit from continued skilled PT.     
  Problem: Patient Care Overview  Goal: Plan of Care Review  Flowsheets  Taken 1/3/2020 1604 by Ben Fernandes, RN  Progress: no change  Taken 1/3/2020 1934 by Dilcia Fernandes, RN  Plan of Care Reviewed With: patient     
  Problem: Patient Care Overview  Goal: Plan of Care Review  Flowsheets  Taken 1/5/2020 0247  Progress: no change  Taken 1/4/2020 1941  Plan of Care Reviewed With: patient     
  Problem: Patient Care Overview  Goal: Plan of Care Review  Flowsheets (Taken 1/2/2020 8009)  Plan of Care Reviewed With: patient  Note:   PT evaluation completed as co-eval with OT. Pt transfers with min assistance and ambulated 10ft and 20ft with min assistance of 1 plus 1 for equipment. Pt required frequent tactile and verbal cues for sequencing and safe use 0f RW including placing L hand back on  as pt had dificulty grasping RW. Function limited by decreased strength, balance, safety awareness and tolerance for functional mobility and activities. Pt will benefit from PT to regain lost function. Anticipate need for skilled PT at next level of care. PT recommends ARU prior to return home for stroke rehab. If pt discharged home prior to goals being met,pt would require 24/7 assistance and continued therapy services.     
  Problem: Patient Care Overview  Goal: Plan of Care Review  Outcome: Ongoing (interventions implemented as appropriate)  Flowsheets  Taken 1/2/2020 1609 by Mariesl Urias, RN  Progress: improving  Outcome Summary: STACY today, normal; awaiting ARU  Taken 1/2/2020 1555 by Luci Macias PT  Plan of Care Reviewed With: patient     
  Problem: Patient Care Overview  Goal: Plan of Care Review  Outcome: Ongoing (interventions implemented as appropriate)  Flowsheets  Taken 1/3/2020 1418  Plan of Care Reviewed With: patient  Taken 1/3/2020 1017  Outcome Summary: PT treatment completed. Pt was in bed upon arrival. When asked to perform toe taps, he completed 16 in 30 sec and was unable to maintain rhythem and speed with multiple verbal and visual cuing. Pt was able to ambulate 150ft with CGA-Joseph on R & mod assist of 1 on L using FWRW. He had at > 2 LOBs requiring min-mod assistance x2 to prevent fall. His gait is unsteady and he drifts towards the right and has difficulty holding on to FWRW with L hand. Pt appears to be unaware of safety concerns and near falls. PT will continue to follow to address coordination, balance, and gait. 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 May be best if he and wife can have placement?/ possible care together in an SNF/rehab to home type environment before resuming caregiving at home for each other.      
  Problem: Patient Care Overview  Goal: Plan of Care Review  Outcome: Ongoing (interventions implemented as appropriate)  Flowsheets  Taken 1/3/2020 1551 by Simi Rodriguez, АНДРЕЙ/L  Progress: improving  Outcome Summary: Pt tolerated tx fair this date. Pt was cod I with bed mobility. Pt had max difficulty with placing large pegs in the board. Pt gave fair effort with UE ther ex. No goals met this tx. Continue OT POC.  Taken 1/3/2020 1418 by Naz Bauer, PT  Plan of Care Reviewed With: patient     
  Problem: Patient Care Overview  Goal: Plan of Care Review  Outcome: Ongoing (interventions implemented as appropriate)  Flowsheets  Taken 1/5/2020 1435 by Carmella Saucedo COTA/L  Progress: improving  Taken 1/5/2020 1631 by Ben Fernandes RN  Plan of Care Reviewed With: patient  Outcome Summary: PT/OT today. VSS. Pt anticipating discharge to Rehab facility on Monday.     
  Problem: Patient Care Overview  Goal: Plan of Care Review  Outcome: Ongoing (interventions implemented as appropriate)  Flowsheets  Taken 1/5/2020 1435 by Carmella Saucedo, CHIANG/L  Plan of Care Reviewed With: patient  Taken 1/5/2020 1618 by Maximilian Noriega, PTA  Outcome Summary: Pt lee tx well with good participation. Pt t/f sit-stand-sit with SBAx1. Sit-sup with SBAx1. Pt amb 210' with FWRW with CGAx1 with slight unsteadiness noted.     
  Problem: Patient Care Overview  Goal: Plan of Care Review  Outcome: Ongoing (interventions implemented as appropriate)  Flowsheets  Taken 1/6/2020 0300 by Dilcia Fernandes, RN  Progress: improving  Taken 1/5/2020 1924 by Dilcia Feranndes, RN  Plan of Care Reviewed With: patient  Taken 1/5/2020 1631 by Ben Fernandes RN  Outcome Summary: PT/OT today. VSS. Pt anticipating discharge to Rehab facility on Monday.     
  Problem: Patient Care Overview  Goal: Plan of Care Review  Outcome: Ongoing (interventions implemented as appropriate)  Flowsheets (Taken 1/1/2020 1642)  Progress: no change  Plan of Care Reviewed With: patient  Outcome Summary: ct and mri (+) stroke, symptoms have improved since admission, complete bedrest per neuro     
  Problem: Patient Care Overview  Goal: Plan of Care Review  Outcome: Ongoing (interventions implemented as appropriate)  Flowsheets (Taken 1/2/2020 1033)  Outcome Summary: OT ricco completed this date, co-eval with PT. Pt was pleasant and engaged well. Pt displayed max difficulty with LUE control, manipulation, sensation, grasp and release. Pt was min assist for sup to sit and min assist for sit to stand off toilet and bed and for mobility with RW but pt unable to accurately grasp and use RW with LUE. Pt unsteady and off balance. Pt min assist and extended time to don socks sitting up in the bed. Pt could benefit from further skilled OT to reach PLOF/max independence with ADL including function in LUE. Recommend to d/c to inpatient rehab.     
  Problem: Patient Care Overview  Goal: Plan of Care Review  Outcome: Ongoing (interventions implemented as appropriate)  Flowsheets (Taken 1/2/2020 113)  Progress: improving  Plan of Care Reviewed With: patient  Outcome Summary: speech/swallow evaluation: pt is a good historian.  he is edentulous, but states his speech / communication has not been affected by recent CVA.  he reports left hand weakness only.  no f/u needed at this time.     
  Problem: Patient Care Overview  Goal: Plan of Care Review  Outcome: Ongoing (interventions implemented as appropriate)  Flowsheets (Taken 1/2/2020 2086)  Progress: no change  Plan of Care Reviewed With: patient  Outcome Summary: initial assessment  Note:   Encourage intake of meals and supplement.     
  Problem: Patient Care Overview  Goal: Plan of Care Review  Outcome: Ongoing (interventions implemented as appropriate)  Flowsheets (Taken 1/3/2020 7530)  Progress: no change  Plan of Care Reviewed With: patient  Outcome Summary: Pt VSS, no c/o pain or discomfort. PT/OT today. Anticipates going to [facility] Monday per CM.     
  Problem: Patient Care Overview  Goal: Plan of Care Review  Outcome: Ongoing (interventions implemented as appropriate)  Flowsheets (Taken 1/4/2020 1230)  Outcome Summary: Pt deferred ADL's but agreed to FM ax. Pt. performed tip to tip finger exercised and FM ax with use of peg board. Pt completed 25 pegs on this date with vc's and extended time needed to stay on task. Pt recieved foam to build up handles on eating utensils with explination of use. Pt stated that built up handles would be beneficial and agreed to use.     
  Problem: Patient Care Overview  Goal: Plan of Care Review  Outcome: Ongoing (interventions implemented as appropriate)  Flowsheets (Taken 1/4/2020 1240)  Progress: improving  Plan of Care Reviewed With: patient  Outcome Summary: Pt states he is feeling better today. Able to use his fork much better with the handle that OT gave him. VSS. No c/o pain or discomfort. Anticipating discharge on Monday to [facility].     
  Problem: Patient Care Overview  Goal: Plan of Care Review  Outcome: Ongoing (interventions implemented as appropriate)  Flowsheets (Taken 1/5/2020 9814)  Progress: improving  Plan of Care Reviewed With: patient  Outcome Summary: Pt performed UB and LB bathing with CGA and setup. Pt performed grooming with Min A. Feeding with built up utencil Min A. Pt needing increased time vc's to keep mind on task.     
Ears: no ear pain and no hearing problems.Nose: no nasal congestion and no nasal drainage.Mouth/Throat: no dysphagia, no hoarseness and no throat pain.Neck: no lumps, no pain, no stiffness and no swollen glands.

## 2020-01-06 NOTE — THERAPY TREATMENT NOTE
Acute Care - Occupational Therapy Treatment Note  [facility]     Patient Name: Aubrey Montilla  : 1955  MRN: [id number]  Today's Date: 2020  Onset of Illness/Injury or Date of Surgery: 20  Date of Referral to OT: 20  Referring Physician: Dr. Pratt    Admit Date: 2020       ICD-10-CM ICD-9-CM   1. Cerebrovascular accident (CVA), unspecified mechanism (CMS/HCC) I63.9 434.91   2. Symbolic dysfunction R48.9 784.60   3. Impaired mobility and ADLs Z74.09 799.89   4. Impaired functional mobility, balance, gait, and endurance Z74.09 V49.89     Patient Active Problem List   Diagnosis   • Cerebrovascular accident (CVA) (CMS/HCC)   • Hypertension   • Elevated cholesterol     Past Medical History:   Diagnosis Date   • Elevated cholesterol    • Hypertension      Past Surgical History:   Procedure Laterality Date   • ENDOSCOPY N/A 2017    Procedure: ESOPHAGOGASTRODUODENOSCOPY;  Surgeon: Van Khoury DO;  Location: [facility] ENDOSCOPY;  Service:    • ENDOSCOPY N/A 2019    Procedure: ESOPHAGOGASTRODUODENOSCOPY;  Surgeon: Van Khoury DO;  Location: [facility] ENDOSCOPY;  Service: Gastroenterology       Therapy Treatment    Rehabilitation Treatment Summary     Row Name 20 0818             Treatment Time/Intention    Discipline  occupational therapy assistant  -BB      Document Type  therapy note (daily note)  -BB      Subjective Information  no complaints  -BB      Mode of Treatment  individual therapy;occupational therapy  -BB      Patient/Family Observations  No family present  -BB      Total Minutes, Occupational Therapy Treatment  39  -BB      Therapy Frequency (OT Eval)  daily  -BB      Patient Effort  good  -BB      Existing Precautions/Restrictions  fall  -BB      Recorded by [BB] Romelia Kurtz COTA/L 20 1407      Row Name 20 0818             Vital Signs    Pre Systolic BP Rehab  121  -BB      Pre Treatment Diastolic BP  59  -BB      Pretreatment  Heart Rate (beats/min)  84  -BB      Posttreatment Heart Rate (beats/min)  76  -BB      Pre SpO2 (%)  92  -BB      O2 Delivery Pre Treatment  room air  -BB      Post SpO2 (%)  94  -BB      O2 Delivery Post Treatment  room air  -BB      Pre Patient Position  -- long sitting  -BB      Post Patient Position  -- long sitting  -BB      Recorded by [BB] Romelia Kurtz COTA/L 01/06/20 1407      Row Name 01/06/20 0818             Cognitive Assessment/Intervention- PT/OT    Affect/Mental Status (Cognitive)  WFL  -BB      Orientation Status (Cognition)  oriented x 4  -BB      Personal Safety Interventions  fall prevention program maintained;gait belt;nonskid shoes/slippers when out of bed;supervised activity  -BB      Recorded by [BB] Romelia Kurtz COTA/L 01/06/20 1407      Row Name 01/06/20 0818             Bed Mobility Assessment/Treatment    Supine-Sit [place] (Bed Mobility)  conditional independence  -BB      Sit-Supine [place] (Bed Mobility)  conditional independence  -BB      Assistive Device (Bed Mobility)  head of bed elevated;bed rails  -BB      Recorded by [BB] Romelia Kurtz COTA/L 01/06/20 1407      Row Name 01/06/20 0818             Grooming Assessment/Training    [place] Level (Grooming)  grooming skills;hair care, combing/brushing;oral care regimen;wash face, hands;set up;conditional independence  -BB      Grooming Position  edge of bed sitting  -BB      Recorded by [BB] Romelia Kurtz COTA/L 01/06/20 1407      Row Name 01/06/20 0818             Self-Feeding Assessment/Training    [place] Level (Feeding)  liquids to mouth;scoop food and bring to mouth;contact guard assist  -BB      Assistive Devices (Feeding)  built-up handle utensils  -BB      Self-Feeding Assess/Train, Spillage Amount  minimal  -BB      Position (Self-Feeding)  sitting up in bed  -BB      Recorded by [BB] Romelia Kurtz COTA/L 01/06/20 1407      Row Name 01/06/20 0818             Positioning  and Restraints    Pre-Treatment Position  sitting in chair/recliner  -BB      Post Treatment Position  bed  -BB      In Bed  call light within reach;encouraged to call for assist;exit alarm on long sitting  -BB      Recorded by [HAKAN] Romelia Kurtz COTA/L 01/06/20 1407      Row Name 01/06/20 0818             Pain Scale: Numbers Pre/Post-Treatment    Pain Scale: Numbers, Pretreatment  0/10 - no pain  -BB      Pain Scale: Numbers, Post-Treatment  0/10 - no pain  -BB      Recorded by [HAKAN] Romelia Kurtz COTA/L 01/06/20 1407      Row Name                Wound 01/01/20 1200 Left anterior toe Abrasion    Wound - Properties Group Date first assessed: 01/01/20 [AK] Time first assessed: 1200 [AK] Present on Hospital Admission: Y [AK] Side: Left [AK] Orientation: anterior [AK] Location: toe [AK], 2nd toe   Primary Wound Type: Abrasion [AK], toe nail removed traumatically   Recorded by:  [AK] Marisel Christensen RN 01/01/20 1356    Row Name 01/06/20 0818             Outcome Summary/Treatment Plan (OT)    Daily Summary of Progress (OT)  progress toward functional goals is good  -BB      Plan for Continued Treatment (OT)  continue POC  -BB      Anticipated Discharge Disposition (OT)  anticipate therapy at next level of care  -BB      Recorded by [HAKAN] Romelia Kurtz COTA/L 01/06/20 1407        User Key  (r) = Recorded By, (t) = Taken By, (c) = Cosigned By    Initials Name Effective Dates Discipline    AK Mairsel Christensen RN 10/17/16 -  Nurse    BB Romelia Kurtz COTA/L 03/07/18 -  OT        Wound 01/01/20 1200 Left anterior toe Abrasion (Active)   Dressing Appearance open to air 1/6/2020  7:15 AM   Closure Open to air 1/6/2020  7:15 AM   Base maroon/purple 1/6/2020  7:15 AM   Periwound redness 1/6/2020  7:15 AM   Periwound Temperature cool 1/6/2020  7:15 AM   [place]iwound Skin Turgor soft 1/6/2020  7:15 AM   Edges irregular 1/6/2020  7:15 AM   Drainage Amount none 1/6/2020  7:15 AM     Rehab Goal Summary     Row  Name 01/06/20 1037 01/06/20 0818          Bed Mobility Goal 1 (PT)    Activity/Assistive Device (Bed Mobility Goal 1, PT)  sit to supine;supine to sit  -GB  --     [place] Level/Cues Needed (Bed Mobility Goal 1, PT)  independent  -GB  --     Time Frame (Bed Mobility Goal 1, PT)  10 days  -GB  --     Progress/Outcomes (Bed Mobility Goal 1, PT)  goal not met;good progress toward goal  -GB  --        Transfer Goal 1 (PT)    Activity/Assistive Device (Transfer Goal 1, PT)  sit-to-stand/stand-to-sit;bed-to-chair/chair-to-bed;toilet  -GB  --     [place] Level/Cues Needed (Transfer Goal 1, PT)  conditional independence  -GB  --     Time Frame (Transfer Goal 1, PT)  2 weeks  -GB  --     Progress/Outcome (Transfer Goal 1, PT)  goal not met;good progress toward goal  -GB  --        Gait Training Goal 1 (PT)    Activity/Assistive Device (Gait Training Goal 1, PT)  assistive device use;gait (walking locomotion)  -GB  --     [place] Level (Gait Training Goal 1, PT)  independent  -GB  --     Distance (Gait Goal 1, PT)  200 ft if  home w 24/7 caregiver, 1000 ft if home as primary caregiver, w/ or w/out AD.   -GB  --     Time Frame (Gait Training Goal 1, PT)  long term goal (LTG);3 weeks  -GB  --     Progress/Outcome (Gait Training Goal 1, PT)  goal not met;good progress toward goal  -GB  --        Balance Goal 1 (PT)    Activity/Assistive Device (Balance Goal 1, PT)  standing, static;standing, dynamic  -GB  --     [place] Level/Cues Needed (Balance Goal 1, PT)  contact guard assist  -GB  --     Time Frame (Balance Goal 1, PT)  5 days  -GB  --     Progress/Outcomes (Balance Goal 1, PT)  goal not met;good progress toward goal  -GB  --        Stairs Goal 1 (PT)    Activity/Assistive Device (Stairs Goal 1, PT)  ascending stairs;descending stairs  -GB  --     [place] Level/Cues Needed (Stairs Goal 1, PT)  conditional independence;independent  -GB  --     Number of Stairs (Stairs Goal 1, PT)  4  -GB  --     Time  Frame (Stairs Goal 1, PT)  3 weeks  -GB  --     Progress/Outcome (Stairs Goal 1, PT)  goal not met  -GB  --        Patient Education Goal (PT)    Activity (Patient Education Goal, PT)  pt will demonstrate safety techniques in mobility and self care before considering d/c to home unless capable caregiver available on consistent basis  -GB  --     [place]/Cues/Accuracy (Memory Goal 2, PT)  demonstrates adequately  -GB  --     Time Frame (Patient Education Goal, PT)  long term goal (LTG);by discharge  -GB  --     Progress/Outcome (Patient Education Goal, PT)  goal not met  -GB  --        Occupational Therapy Goals    Transfer Goal Selection (OT)  --  transfer, OT goal 1  -BB     Bathing Goal Selection (OT)  --  bathing, OT goal 1  -BB     Dressing Goal Selection (OT)  --  dressing, OT goal 1  -BB     Toileting Goal Selection (OT)  --  toileting, OT goal 1  -BB     Grooming Goal Selection (OT)  --  grooming, OT goal 1  -BB     Self-Feeding Goal Selection (OT)  --  self feeding, OT goal 1  -BB     Functional Mobility Goal Selection (OT)  --  functional mobility, OT goal 1  -BB     Coordination Goal Selection (OT)  --  coordination, OT goal 1;coordination, OT goal 2  -BB        Transfer Goal 1 (OT)    Activity/Assistive Device (Transfer Goal 1, OT)  --  toilet  -BB     [place] Level/Cues Needed (Transfer Goal 1, OT)  --  standby assist  -BB     Time Frame (Transfer Goal 1, OT)  --  long term goal (LTG);by discharge  -BB     Progress/Outcome (Transfer Goal 1, OT)  --  goal not met  -BB        Bathing Goal 1 (OT)    Activity/Assistive Device (Bathing Goal 1, OT)  --  bathing skills, all  -BB     [place] Level/Cues Needed (Bathing Goal 1, OT)  --  set-up required;verbal cues required;standby assist  -BB     Time Frame (Bathing Goal 1, OT)  --  long term goal (LTG);by discharge  -BB     Progress/Outcomes (Bathing Goal 1, OT)  --  goal met  -BB        Dressing Goal 1 (OT)    Activity/Assistive Device (Dressing  Goal 1, OT)  --  dressing skills, all  -BB     [place]/Cues Needed (Dressing Goal 1, OT)  --  set-up required;verbal cues required;standby assist  -BB     Time Frame (Dressing Goal 1, OT)  --  long term goal (LTG);by discharge  -BB     Progress/Outcome (Dressing Goal 1, OT)  --  goal met  -BB        Toileting Goal 1 (OT)    Activity/Device (Toileting Goal 1, OT)  --  toileting skills, all  -BB     [place] Level/Cues Needed (Toileting Goal 1, OT)  --  standby assist  -BB     Time Frame (Toileting Goal 1, OT)  --  long term goal (LTG);by discharge  -BB     Progress/Outcome (Toileting Goal 1, OT)  --  goal not met  -BB        Grooming Goal 1 (OT)    Activity/Device (Grooming Goal 1, OT)  --  grooming skills, all  -BB     [place] (Grooming Goal 1, OT)  --  set-up required;verbal cues required;standby assist  -BB     Time Frame (Grooming Goal 1, OT)  --  long term goal (LTG);by discharge  -BB     Progress/Outcome (Grooming Goal 1, OT)  --  goal not met  -BB        Self-Feeding Goal 1 (OT)    Activity/Assistive Device (Self-Feeding Goal 1, OT)  --  self-feeding skills, all  -BB     [place] Level/Cues Needed (Self-Feeding Goal 1, OT)  --  set-up required;verbal cues required;standby assist  -BB     Time Frame (Self-Feeding Goal 1, OT)  --  long term goal (LTG);by discharge  -BB     Progress/Outcomes (Self-Feeding Goal 1, OT)  --  goal not met  -BB        Functional Mobility Goal 1 (OT)    Activity/Assistive Device (Functional Mobility Goal 1, OT)  --  -- AD as appropriate   -BB     [place] Level/Cues Needed (Functional Mobility Goal 1, OT)  --  standby assist  -BB     Distance Goal 1 (Functional Mobility, OT)  --  for ADL tasks no LOB and good safety  -BB     Time Frame (Functional Mobility Goal 1, OT)  --  long term goal (LTG);by discharge  -BB     Progress/Outcome (Functional Mobility Goal 1, OT)  --  goal not met  -BB        Coordination Goal 1 (OT)    Activity/Assistive Device (Coordination  Goal 1, OT)  --  FM task BUE fair coordination   -BB     [place] Level/Cues Needed (Coordination Goal 1, OT)  --  set-up required;standby assist  -BB     Time Frame (Coordination Goal 1, OT)  --  long term goal (LTG);by discharge  -BB     Progress/Outcomes (Coordination Goal 1, OT)  --  goal met continue  -BB        Coordination Goal 2 (OT)    Activity/Assistive Device (Coordination Goal 2, OT)  --  GM task BUE fair coordination   -BB     [place] Level/Cues Needed (Coordination Goal 2, OT)  --  standby assist;set-up required  -BB     Time Frame (Coordination Goal 2, OT)  --  long term goal (LTG);by discharge  -BB     Progress/Outcomes (Coordination Goal 2, OT)  --  goal not met  -BB       User Key  (r) = Recorded By, (t) = Taken By, (c) = Cosigned By    Initials Name Provider Type Discipline    GB Naz Bauer, PT Physical Therapist PT    BB Romelia Kurtz, АНДРЕЙ/L Occupational Therapy Assistant OT         Non-skid socks and gait belt in place. Toileting offered. Call light and needs within reach. Pt advised to not get up alone and call the nurse for assistance.  Bed alarm on.       OT Recommendation and Plan  Outcome Summary/Treatment Plan (OT)  Daily Summary of Progress (OT): progress toward functional goals is good  Plan for Continued Treatment (OT): continue POC  Anticipated Discharge Disposition (OT): anticipate therapy at next level of care  Therapy Frequency (OT Eval): daily  Daily Summary of Progress (OT): progress toward functional goals is good  Outcome Measures     Row Name 01/06/20 0818 01/05/20 1425 01/05/20 0825       How much help from another person do you currently need...    Turning from your back to your side while in flat bed without using bedrails?  --  4  -EM  --    Moving from lying on back to sitting on the side of a flat bed without bedrails?  --  4  -EM  --    Moving to and from a bed to a chair (including a wheelchair)?  --  3  -EM  --    Standing up from a chair  using your arms (e.g., wheelchair, bedside chair)?  --  4  -EM  --    Climbing 3-5 steps with a railing?  --  3  -EM  --    To walk in hospital room?  --  3  -EM  --    AM-PAC 6 Clicks Score (PT)  --  21  -EM  --       How much help from another is currently needed...    Putting on and taking off regular lower body clothing?  2  -BB  --  2  -LW    Bathing (including washing, rinsing, and drying)  2  -BB  --  2  -LW    Toileting (which includes using toilet bed pan or urinal)  2  -BB  --  2  -LW    Putting on and taking off regular upper body clothing  2  -BB  --  2  -LW    Taking care of personal grooming (such as brushing teeth)  3  -BB  --  2  -LW    Eating meals  3  -BB  --  2  -LW    AM-PAC 6 Clicks Score (OT)  14  -BB  --  12  -LW       Functional Assessment    Outcome Measure Options  --  AM-PAC 6 Clicks Daily Activity (OT)  -EM  --    Row Name 01/04/20 0857 01/03/20 1500          How much help from another is currently needed...    Putting on and taking off regular lower body clothing?  2  -SUNIL  2  -CS     Bathing (including washing, rinsing, and drying)  2  -SUNIL  2  -CS     Toileting (which includes using toilet bed pan or urinal)  2  -SUNIL  2  -CS     Putting on and taking off regular upper body clothing  2  -SUNIL  2  -CS     Taking care of personal grooming (such as brushing teeth)  2  -SUNIL  2  -CS     Eating meals  2  -SUNIL  2  -CS     AM-PAC 6 Clicks Score (OT)  12  -SUNIL  12  -CS        Functional Assessment    Outcome Measure Options  AM-PAC 6 Clicks Daily Activity (OT)  -SUNIL  --       User Key  (r) = Recorded By, (t) = Taken By, (c) = Cosigned By    Initials Name Provider Type    EM Maximilian Noriega, PTA Physical Therapy Assistant    BB Romelia Kurtz, АНДРЕЙ/L Occupational Therapy Assistant    Simi Aviles COTA/L Occupational Therapy Assistant    Carmella Marrero CHIANG/L Occupational Therapy Assistant    Genet Saleem OTA Occupational Therapy Assistant           Time Calculation:   Time  Calculation- OT     Row Name 01/06/20 1408             Time Calculation- OT    OT Start Time  0818  -BB      OT Stop Time  0857  -      OT Time Calculation (min)  39 min  -      Total Timed Code Minutes- OT  39 minute(s)  -      OT Received On  01/06/20  -        User Key  (r) = Recorded By, (t) = Taken By, (c) = Cosigned By    Initials Name Provider Type     Romelia Kurtz COTA/L Occupational Therapy Assistant        Therapy Charges for Today     Code Description Service Date Service Provider Modifiers Qty    64897048626 HC OT SELF CARE/MGMT/TRAIN EA 15 MIN 1/6/2020 Romelia Kurtz COTA/L GO 3               ANTONIO Weaver  1/6/2020

## 2020-01-06 NOTE — DISCHARGE SUMMARY
[facility] Medicine Services  DISCHARGE SUMMARY       Date of Admission: 1/1/2020  Date of Discharge:  1/6/2020  Primary Care Physician: Lorenzo Mondragon MD    Presenting Problem/History of Present Illness:  Cerebrovascular accident (CVA), unspecified mechanism (CMS/HCC) [I63.9]  Cerebrovascular accident (CVA), unspecified mechanism (CMS/HCC) [I63.9]       Final Discharge Diagnoses:  Active Hospital Problems    Diagnosis   • **Cerebrovascular accident (CVA) (CMS/HCC)   • Hypertension   • Elevated cholesterol       Consults:   Consults     Date and Time Order Name Status Description    1/2/2020 0813 Inpatient Cardiology Consult Completed     1/2/2020 0721 Inpatient Neurology Consult Stroke      1/1/2020 0947 Inpatient Neurology Consult Stroke Completed           Procedures Performed:                 Pertinent Test Results:   Lab Results (last 24 hours)     Procedure Component Value Units Date/Time    Blood Culture - Blood, Arm, Right [[id number]] Collected:  01/01/20 1240    Specimen:  Blood from Arm, Right Updated:  01/05/20 1300     Blood Culture No growth at 4 days    Blood Culture - Blood, Arm, Left [[id number]] Collected:  01/01/20 1101    Specimen:  Blood from Arm, Left Updated:  01/05/20 1115     Blood Culture No growth at 4 days        Imaging Results (All)     Procedure Component Value Units Date/Time    MRI Brain Without Contrast [[id number]] Collected:  01/01/20 1351     Updated:  01/01/20 1511    Narrative:       EXAM DESCRIPTION: MRI BRAIN WO CONTRAST    CLINICAL HISTORY: 64-year-old male stroke syndrome; left-sided  weakness and fall.    COMPARISON: CT head 1/1/2020.    TECHNIQUE: Multisequence multiplanar imaging of the brain is  performed without contrast.      FINDINGS:   Brain Parenchyma:  The craniovertebral junction and the included  cervical cord are unremarkable. No Chiari malformation. Normal  noncontrast appearance of the pituitary and the stalk.  The  midline structures are intact.    No evidence of intracranial hemorrhage, suspicious extra-axial  fluid collections or gradient susceptibility. No midline shift or  mass effect. Redemonstration of bilateral remote lacunar infarcts  involving the thalami and the cerebral white matter. The region  of the cerebellopontine angles are unremarkable.    Diffusion: There is an approximate 1.3 x 3.6 cm focus of  restricted diffusion corresponding to the described same day CT  hypodensity predominantly extending inferiorly and posteriorly to  the central sulcus including the postcentral gyrus location.  There is no corresponding gradient susceptibility. The finding is  identified on both T2 and FLAIR sequences.    Atrophy: Redemonstration generalized volume loss with associated  change.    Microvascular Disease: There is moderate sequela of chronic  microvascular disease involving the cerebral white matter.    Vessels: There is signal void within major intracranial vessels.    Extra-axial Spaces: Appropriate for age and degree of volume  loss.    Ventricles: No hydrocephalus.    Bones: No suspicious marrow signal alteration identified.    Sinuses and Mastoids: Trace mastoid effusion. The included  sinuses are clear.    Scalp and Soft Tissues: Unremarkable.     Orbits: Unremarkable.        Impression:       Acute/subacute infarct  on the right corresponding to the  described same day CT hypodensity predominantly extending  posteriorly and inferiorly to the level of the central sulcus  measuring approximately 1.3 x 3.6 cm in its AP and transverse  dimensions respectively. No associated gradient susceptibility or  midline shift.    Redemonstrated moderate sequela of chronic microvascular disease  involving the cerebral white matter and remote lacunar infarcts  involving the thalami and cerebral white matter.    Report of findings given to Marisel GALINDO, nurse caring for this  patient on 1/1/2020 at 2:55 PM CST. She will notify  Dr. Ward of  these results.    Electronically signed by:  Ja Bender MD  1/1/2020 3:10 PM [facility] [[email or web address]]  Workstation: [id number]    CT Angiogram Head [[id number]] Collected:  01/01/20 1036     Updated:  01/01/20 1148    Narrative:       PROCEDURE: CT ANGIOGRAM HEAD AND NECK WITH CONTRAST    Reason for exam: Acute right frontal lobe infarct     Findings: Axial computer tomography sequential imaging was  performed from the superior skull through the thoracic inlet  after dynamic bolus contrast injection. Sagittal and coronal  reformation was performed. This exam was performed according to  our departmental dose optimization program, which includes  automated exposure control, adjustment of the mA and/or KV  according to patient size and/or use of iterative reconstruction  technique. 3-D vascular reconstruction of the [place] as  well as carotid arteries was performed.    CTA findings: The thoracic aortic arch is not imaged and  therefore not evaluated. The left subclavian artery is normal.  The left common carotid artery is normal. The left external  carotid artery is normal. Proximal left internal carotid artery  small sidewall calcified atherosclerotic plaque not  hemodynamically significant. Left internal carotid artery  cavernous sinus small sidewall calcified atherosclerotic plaque  not hemodynamically significant. The left internal carotid artery  is otherwise unremarkable. The visualized brachiocephalic trunk  is normal. The right subclavian artery is normal. The right  common carotid artery is normal. The right external carotid  artery is normal. Proximal right internal carotid artery small  sidewall calcified atherosclerotic plaque not hemodynamically  significant. Distal right internal carotid artery cavernous sinus  small sidewall calcified atherosclerotic plaque not  hemodynamically significant. The right internal carotid artery is  otherwise unremarkable. The right vertebral artery is normal. The  left  vertebral artery is normal. The basilar artery is normal.  Bilateral posterior cerebral arteries are normal. Bilateral  anterior cerebral arteries are normal. Bilateral middle cerebral  arteries are normal.    Other CT findings: No abnormal intracranial enhancement. CT head  findings are unchanged as described on CT head report of same  day. Paranasal sinuses and bilateral mastoid air cells are  well-aerated. Bilateral salivary glands are normal. The  nasopharynx, oropharynx, and larynx are normal. The thyroid gland  is normal. No lymphadenopathy in the region of the neck. No acute  osseous abnormality. Lung apices reveal mild centrilobular  emphysematous changes.      Impression:       1.  CTA findings as described above.    Electronically signed by:  Quinn Triplett MD  1/1/2020 11:47 AM CST  Workstation: [id number]    CT Angiogram Neck [[id number]] Collected:  01/01/20 1036     Updated:  01/01/20 1148    Narrative:       PROCEDURE: CT ANGIOGRAM HEAD AND NECK WITH CONTRAST    Reason for exam: Acute right frontal lobe infarct     Findings: Axial computer tomography sequential imaging was  performed from the superior skull through the thoracic inlet  after dynamic bolus contrast injection. Sagittal and coronal  reformation was performed. This exam was performed according to  our departmental dose optimization program, which includes  automated exposure control, adjustment of the mA and/or KV  according to patient size and/or use of iterative reconstruction  technique. 3-D vascular reconstruction of the [place] as  well as carotid arteries was performed.    CTA findings: The thoracic aortic arch is not imaged and  therefore not evaluated. The left subclavian artery is normal.  The left common carotid artery is normal. The left external  carotid artery is normal. Proximal left internal carotid artery  small sidewall calcified atherosclerotic plaque not  hemodynamically significant. Left internal carotid  artery  cavernous sinus small sidewall calcified atherosclerotic plaque  not hemodynamically significant. The left internal carotid artery  is otherwise unremarkable. The visualized brachiocephalic trunk  is normal. The right subclavian artery is normal. The right  common carotid artery is normal. The right external carotid  artery is normal. Proximal right internal carotid artery small  sidewall calcified atherosclerotic plaque not hemodynamically  significant. Distal right internal carotid artery cavernous sinus  small sidewall calcified atherosclerotic plaque not  hemodynamically significant. The right internal carotid artery is  otherwise unremarkable. The right vertebral artery is normal. The  left vertebral artery is normal. The basilar artery is normal.  Bilateral posterior cerebral arteries are normal. Bilateral  anterior cerebral arteries are normal. Bilateral middle cerebral  arteries are normal.    Other CT findings: No abnormal intracranial enhancement. CT head  findings are unchanged as described on CT head report of same  day. Paranasal sinuses and bilateral mastoid air cells are  well-aerated. Bilateral salivary glands are normal. The  nasopharynx, oropharynx, and larynx are normal. The thyroid gland  is normal. No lymphadenopathy in the region of the neck. No acute  osseous abnormality. Lung apices reveal mild centrilobular  emphysematous changes.      Impression:       1.  CTA findings as described above.    Electronically signed by:  Quinn Triplett MD  1/1/2020 11:47 AM CST  Workstation: [id number]    XR Chest 1 View [[id number]] Collected:  01/01/20 0928     Updated:  01/01/20 0945    Narrative:         PROCEDURE: Single chest view AP    REASON FOR EXAM:Stroke Protocol (Onset > 12 hrs)    FINDINGS: Comparison exam dated October 27, 2015. Cardiac and  pulmonary vasculature are normal. Lungs are clear. Pleural spaces  are normal. No acute osseous abnormality.      Impression:       No acute cardiopulmonary  abnormality.    Electronically signed by:  Quinn Triplett MD  1/1/2020 9:44 AM CST  Workstation: [id number]    CT Head Without Contrast Stroke Protocol [[id number]] Collected:  01/01/20 0908     Updated:  01/01/20 0940    Narrative:         PROCEDURE: CT head without contrast    REASON FOR EXAM: Stroke. Left-sided weakness    This exam was performed according to our departmental  dose-optimization program, which includes automated exposure  control, adjustment of the mA and/or kV according to patient size  and/or use of iterative reconstruction technique.    FINDINGS: Comparison study dated October 27, 2015. Axial computer  tomography sequential imaging of the head was performed from the  vertex to the base of the skull. .Sagittal and coronal  reformation was performed .    The skull vault is intact. Paranasal sinuses and bilateral  mastoid air cells are well aerated. New small right frontal lobe  gray-white matter focus of hypodensity suspicious for acute  infarct. Bilateral frontal lobe periventricular deep white matter  small foci of hypodensity. Bilateral frontal lobe deep white  matter small circular foci of CSF attenuation with the largest  involving the left which measures 4.8 mm in greatest diameter.  Bilateral thalami small oval CSF attenuating lesions which are  sub-5 mm in size. Cerebral and cerebellar parenchymal are  otherwise unremarkable. Ventricular system and subarachnoid  spaces are normal.      Impression:       1.  New small right frontal lobe gray-white matter focus of  hypodensity suspicious for acute infarct.   2.  Bilateral frontal lobe periventricular deep white matter foci  of hypodensities most likely representing chronic ischemic  gliosis secondary to microvascular disease.  3.  Bilateral frontal lobe deep white matter old lacunar  infarcts.  4.  Bilateral thalami old lacunar infarct.  5.  ER clinician notified of findings by phone at 9:30 AM on  1/1/2020.    Electronically signed by:  Quinn Triplett  "MD  1/1/2020 9:39 AM [facility]  Workstation: [id number]            Chief Complaint on Day of Discharge: none    Hospital Course:  64-year-old  male with past medical history of hypertension, coronary artery disease, previous CVA who presented on 1/1/2020 with complaints of right-sided facial numbness and weakness down the left side of his body.  Patient was admitted related to acute frontal lobe infarct and monitored on the stepdown unit.  Tele-neurology services followed the patient during his hospital stay and recommended aspirin, Plavix, statin therapy daily as well as physical and Occupational Therapy.  Patient remains deconditioned and is opted for rehab to home program at a skilled nursing facility.  He has been accepted to [facility] today and will be discharged in stable condition.  He will need 1 week follow-up with his PCP as well as follow-up with outpatient neurology as well.    Condition on Discharge:  stable    Physical Exam on Discharge:  /59 (BP Location: Left arm, Patient Position: Sitting)   Pulse 79   Temp 97.4 °F (36.3 °C) (Oral)   Resp 18   Ht 185.4 cm (73\")   Wt 62.5 kg (137 lb 11.2 oz)   SpO2 93%   BMI 18.17 kg/m²   Physical Exam   Constitutional: He is oriented to person, place, and time. He appears well-developed and well-nourished. No distress.   HENT:   Head: Normocephalic and atraumatic.   Right Ear: External ear normal.   Left Ear: External ear normal.   Nose: Nose normal.   Eyes: Conjunctivae are normal. Right eye exhibits no discharge. Left eye exhibits no discharge. No scleral icterus.   Neck: Normal range of motion. Neck supple. No JVD present.   Cardiovascular: Normal rate, regular rhythm, normal heart sounds and intact distal pulses. Exam reveals no gallop and no friction rub.   No murmur heard.  Pulmonary/Chest: Effort normal and breath sounds normal. No stridor. No respiratory distress. He has no wheezes. He has no rales.   Abdominal: Soft. " Bowel sounds are normal. He exhibits no distension. There is no tenderness.   Musculoskeletal: Normal range of motion. He exhibits no edema.   Neurological: He is alert and oriented to person, place, and time.   Skin: Skin is warm and dry. He is not diaphoretic.   Psychiatric: He has a normal mood and affect. His behavior is normal.   Nursing note and vitals reviewed.        Discharge Disposition:  Skilled Nursing Facility (DC - External)    Discharge Medications:     Discharge Medications      New Medications      Instructions Start Date   atorvastatin 40 MG tablet  Commonly known as:  LIPITOR   40 mg, Oral, Nightly         Changes to Medications      Instructions Start Date   atenolol 25 MG tablet  Commonly known as:  TENORMIN  What changed:    · medication strength  · how much to take   25 mg, Oral, Daily   Start Date:  January 7, 2020     docusate sodium 100 MG capsule  What changed:  when to take this   100 mg, Oral, Daily   Start Date:  January 7, 2020     gabapentin 100 MG capsule  Commonly known as:  NEURONTIN  What changed:    · medication strength  · how much to take   200 mg, Oral, 3 Times Daily         Continue These Medications      Instructions Start Date   albuterol sulfate  (90 Base) MCG/ACT inhaler  Commonly known as:  PROVENTIL HFA;VENTOLIN HFA;PROAIR HFA   2 puffs, Inhalation, Every 4 Hours PRN      albuterol 1.25 MG/3ML nebulizer solution  Commonly known as:  ACCUNEB   1 ampule, Nebulization, Every 6 Hours PRN      aspirin 81 MG chewable tablet   81 mg, Oral, Daily      budesonide-formoterol 160-4.5 MCG/ACT inhaler  Commonly known as:  SYMBICORT   2 puffs, Inhalation, 2 Times Daily - RT      clopidogrel 75 MG tablet  Commonly known as:  PLAVIX   75 mg, Oral, Daily      cyclobenzaprine 10 MG tablet  Commonly known as:  FLEXERIL   10 mg, Oral, 3 Times Daily PRN      hydrOXYzine 25 MG tablet  Commonly known as:  ATARAX   25 mg, Oral, 3 Times Daily PRN      sertraline 100 MG tablet  Commonly  known as:  ZOLOFT   100 mg, Oral, Daily             Discharge Diet:   Diet Instructions     Diet: Cardiac; Thin      Discharge Diet:  Cardiac    Fluid Consistency:  Thin          Activity at Discharge:   Activity Instructions     Activity as Tolerated            Discharge Care Plan/Instructions: Follow up with PCP within one week.     Follow-up Appointments:   Additional Instructions for the Follow-ups that You Need to Schedule     Ambulatory Referral to Home Health   As directed      Face to Face Visit Date:  1/4/2020    Follow-up provider for Plan of Care?:  I treated the patient in an acute care facility and will not continue treatment after discharge.    Follow-up provider:  LORENZO ESTES [[id number]]    Reason/Clinical Findings:  deconditioning r/t cva    Describe mobility limitations that make leaving home difficult:  deconditioning r/t cva    Nursing/Therapeutic Services Requested:  Physical Therapy Occupational Therapy    PT orders:  Strengthening    Occupational orders:  Strengthening    Frequency:  1 Week 1         Discharge Follow-up with PCP   As directed       Currently Documented PCP:    Lorenzo Estes MD    PCP Phone Number:    [phone]     Follow Up Details:  one week           Follow-up Information     Lorenzo Estes MD .    Specialty:  Family Medicine  Why:  one week  Contact information:  [address] [place] [id number] [phone]                   Test Results Pending at Discharge:    Order Current Status    Blood Culture - Blood, Arm, Left Preliminary result    Blood Culture - Blood, Arm, Right Preliminary result                This document has been electronically signed by RENALDO Chase on January 6, 2020 9:09 AM

## 2020-01-06 NOTE — THERAPY DISCHARGE NOTE
Patient Name: Aubrey Montilla  : 1955    MRN: [id number]                              Today's Date: 2020     PT Treatment/Discharge note  Admit Date: 2020    Visit Dx:     ICD-10-CM ICD-9-CM   1. Cerebrovascular accident (CVA), unspecified mechanism (CMS/HCC) I63.9 434.91   2. Symbolic dysfunction R48.9 784.60   3. Impaired mobility and ADLs Z74.09 799.89   4. Impaired functional mobility, balance, gait, and endurance Z74.09 V49.89     Patient Active Problem List   Diagnosis   • Cerebrovascular accident (CVA) (CMS/HCC)   • Hypertension   • Elevated cholesterol     Past Medical History:   Diagnosis Date   • Elevated cholesterol    • Hypertension      Past Surgical History:   Procedure Laterality Date   • ENDOSCOPY N/A 2017    Procedure: ESOPHAGOGASTRODUODENOSCOPY;  Surgeon: Van Khoury DO;  Location: [facility] ENDOSCOPY;  Service:    • ENDOSCOPY N/A 2019    Procedure: ESOPHAGOGASTRODUODENOSCOPY;  Surgeon: Van Khoury DO;  Location: [facility] ENDOSCOPY;  Service: Gastroenterology     General Information     Row Name 20 1037          PT Evaluation Time/Intention    Document Type  therapy note (daily note)  (Pended)   -MM     Mode of Treatment  individual therapy;physical therapy  (Pended)   -MM     Row Name 20 1037          General Information    Patient Profile Reviewed?  yes  (Pended)   -MM     Existing Precautions/Restrictions  fall  (Pended)   -MM     Row Name 20 1037          Cognitive Assessment/Intervention- PT/OT    Orientation Status (Cognition)  oriented x 4  (Pended)   -MM     Personal Safety Interventions  fall prevention program maintained;gait belt;nonskid shoes/slippers when out of bed;supervised activity  (Pended)   -MM     Row Name 20 1037          Safety Issues, Functional Mobility    Safety Issues Affecting Function (Mobility)  ability to follow commands;insight into deficits/self awareness;positioning of assistive device  -GB      Impairments Affecting Function (Mobility)  balance;coordination;endurance/activity tolerance;motor control;postural/trunk control;strength;visual/perceptual  -GB       User Key  (r) = Recorded By, (t) = Taken By, (c) = Cosigned By    Initials Name Provider Type    Naz Mcgrath PT Physical Therapist    MM Anjali Selby PT Student        Mobility     Row Name 01/06/20 1037          Bed Mobility Assessment/Treatment    Bed Mobility Assessment/Treatment  rolling left;supine-sit;sit-supine  -GB     [place] Level (Bed Mobility)  independent  -GB     Rolling Left [place] (Bed Mobility)  independent  -GB     Supine-Sit [place] (Bed Mobility)  conditional independence  -GB     Sit-Supine [place] (Bed Mobility)  independent  -GB     Assistive Device (Bed Mobility)  head of bed elevated  (Pended)   -MM     Row Name 01/06/20 1037          Sit-Stand Transfer    Sit-Stand [place] (Transfers)  contact guard  -GB     Assistive Device (Sit-Stand Transfers)  walker, front-wheeled  -GB     Row Name 01/06/20 1037          Gait/Stairs Assessment/Training    Gait/Stairs Assessment/Training  gait/ambulation assistive device;distance ambulated;gait pattern;gait deviations  -GB     [place] Level (Gait)  contact guard;minimum assist (75% patient effort);1 person assist  -GB     Assistive Device (Gait)  walker, front-wheeled  -GB     Distance in Feet (Gait)  172 x 2  -GB     Pattern (Gait)  step-to  -GB     Deviations/Abnormal Patterns (Gait)  base of support, narrow;stephan decreased;gait speed decreased;stride length decreased;antalgic  -GB     Bilateral Gait Deviations  forward flexed posture;heel strike decreased;lateral trunk flexion;leans right;weight shift ability decreased  -GB     Right Sided Gait Deviations  leans right  -GB     Comment (Gait/Stairs)  At least 3 LOB requiering Joseph to regain balance and prevent fall. He continues to favor the right side by leaning towards the right,  walking towards the right, more WS on the right (decreased on L), and FWRW points towards the R.  -GB       User Key  (r) = Recorded By, (t) = Taken By, (c) = Cosigned By    Initials Name Provider Type    Naz Mcgrath, PT Physical Therapist    MM Anjali Selby PT Student        Obj/Interventions     Row Name 01/06/20 1037          Therapeutic Exercise    Lower Extremity (Therapeutic Exercise)  LAQ (long arc quad), bilateral;marching while seated 20x B, no UE assist for balane (arms across chest). both used for balance and strengthening.  -GB     Exercise Type (Therapeutic Exercise)  AROM (active range of motion)  -GB     Position (Therapeutic Exercise)  seated  -GB     Sets/Reps (Therapeutic Exercise)  20 reps each  (Pended)   -MM     Expected Outcome (Therapeutic Exercise)  improve functional stability;improve motor control;improve postural control;improve performance, transfer skills;improve performance, gait skills  (Pended)   -MM     Comment (Therapeutic Exercise)  Trunk rotation to the L while reaching outside CHLOE 10x reaching B UE at different hights/distances. Sit<>stand without use of UE x8. Sit to stand used to strengthen his eccentric quad control for sit to stand. He performed w/out pain but noted fatigue  -GB     Row Name 01/06/20 1037          Static Sitting Balance    Level of [place] (Unsupported Sitting, Static Balance)  supervision  -GB     Sitting Position (Unsupported Sitting, Static Balance)  sitting on edge of bed  -GB     Time Able to Maintain Position (Unsupported Sitting, Static Balance)  more than 5 minutes  -GB     Comment (Unsupported Sitting, Static Balance)  Leans towards the right which increased w/ fatigue; by end of session he was leaning R to the point of almost falling to R, protected self by using his RUE support on bed.  -GB     Row Name 01/06/20 1037          Dynamic Sitting Balance    Level of [place], Reaches Outside Midline (Sitting, Dynamic Balance)   supervision  -GB     Sitting Position, Reaches Outside Midline (Sitting, Dynamic Balance)  sitting on edge of bed  -GB     Comment, Reaches Outside Midline (Sitting, Dynamic Balance)  Leans towards the right  -GB     Row Name 01/06/20 1037          Static Standing Balance    Level of [place] (Supported Standing, Static Balance)  contact guard assist  -GB     Time Able to Maintain Position (Supported Standing, Static Balance)  4 to 5 minutes  -GB     Assistive Device Utilized (Supported Standing, Static Balance)  walker, rolling  -GB     Comment (Supported Standing, Static Balance)  reaching w/ gracia hands clasped x 2 ~8 reps each; focus was on rotation and wt L to encourage more balance for midline control. These were used as neuromuscular strengthing measures.  -GB     Row Name 01/06/20 1037          Sensory Assessment/Intervention    Sensory General Assessment  no sensation deficits identified  -GB       User Key  (r) = Recorded By, (t) = Taken By, (c) = Cosigned By    Initials Name Provider Type    Naz Mcgrath, PT Physical Therapist    Anjali Waller PT Student        Goals/Plan     Row Name 01/06/20 1037          Bed Mobility Goal 1 (PT)    Activity/Assistive Device (Bed Mobility Goal 1, PT)  sit to supine;supine to sit  -GB     [place] Level/Cues Needed (Bed Mobility Goal 1, PT)  independent  -GB     Time Frame (Bed Mobility Goal 1, PT)  10 days  -GB     Progress/Outcomes (Bed Mobility Goal 1, PT)  goal not met;good progress toward goal  -GB     Row Name 01/06/20 1037          Transfer Goal 1 (PT)    Activity/Assistive Device (Transfer Goal 1, PT)  sit-to-stand/stand-to-sit;bed-to-chair/chair-to-bed;toilet  -GB     [place] Level/Cues Needed (Transfer Goal 1, PT)  conditional independence  -GB     Time Frame (Transfer Goal 1, PT)  2 weeks  -GB     Progress/Outcome (Transfer Goal 1, PT)  goal not met;good progress toward goal  -GB     Row Name 01/06/20 1037          Gait Training  Goal 1 (PT)    Activity/Assistive Device (Gait Training Goal 1, PT)  assistive device use;gait (walking locomotion)  -GB     [place] Level (Gait Training Goal 1, PT)  independent  -GB     Distance (Gait Goal 1, PT)  200 ft if  home w 24/7 caregiver, 1000 ft if home as primary caregiver, w/ or w/out AD.   -GB     Time Frame (Gait Training Goal 1, PT)  long term goal (LTG);3 weeks  -GB     Progress/Outcome (Gait Training Goal 1, PT)  goal not met;good progress toward goal  -GB     Row Name 01/06/20 1037          Balance Goal 1 (PT)    Activity/Assistive Device (Balance Goal 1, PT)  standing, static;standing, dynamic  -GB     [place] Level/Cues Needed (Balance Goal 1, PT)  contact guard assist  -GB     Time Frame (Balance Goal 1, PT)  5 days  -GB     Progress/Outcomes (Balance Goal 1, PT)  goal not met;good progress toward goal  -GB     Row Name 01/06/20 1037          Stairs Goal 1 (PT)    Activity/Assistive Device (Stairs Goal 1, PT)  ascending stairs;descending stairs  -GB     [place] Level/Cues Needed (Stairs Goal 1, PT)  conditional independence;independent  -GB     Number of Stairs (Stairs Goal 1, PT)  4  -GB     Time Frame (Stairs Goal 1, PT)  3 weeks  -GB     Progress/Outcome (Stairs Goal 1, PT)  goal not met  -GB     Row Name 01/06/20 1037          Patient Education Goal (PT)    Activity (Patient Education Goal, PT)  pt will demonstrate safety techniques in mobility and self care before considering d/c to home unless capable caregiver available on consistent basis  -GB     [place]/Cues/Accuracy (Memory Goal 2, PT)  demonstrates adequately  -GB     Time Frame (Patient Education Goal, PT)  long term goal (LTG);by discharge  -GB     Progress/Outcome (Patient Education Goal, PT)  goal not met  -GB       User Key  (r) = Recorded By, (t) = Taken By, (c) = Cosigned By    Initials Name Provider Type    Naz Mcgrath, PT Physical Therapist        Clinical Impression     Row Name  01/06/20 1037          Pain Assessment    Additional Documentation  Pain Scale: Numbers Pre/Post-Treatment (Group)  (Pended)   -MM     Row Name 01/06/20 1037          Pain Scale: Numbers Pre/Post-Treatment    Pain Scale: Numbers, Pretreatment  0/10 - no pain  (Pended)   -MM     Pain Scale: Numbers, Post-Treatment  0/10 - no pain  -GB     Row Name 01/06/20 1037          Plan of Care Review    Plan of Care Reviewed With  patient  (Pended)   -MM     Progress  improving  -GB     Outcome Summary  PT treatment complete today. Pt ambulated 172'x2 with at least 3 LOB requiring Joseph to regain balance and prevent fall. He continues to favor the right side by leaning towards the right, walking towards the right, more WS on the right (decreased on L), and FWRW points towards the R. Pt apears to have a visual feild deficit as his peripheral vision range on the L is less when compaired to the R. Pt tolerated treatmetn well which include gait mechanics, sitting balance, LE strengthening, and transfers. Pt would benefit from continued skilled PT.  -GB     Row Name 01/06/20 1037          Physical Therapy Clinical Impression    Patient/Family Goals Statement (PT Clinical Impression)  return home with wife  (Pended)   -MM     Criteria for Skilled Interventions Met (PT Clinical Impression)  yes  (Pended)   -MM     Rehab Potential (PT Clinical Summary)  good, to achieve stated therapy goals  (Pended)   -MM     Predicted Duration of Therapy (PT)  Until all goals are met or pt is d/c.  (Pended)   -MM     Row Name 01/06/20 1037          Vital Signs    Pre Systolic BP Rehab  120  (Pended)   -MM     Pre Treatment Diastolic BP  58  (Pended)   -MM     Intra Systolic BP Rehab  111  (Pended)   -MM     Intra Treatment Diastolic BP  59  (Pended)   -MM     Post Systolic BP Rehab  117  -GB     Post Treatment Diastolic BP  53  -GB     Pretreatment Heart Rate (beats/min)  69  (Pended)   -MM     Intratreatment Heart Rate (beats/min)  73  (Pended)   -MM      Posttreatment Heart Rate (beats/min)  75  -GB     Pre SpO2 (%)  97  (Pended)   -MM     O2 Delivery Pre Treatment  room air  (Pended)   -MM     Intra SpO2 (%)  95  (Pended)   -MM     O2 Delivery Intra Treatment  room air  (Pended)   -MM     Post SpO2 (%)  98  -GB     O2 Delivery Post Treatment  room air  -GB     Pre Patient Position  Supine  -GB     Intra Patient Position  Sitting  -GB     Post Patient Position  Sitting  -GB     Row Name 01/06/20 1037          Positioning and Restraints    Pre-Treatment Position  in bed  (Pended)   -MM     Post Treatment Position  bed  -GB     In Bed  fowlers;call light within reach;encouraged to call for assist;side rails up x2  -GB       User Key  (r) = Recorded By, (t) = Taken By, (c) = Cosigned By    Initials Name Provider Type    Naz Mcgrath, PT Physical Therapist    Anjali Waller PT Student        Outcome Measures     Row Name 01/06/20 1037          How much help from another person do you currently need...    Turning from your back to your side while in flat bed without using bedrails?  4  -GB     Moving from lying on back to sitting on the side of a flat bed without bedrails?  4  -GB     Moving to and from a bed to a chair (including a wheelchair)?  3  -GB     Standing up from a chair using your arms (e.g., wheelchair, bedside chair)?  3  -GB     Climbing 3-5 steps with a railing?  2  -GB     To walk in hospital room?  3  -GB     AM-PAC 6 Clicks Score (PT)  19  -GB     Row Name 01/06/20 1037          Modified [place] Scale    Modified [place] Scale  4 - Moderately severe disability.  Unable to walk without assistance, and unable to attend to own bodily needs without assistance.  -GB     Row Name 01/06/20 1037          Functional Assessment    Outcome Measure Options  AM-PAC 6 Clicks Basic Mobility (PT)  (Pended)   -MM       User Key  (r) = Recorded By, (t) = Taken By, (c) = Cosigned By    Initials Name Provider Type    Naz Mcgrath, PT  Physical Therapist    Anjali Waller PT Student          PT Recommendation and Plan  Planned Therapy Interventions (PT Eval): balance training, bed mobility training, gait training, home exercise program, lumbar stabilization, strengthening, stair training, postural re-education, patient/family education, stretching, transfer training  Outcome Summary/Treatment Plan (PT)  Anticipated Equipment Needs at Discharge (PT): front wheeled walker  Anticipated Discharge Disposition (PT): anticipate therapy at next level of care, skilled nursing facility  Plan of Care Reviewed With: patient  Progress: improving  Outcome Summary: PT treatment complete today. Pt ambulated 172'x2 with at least 3 LOB requiring Joseph to regain balance and prevent fall. He continues to favor the right side by leaning towards the right, walking towards the right, more WS on the right (decreased on L), and FWRW points towards the R. Pt apears to have a visual feild deficit as his peripheral vision range on the L is less when compaired to the R. Pt tolerated treatmetn well which include gait mechanics, sitting balance, LE strengthening, and transfers. Pt would benefit from continued skilled PT.     Time Calculation:   PT Charges     Row Name 01/06/20 1037             Time Calculation    Start Time  1037  (Pended)   -MM      Stop Time  1130  (Pended)   -MM      Time Calculation (min)  53 min  (Pended)   -MM      PT Received On  01/06/20  (Pended)   -MM      PT Goal Re-Cert Due Date  01/15/20  (Pended)   -MM        User Key  (r) = Recorded By, (t) = Taken By, (c) = Cosigned By    Initials Name Provider Type    Anjali Waller PT Student        Therapy Charges for Today     Code Description Service Date Service Provider Modifiers Qty    22244787385 HC PT NEUROMUSC RE EDUCATION EA 15 MIN 1/6/2020 Naz Bauer, PT GP 3    [id number] HC GAIT TRAINING EA 15 MIN 1/6/2020 Naz Bauer, PT GP 1          PT G-Codes  Outcome Measure  Options: (P) AM-PAC 6 Clicks Basic Mobility (PT)  AM-PAC 6 Clicks Score (PT): 19  AM-PAC 6 Clicks Score (OT): 14  Modified [place] Scale: 4 - Moderately severe disability.  Unable to walk without assistance, and unable to attend to own bodily needs without assistance.    PT Discharge Summary  Anticipated Discharge Disposition (PT): anticipate therapy at next level of care, skilled nursing facility  Reason for Discharge: Per MD order  Outcomes Achieved: Patient able to partially acheive established goals  Discharge Destination: SNF    Naz Bauer, PT  1/6/2020

## 2020-01-06 NOTE — DISCHARGE PLACEMENT REQUEST
"Aubrey Wilcox (64 y.o. Male)     Date of Birth Social Security Number Address Home Phone MRN    1955  [address] [place], [place] [id number] [phone] [id number]    Anabaptist Marital Status          Adventism        Admission Date Admission Type Admitting Provider Attending Provider Department, Room/Bed    20 Emergency Pawel Pratt MD [place], Emmanuel [place], MD [facility], /    Discharge Date Discharge Disposition Discharge Destination         Skilled Nursing Facility (DC - External)              Attending Provider:  Pawel Pratt MD    Allergies:  Contrast Dye    Isolation:  None   Infection:  None   Code Status:  CPR    Ht:  185.4 cm (73\")   Wt:  62.5 kg (137 lb 11.2 oz)    Admission Cmt:  None   Principal Problem:  Cerebrovascular accident (CVA) (CMS/[facility]) [I63.9]                 Active Insurance as of 2020     Primary Coverage     Payor Plan Insurance Group Employer/Plan Group    [facility] DEPT [id number]     Payor Plan Address Payor Plan Phone Number Payor Plan Fax Number Effective Dates    [employer] SERVICE 04 [phone]  2018 - None Entered    [address] [place] [id number]       Subscriber Name Subscriber Birth Date Member ID       AUBREY WILCOX 1955 [id number]                 Emergency Contacts      (Rel.) Home Phone Work Phone Mobile Phone    Billie Wilcox (Spouse) [phone] -- [phone]        [facility]  [address] [place] [id number]  Dept. Phone:  [phone]  Dept. Fax:   Date Ordered: 2020         Patient:  Aubrey Wilcox MRN:  [id number]   [address] [place] [address] :  1955  SSN:    Phone: [phone] Sex:  M     Weight: 62.5 kg (137 lb 11.2 oz)         Ht Readings from Last 1 Encounters:   20 185.4 cm (73\")         Miscellaneous " DME   (Order ID: [id number])    Diagnosis:  Cerebrovascular accident (CVA), unspecified mechanism (CMS/HCC) (I63.9 [ICD-10-CM] 434.91 [ICD-9-CM])   Quantity:  1     Type of DME: rolling walker  Length of Need (99 Months = Lifetime): 99 Months = Lifetime        Authorizing Provider's Phone: [phone]   Authorizing Provider:Juliet Hazel APRN  Authorizing Provider's NPI: [id number]  Order Entered By: Juliet Hazel APRN 2020  9:12 AM     Electronically signed by: Juliet Hazel APRN 2020  9:12 AM                 History & Physical      Pawel Pratt MD at 20 [address] [place], [place] [address]IST HISTORY AND PHYSICAL    Patient Identification:  Name:  Aubrey Montilla  Age:  64 y.o.  Sex:  male  :  1955  MRN:  [id number]   Visit Number:  [id number]  Primary Care Physician:  Lorenzo Mondragon MD     Chief complaint: Right facial numbness left sided weakness.    History of presenting illness:  64 y.o. male with known history of hypertension, coronary artery disease previous CVA who presents today emergency room complaining of right-sided facial numbness and weakness of the left upper and lower extremities all of 1 day duration.  Patient said that his legs were so weak that he fell down.  On presentation to the emergency room patient had a CT of the brain which showed he has an acute infarct.  Telemetry neurology was consulted and recommended CTA which did not show any significant occlusion.  Of note patient has been on aspirin and Plavix at home.  Patient also has been coughing.  His cough is productive of greenish phlegm but he denies any chest pain or shortness of breath.  He has not had any fever and does not have any urinary symptoms.  Patient had leukocytosis on evaluation in the emergency room.  ---------------------------------------------------------------------------------------------------------------------   Review of Systems   SENG:  Negative.    Eyes: Negative.    Respiratory: Positive for cough.    Cardiovascular: Negative.    Gastrointestinal: Negative.    Endocrine: Negative.    Genitourinary: Negative.    Musculoskeletal: Negative.    Skin: Negative.    Neurological: Positive for weakness and numbness.   Psychiatric/Behavioral: Negative.       ---------------------------------------------------------------------------------------------------------------------   History reviewed. No pertinent past medical history.  Past Surgical History:   Procedure Laterality Date   • ENDOSCOPY N/A 8/29/2017    Procedure: ESOPHAGOGASTRODUODENOSCOPY;  Surgeon: Van Khoury DO;  Location: [facility] ENDOSCOPY;  Service:    • ENDOSCOPY N/A 6/11/2019    Procedure: ESOPHAGOGASTRODUODENOSCOPY;  Surgeon: Van Khoury DO;  Location: [facility] ENDOSCOPY;  Service: Gastroenterology     History reviewed. No pertinent family history.  Social History     Socioeconomic History   • Marital status:      Spouse name: Not on file   • Number of children: Not on file   • Years of education: Not on file   • Highest education level: Not on file     ---------------------------------------------------------------------------------------------------------------------   Allergies:  Patient has no known allergies.  ---------------------------------------------------------------------------------------------------------------------   Prior to Admission Medications     Prescriptions Last Dose Informant Patient Reported? Taking?    albuterol (ACCUNEB) 1.25 MG/3ML nebulizer solution   Yes No    Take 1 ampule by nebulization Every 6 (Six) Hours As Needed for Wheezing.    albuterol (PROVENTIL HFA;VENTOLIN HFA) 108 (90 Base) MCG/ACT inhaler   Yes No    Inhale 2 puffs Every 4 (Four) Hours As Needed for Wheezing.    atenolol (TENORMIN) 50 MG tablet   Yes No    Take 50 mg by mouth Daily.    atorvastatin (LIPITOR) 80 MG tablet   Yes No    Take 80 mg by mouth Daily.     budesonide-formoterol (SYMBICORT) 160-4.5 MCG/ACT inhaler   Yes No    Inhale 2 puffs 2 (Two) Times a Day.    clopidogrel (PLAVIX) 75 MG tablet   Yes No    Take 75 mg by mouth Daily.    cyclobenzaprine (FLEXERIL) 10 MG tablet   Yes No    Take 10 mg by mouth 3 (Three) Times a Day As Needed for Muscle Spasms.    docusate sodium (COLACE) 100 MG capsule   Yes No    Take 100 mg by mouth 2 (Two) Times a Day.    gabapentin (NEURONTIN) 400 MG capsule   Yes No    Take 400 mg by mouth 3 (Three) Times a Day.    hydrOXYzine (ATARAX) 25 MG tablet   Yes No    Take 25 mg by mouth 3 (Three) Times a Day As Needed for Itching.    sertraline (ZOLOFT) 100 MG tablet   Yes No    Take 100 mg by mouth Daily.        ---------------------------------------------------------------------------------------------------------------------   Vital Signs:  Temp:  [97.5 °F (36.4 °C)] 97.5 °F (36.4 °C)  Heart Rate:  [97] 97  Resp:  [20] 20  BP: (146)/(63) 146/63  There were no vitals filed for this visit.  There is no height or weight on file to calculate BMI.  ---------------------------------------------------------------------------------------------------------------------   Physical Exam:  Constitutional: Middle aged man.  No respiratory distress.      HENT:  Head: Normocephalic and atraumatic.  Mouth:  Moist mucous membranes.    Eyes:  Conjunctivae and EOM are normal.  Pupils are equal, round, and reactive to light.  No scleral icterus.  Neck:  Neck supple.  No JVD present.    Cardiovascular:  Normal rate, regular rhythm and normal heart sounds with no murmur.  Pulmonary/Chest:  No respiratory distress, no wheezes, no crackles, with normal breath sounds and good air movement.  Abdominal:  Soft.  Bowel sounds are normal.  No distension and no tenderness.   Musculoskeletal:  No edema, no tenderness, and no deformity.  No red or swollen joints anywhere.  Muscle wasting especially in the small fingers and thenar eminence  Neurological:  Alert and  oriented to person, place, and time.  No cranial nerve deficit.  No tongue deviation.  No facial droop.  No slurred speech.  Mild weakness of the left .  Skin:  Skin is warm and dry.  No rash noted.  No pallor.   Psychiatric:  Normal mood and affect.  Behavior is normal.  Judgment and thought content normal.   Peripheral vascular:  No edema and strong pulses on all 4 extremities.  Genitourinary: Deferred  ---------------------------------------------------------------------------------------------------------------------  EKG:      ---------------------------------------------------------------------------------------------------------------------   Results from last 7 days   Lab Units 01/01/20  0859   WBC 10*3/mm3 21.16*   HEMOGLOBIN g/dL 12.7*   HEMATOCRIT % 38.9   MCV fL 90.5   MCHC g/dL 32.6   PLATELETS 10*3/mm3 366   INR  1.19         Results from last 7 days   Lab Units 01/01/20  0859   SODIUM mmol/L 138   POTASSIUM mmol/L 4.0   CHLORIDE mmol/L 97*   CO2 mmol/L 26.0   BUN mg/dL 16   CREATININE mg/dL 1.33*   EGFR IF NONAFRICN AM mL/min/1.73 54*   CALCIUM mg/dL 9.4   GLUCOSE mg/dL 95   ALBUMIN g/dL 4.10   BILIRUBIN mg/dL 0.6   ALK PHOS U/L 102   AST (SGOT) U/L 13   ALT (SGPT) U/L 7   CrCl cannot be calculated (Unknown ideal weight.).  No results found for: AMMONIA  Results from last 7 days   Lab Units 01/01/20  0859   TROPONIN T ng/mL <0.010         No results found for: HGBA1C  No results found for: TSH, FREET4  No results found for: PREGTESTUR, PREGSERUM, HCG, HCGQUANT  Pain Management Panel     There is no flowsheet data to display.        No results found for: BLOODCX  No results found for: URINECX  No results found for: WOUNDCX  No results found for: STOOLCX        I have personally looked at the labs and they are stated above.  ---------------------------------------------------------------------------------------------------------------------  Imaging Results (Last 7 Days)     Procedure Component  Value Units Date/Time    CT Angiogram Neck [[id number]] Resulted:  01/01/20 1037     Updated:  01/01/20 1037    CT Angiogram Head [[id number]] Resulted:  01/01/20 1037     Updated:  01/01/20 1037    XR Chest 1 View [[id number]] Collected:  01/01/20 0928     Updated:  01/01/20 0945    Narrative:         PROCEDURE: Single chest view AP    REASON FOR EXAM:Stroke Protocol (Onset > 12 hrs)    FINDINGS: Comparison exam dated October 27, 2015. Cardiac and  pulmonary vasculature are normal. Lungs are clear. Pleural spaces  are normal. No acute osseous abnormality.      Impression:       No acute cardiopulmonary abnormality.    Electronically signed by:  Quinn Triplett MD  1/1/2020 9:44 AM CST  Workstation: [id number]    CT Head Without Contrast Stroke Protocol [[id number]] Collected:  01/01/20 0908     Updated:  01/01/20 0940    Narrative:         PROCEDURE: CT head without contrast    REASON FOR EXAM: Stroke. Left-sided weakness    This exam was performed according to our departmental  dose-optimization program, which includes automated exposure  control, adjustment of the mA and/or kV according to patient size  and/or use of iterative reconstruction technique.    FINDINGS: Comparison study dated October 27, 2015. Axial computer  tomography sequential imaging of the head was performed from the  vertex to the base of the skull. .Sagittal and coronal  reformation was performed .    The skull vault is intact. Paranasal sinuses and bilateral  mastoid air cells are well aerated. New small right frontal lobe  gray-white matter focus of hypodensity suspicious for acute  infarct. Bilateral frontal lobe periventricular deep white matter  small foci of hypodensity. Bilateral frontal lobe deep white  matter small circular foci of CSF attenuation with the largest  involving the left which measures 4.8 mm in greatest diameter.  Bilateral thalami small oval CSF attenuating lesions which are  sub-5 mm in size. Cerebral and cerebellar parenchymal  are  otherwise unremarkable. Ventricular system and subarachnoid  spaces are normal.      Impression:       1.  New small right frontal lobe gray-white matter focus of  hypodensity suspicious for acute infarct.   2.  Bilateral frontal lobe periventricular deep white matter foci  of hypodensities most likely representing chronic ischemic  gliosis secondary to microvascular disease.  3.  Bilateral frontal lobe deep white matter old lacunar  infarcts.  4.  Bilateral thalami old lacunar infarct.  5.  ER clinician notified of findings by phone at 9:30 AM on  1/1/2020.    Electronically signed by:  Quinn Triplett MD  1/1/2020 9:39 AM [facility]  Workstation: [id number]          I have personally reviewed the radiology images and read the final radiology report.  ---------------------------------------------------------------------------------------------------------------------  Assessment:  Acute frontal lobe infarct  Leukocytosis probably due to bronchitis  Coronary artery disease  Peripheral vascular disease  Hypertension    Plan:   Place patient in observation status.  Diet: Heart healthy diet.  Patient passed swallow evaluation.  Gentle rehydration with IV normal saline 75 cc per h  Check procalcitonin level.  Start patient on IV doxycycline.  Obtain blood cultures and urinalysis.  At home patient is already on aspirin and Plavix.  Will continue same  We will continue patient on Lipitor.  Follow-up with MRI result  Follow-up with 2D echo results with bubble studies.  PT/OT evaluation and management.  Patient blood pressure is adequately controlled we will continue with current home medications.  Appreciate telemetry neurology recommendation.      Prophylaxis  DVT.  -SCDs    Position:  Observation status.  Patient is full code  Patient spouse at the medical power of .  Anticipate discharge home probably with home health in the next 24 to 48 hours.  Plan of care was discussed with the patient.    Pawel Wolfe  MD [place]  20  10:48 AM     Dragon disclaimer:  Much of this encounter note is an electronic transcription/translation of spoken language to printed text. The electronic translation of spoken language may permit erroneous, or at times, nonsensical words or phrases to be inadvertently transcribed; Although I have reviewed the note for such errors, some may still exist.                      Electronically signed by Pawel Pratt MD at 20 1517       Physician Progress Notes (last 24 hours) (Notes from 20 1127 through 20 1127)    No notes of this type exist for this encounter.            Physical Therapy Notes (last 24 hours) (Notes from 20 1127 through 20 1127)      Maximilian Noriega PTA at 20 1619  Version 1 of 1         Problem: Patient Care Overview  Goal: Plan of Care Review  Outcome: Ongoing (interventions implemented as appropriate)  Flowsheets  Taken 2020 1435 by Carmella Saucedo, АНДРЕЙ/L  Plan of Care Reviewed With: patient  Taken 2020 1618 by Maximilian Noriega PTA  Outcome Summary: Pt lee tx well with good participation. Pt t/f sit-stand-sit with SBAx1. Sit-sup with SBAx1. Pt amb 210' with FWRW with CGAx1 with slight unsteadiness noted.       Electronically signed by Maximilian Noriega PTA at 20 1619     Maximilian Noriega PTA at 20 1620  Version 2 of 2         Acute Care - Physical Therapy Treatment Note  [facility]     Patient Name: Aubrey Montilla  : 1955  MRN: [id number]  Today's Date: 2020  Onset of Illness/Injury or Date of Surgery: 20  Date of Referral to PT: 20  Referring Physician: Dr. Pratt    Admit Date: 2020    Visit Dx:    ICD-10-CM ICD-9-CM   1. Cerebrovascular accident (CVA), unspecified mechanism (CMS/HCC) I63.9 434.91   2. Symbolic dysfunction R48.9 784.60   3. Impaired mobility and ADLs Z74.09 799.89   4. Impaired functional mobility, balance, gait, and endurance Z74.09 V49.89      Patient Active Problem List   Diagnosis   • Cerebrovascular accident (CVA) (CMS/HCC)   • Hypertension   • Elevated cholesterol       Therapy Treatment    Rehabilitation Treatment Summary     Row Name 01/05/20 1425 01/05/20 0825          Treatment Time/Intention    Discipline  physical therapy assistant  -EM  occupational therapy assistant  -LW     Document Type  therapy note (daily note)  -EM  therapy note (daily note)  -LW     Subjective Information  no complaints  -EM  no complaints  -LW     Mode of Treatment  physical therapy;individual therapy  -EM  occupational therapy  -LW     Patient/Family Observations  Spouse present in room  -EM  Pt supine in bed. No family present.   -LW     Care Plan Review  --  care plan/treatment goals reviewed  -LW     Total Minutes, Occupational Therapy Treatment  --  85  -LW     Therapy Frequency (OT Eval)  --  daily  -LW     Patient Effort  good  -EM  good  -LW     Existing Precautions/Restrictions  fall  -EM  fall  -LW     Equipment Issued to Patient  --  gait belt  -LW     Recorded by [EM] Maximilian Noriega, NATALIE 01/05/20 1617 [LW] Carmella Saucedo, CHIANG/L 01/05/20 1432     Row Name 01/05/20 1425 01/05/20 0825          Vital Signs    Pre Systolic BP Rehab  109  -EM  117  -LW     Pre Treatment Diastolic BP  55  -EM  55  -LW     Post Systolic BP Rehab  133  -EM  --     Post Treatment Diastolic BP  87  -EM  --     Intratreatment Heart Rate (beats/min)  75  -EM  88  -LW     Posttreatment Heart Rate (beats/min)  --  83  -LW     Pretreatment Resp Rate (breaths/min)  76  -EM  79  -LW     Pre SpO2 (%)  94  -EM  100  -LW     O2 Delivery Pre Treatment  room air  -EM  room air  -LW     Intra SpO2 (%)  --  99  -LW     O2 Delivery Intra Treatment  --  room air  -LW     Post SpO2 (%)  95  -EM  99  -LW     O2 Delivery Post Treatment  room air  -EM  room air  -LW     Pre Patient Position  Sitting  -EM  Supine  -LW     Intra Patient Position  Standing  -EM  Sitting  -LW     Post Patient Position   Supine  -EM  Sitting  -LW     Recorded by [EM] Maximilian Noriega, PTA 01/05/20 1617 [LW] Carmella Saucedo COTA/L 01/05/20 1432     Row Name 01/05/20 1425 01/05/20 0825          Cognitive Assessment/Intervention- PT/OT    Affect/Mental Status (Cognitive)  WFL  -EM  WFL  -LW     Orientation Status (Cognition)  oriented x 4  -EM  oriented x 4  -LW     Follows Commands (Cognition)  follows two step commands;75-90% accuracy  -EM  follows two step commands;75-90% accuracy  -LW     Safety Deficit (Cognitive)  mild deficit  -EM  mild deficit  -LW     Personal Safety Interventions  fall prevention program maintained;gait belt;nonskid shoes/slippers when out of bed;supervised activity  -EM  fall prevention program maintained;gait belt;nonskid shoes/slippers when out of bed;supervised activity  -LW     Recorded by [EM] Maximilian Noriega PTA 01/05/20 1617 [LW] Carmella Saucedo COTA/L 01/05/20 1432     Row Name 01/05/20 0825             Safety Issues, Functional Mobility    Safety Issues Affecting Function (Mobility)  ability to follow commands  -LW      Impairments Affecting Function (Mobility)  balance;endurance/activity tolerance;cognition;motor control;postural/trunk control;range of motion (ROM)  -LW      Recorded by [LW] Carmella Saucedo COTA/L 01/05/20 1432      Row Name 01/05/20 1425 01/05/20 0825          Bed Mobility Assessment/Treatment    Bed Mobility Assessment/Treatment  --  bed mobility (all) activities;supine-sit  -LW     Supine-Sit [place] (Bed Mobility)  --  conditional independence  -LW     Sit-Supine [place] (Bed Mobility)  supervision  -EM  --     Bed Mobility, Safety Issues  --  decreased use of arms for pushing/pulling;decreased use of legs for bridging/pushing  -LW     Assistive Device (Bed Mobility)  head of bed elevated;bed rails  -EM  bed rails;head of bed elevated  -LW     Recorded by [EM] Maximilian Noriega, PTA 01/05/20 1617 [LW] Carmella Saucedo COTA/L 01/05/20 1432     Row Name 01/05/20 0825              Transfer Assessment/Treatment    Transfer Assessment/Treatment  bed-chair transfer;sit-stand transfer;stand-sit transfer  -LW      Recorded by [LW] Carmella Saucedo COTA/L 01/05/20 1432      Row Name 01/05/20 0825             Bed-Chair Transfer    Bed-Chair [place] (Transfers)  minimum assist (75% patient effort)  -LW      Assistive Device (Bed-Chair Transfers)  walker, front-wheeled  -LW      Recorded by [LW] Carmella Saucedo COTA/L 01/05/20 1432      Row Name 01/05/20 1425 01/05/20 0825          Sit-Stand Transfer    Sit-Stand [place] (Transfers)  supervision  -EM  minimum assist (75% patient effort)  -LW     Assistive Device (Sit-Stand Transfers)  walker, front-wheeled  -EM  walker, front-wheeled  -LW     Recorded by [EM] Maximilian Noriega, PTA 01/05/20 1617 [LW] Carmella Saucedo COTA/L 01/05/20 1432     Row Name 01/05/20 1425 01/05/20 0825          Stand-Sit Transfer    Stand-Sit [place] (Transfers)  supervision  -EM  minimum assist (75% patient effort)  -LW     Assistive Device (Stand-Sit Transfers)  walker, front-wheeled  -EM  walker, front-wheeled  -LW     Recorded by [EM] Maximilian Noriega, PTA 01/05/20 1617 [LW] Carmella Saucedo COTA/L 01/05/20 1432     Row Name 01/05/20 1425             Gait/Stairs Assessment/Training    [place] Level (Gait)  supervision  -EM      Assistive Device (Gait)  walker, front-wheeled  -EM      Distance in Feet (Gait)  210'  -EM      Deviations/Abnormal Patterns (Gait)  base of support, narrow;stephan decreased;gait speed decreased;stride length decreased  -EM      Right Sided Gait Deviations  leans right  -EM      Comment (Gait/Stairs)  Pt slightly unsteady, but no LOB noted  -EM      Recorded by [EM] Maximilian Noriega, PTA 01/05/20 1617      Row Name 01/05/20 0825             ADL Assessment/Intervention    BADL Assessment/Intervention  bathing;upper body dressing;lower body dressing;grooming;feeding  -LW      Recorded by [LW] Carmella Saucedo CHIANG/L  01/05/20 [facility]      Row Name 01/05/20 0825             Bathing Assessment/Intervention    Bathing [place] Level  bathing skills;lower body;upper body;contact guard assist;set up Pt needing many vc's to keep on task.  -LW      Assistive Devices (Bathing)  other (see comments) Pan bath  -LW      Bathing Position  edge of bed sitting  -LW      Recorded by [LW] Carmella Saucedo COTA/L 01/05/20 [facility]      Row Name 01/05/20 08             Upper Body Dressing Assessment/Training    Upper Body Dressing [place] Level  upper body dressing skills;doff;don;contact guard assist;set up;other (see comments) Hospital gon  -LW      Upper Body Dressing Position  edge of bed sitting  -LW      Recorded by [LW] Carmella Saucedo COTA/L 01/05/20 [facility]      Row Name 01/05/20 0825             Lower Body Dressing Assessment/Training    Lower Body Dressing [place] Level  lower body dressing skills;doff;don;pants/bottoms;socks;contact guard assist;set up  -LW      Lower Body Dressing Position  edge of bed sitting;supported standing  -LW      Recorded by [LW] Carmella Saucedo COTA/L 01/05/20 [facility]      Row Name 01/05/20 0825             Grooming Assessment/Training    [place] Level (Grooming)  grooming skills;hair care, combing/brushing;oral care regimen;shave face;wash face, hands;set up;verbal cues;minimum assist (75% patient effort)  -LW      Grooming Position  edge of bed sitting  -LW      Recorded by [LW] Carmella Saucedo COTA/L 01/05/20 [facility]      Row Name 01/05/20 0825             Self-Feeding Assessment/Training    [place] Level (Feeding)  feeding skills;liquids to mouth;prepare tray/open items;scoop food and bring to mouth;minimum assist (75% patient effort);set up  -LW      Assistive Devices (Feeding)  built-up handle utensils  -LW      Self-Feeding Assess/Train, Spillage Amount  moderate  -LW      Position (Self-Feeding)  sitting up in bed  -LW      Recorded by [LW] Carmella Saucedo COTA/NOLA 01/05/20 143       Row Name 01/05/20 0825             BADL Safety/Performance    Impairments, BADL Safety/Performance  balance;cognition;endurance/activity tolerance;grasp/prehension;motor control;range of motion;strength  -LW      Recorded by [LW] Carmella Saucedo COTA/L 01/05/20 1432      Row Name 01/05/20 1425 01/05/20 0825          Positioning and Restraints    Pre-Treatment Position  sitting in chair/recliner  -EM  in bed  -LW     Post Treatment Position  bed  -EM  chair  -LW     In Chair  reclined;call light within reach;encouraged to call for assist;exit alarm on  -EM  notified nsg;sitting;call light within reach;encouraged to call for assist;exit alarm on  -LW     Recorded by [EM] Maximilian Noriega, PTA 01/05/20 1617 [LW] Carmella Saucedo COTA/L 01/05/20 1432     Row Name 01/05/20 1425 01/05/20 0825          Pain Scale: Numbers Pre/Post-Treatment    Pain Scale: Numbers, Pretreatment  0/10 - no pain  -EM  0/10 - no pain  -LW     Pain Scale: Numbers, Post-Treatment  0/10 - no pain  -EM  0/10 - no pain  -LW     Recorded by [EM] Maximilian Noriega, PTA 01/05/20 1617 [LW] Carmella Saucedo COTA/L 01/05/20 1432     Row Name                Wound 01/01/20 1200 Left anterior toe Abrasion    Wound - Properties Group Date first assessed: 01/01/20 [AK] Time first assessed: 1200 [AK] Present on Hospital Admission: Y [AK] Side: Left [AK] Orientation: anterior [AK] Location: toe [AK], 2nd toe   Primary Wound Type: Abrasion [AK], toe nail removed traumatically   Recorded by:  [AK] Marisel Christensen RN 01/01/20 1356    Row Name 01/05/20 0825             Coping    Observed Emotional State  accepting;calm;cooperative  -LW      Verbalized Emotional State  acceptance  -LW      Recorded by [LW] Carmella Saucedo COTA/L 01/05/20 1432      Row Name 01/05/20 0825             Plan of Care Review    Plan of Care Reviewed With  patient  -LW      Progress  improving  -LW      Recorded by [LW] Carmella Saucedo COTA/L 01/05/20 1432      Row Name 01/05/20 0825              Outcome Summary/Treatment Plan (OT)    Daily Summary of Progress (OT)  progress toward functional goals is good  -LW      Plan for Continued Treatment (OT)  Continue POC  -LW      Anticipated Discharge Disposition (OT)  anticipate therapy at next level of care  -LW      Recorded by [LW] Carmella Saucedo COTA/L 01/05/20 1432      Row Name 01/05/20 1425             Outcome Summary/Treatment Plan (PT)    Plan for Continued Treatment (PT)  Continue gt and bed mobility.   -EM      Anticipated Discharge Disposition (PT)  anticipate therapy at next level of care  -EM      Recorded by [EM] Maximilian Noriega, PTA 01/05/20 1617        User Key  (r) = Recorded By, (t) = Taken By, (c) = Cosigned By    Initials Name Effective Dates Discipline    EM Maximilian Noriega, PTA 08/11/15 -  PT    Marisel Jimenez, RN 10/17/16 -  Nurse    LW Carmella Saucedo COTA/L 03/07/18 -  OT          Wound 01/01/20 1200 Left anterior toe Abrasion (Active)   Dressing Appearance open to air 1/5/2020  7:12 AM   Closure Open to air 1/5/2020  7:12 AM   Base maroon/purple 1/5/2020  7:12 AM   Periwound redness 1/5/2020  7:12 AM   Periwound Temperature cool 1/5/2020  7:12 AM   [place]iwound Skin Turgor soft 1/5/2020  7:12 AM   Drainage Amount none 1/5/2020  7:12 AM       Rehab Goal Summary     Row Name 01/05/20 1600 01/05/20 0825          Bed Mobility Goal 1 (PT)    Activity/Assistive Device (Bed Mobility Goal 1, PT)  sit to supine;supine to sit  -EM  --     [place] Level/Cues Needed (Bed Mobility Goal 1, PT)  independent  -EM  --     Time Frame (Bed Mobility Goal 1, PT)  10 days  -EM  --     Progress/Outcomes (Bed Mobility Goal 1, PT)  goal not met  -EM  --        Transfer Goal 1 (PT)    Activity/Assistive Device (Transfer Goal 1, PT)  sit-to-stand/stand-to-sit;bed-to-chair/chair-to-bed;toilet  -EM  --     [place] Level/Cues Needed (Transfer Goal 1, PT)  conditional independence  -EM  --     Time Frame (Transfer Goal 1, PT)  2 weeks  -EM   --     Progress/Outcome (Transfer Goal 1, PT)  goal not met  -EM  --        Gait Training Goal 1 (PT)    Activity/Assistive Device (Gait Training Goal 1, PT)  assistive device use;gait (walking locomotion)  -EM  --     [place] Level (Gait Training Goal 1, PT)  independent  -EM  --     Distance (Gait Goal 1, PT)  200 ft if  home w 24/7 caregiver, 1000 ft if home as primary caregiver, w/ or w/out AD.   -EM  --     Time Frame (Gait Training Goal 1, PT)  long term goal (LTG);3 weeks  -EM  --        Balance Goal 1 (PT)    Activity/Assistive Device (Balance Goal 1, PT)  standing, static;standing, dynamic  -EM  --     [place] Level/Cues Needed (Balance Goal 1, PT)  contact guard assist  -EM  --     Time Frame (Balance Goal 1, PT)  5 days  -EM  --     Progress/Outcomes (Balance Goal 1, PT)  goal not met;good progress toward goal  -EM  --        Stairs Goal 1 (PT)    Activity/Assistive Device (Stairs Goal 1, PT)  ascending stairs;descending stairs  -EM  --     [place] Level/Cues Needed (Stairs Goal 1, PT)  conditional independence;independent  -EM  --     Number of Stairs (Stairs Goal 1, PT)  4  -EM  --     Time Frame (Stairs Goal 1, PT)  3 weeks  -EM  --     Progress/Outcome (Stairs Goal 1, PT)  goal not met  -EM  --        Patient Education Goal (PT)    Activity (Patient Education Goal, PT)  pt will demonstrate safety techniques in mobility and self care before considering d/c to home unless capable caregiver available on consistent basis  -EM  --     [place]/Cues/Accuracy (Memory Goal 2, PT)  demonstrates adequately  -EM  --     Time Frame (Patient Education Goal, PT)  long term goal (LTG);by discharge  -EM  --     Progress/Outcome (Patient Education Goal, PT)  goal not met  -EM  --        Occupational Therapy Goals    Transfer Goal Selection (OT)  --  transfer, OT goal 1  -LW     Bathing Goal Selection (OT)  --  bathing, OT goal 1  -LW     Dressing Goal Selection (OT)  --  dressing, OT goal 1  -LW      Toileting Goal Selection (OT)  --  toileting, OT goal 1  -LW     Grooming Goal Selection (OT)  --  grooming, OT goal 1  -LW     Self-Feeding Goal Selection (OT)  --  self feeding, OT goal 1  -LW     Functional Mobility Goal Selection (OT)  --  functional mobility, OT goal 1  -LW     Coordination Goal Selection (OT)  --  coordination, OT goal 1;coordination, OT goal 2  -LW        Transfer Goal 1 (OT)    Activity/Assistive Device (Transfer Goal 1, OT)  --  toilet  -LW     [place] Level/Cues Needed (Transfer Goal 1, OT)  --  standby assist  -LW     Time Frame (Transfer Goal 1, OT)  --  long term goal (LTG);by discharge  -LW     Progress/Outcome (Transfer Goal 1, OT)  --  goal not met  -LW        Bathing Goal 1 (OT)    Activity/Assistive Device (Bathing Goal 1, OT)  --  bathing skills, all  -LW     [place] Level/Cues Needed (Bathing Goal 1, OT)  --  set-up required;verbal cues required;standby assist  -LW     Time Frame (Bathing Goal 1, OT)  --  long term goal (LTG);by discharge  -LW     Progress/Outcomes (Bathing Goal 1, OT)  --  (S) goal met  -LW        Dressing Goal 1 (OT)    Activity/Assistive Device (Dressing Goal 1, OT)  --  dressing skills, all  -LW     [place]/Cues Needed (Dressing Goal 1, OT)  --  set-up required;verbal cues required;standby assist  -LW     Time Frame (Dressing Goal 1, OT)  --  long term goal (LTG);by discharge  -LW     Progress/Outcome (Dressing Goal 1, OT)  --  (S) goal met  -LW        Toileting Goal 1 (OT)    Activity/Device (Toileting Goal 1, OT)  --  toileting skills, all  -LW     [place] Level/Cues Needed (Toileting Goal 1, OT)  --  standby assist  -LW     Time Frame (Toileting Goal 1, OT)  --  long term goal (LTG);by discharge  -LW     Progress/Outcome (Toileting Goal 1, OT)  --  goal not met  -LW        Grooming Goal 1 (OT)    Activity/Device (Grooming Goal 1, OT)  --  grooming skills, all  -LW     [place] (Grooming Goal 1, OT)  --  set-up required;verbal cues  required;standby assist  -LW     Time Frame (Grooming Goal 1, OT)  --  long term goal (LTG);by discharge  -LW     Progress/Outcome (Grooming Goal 1, OT)  --  goal not met  -LW        Self-Feeding Goal 1 (OT)    Activity/Assistive Device (Self-Feeding Goal 1, OT)  --  self-feeding skills, all  -LW     [place] Level/Cues Needed (Self-Feeding Goal 1, OT)  --  set-up required;verbal cues required;standby assist  -LW     Time Frame (Self-Feeding Goal 1, OT)  --  long term goal (LTG);by discharge  -LW     Progress/Outcomes (Self-Feeding Goal 1, OT)  --  goal not met  -LW        Functional Mobility Goal 1 (OT)    Activity/Assistive Device (Functional Mobility Goal 1, OT)  --  -- AD as appropriate   -LW     [place] Level/Cues Needed (Functional Mobility Goal 1, OT)  --  standby assist  -LW     Distance Goal 1 (Functional Mobility, OT)  --  for ADL tasks no LOB and good safety  -LW     Time Frame (Functional Mobility Goal 1, OT)  --  long term goal (LTG);by discharge  -LW     Progress/Outcome (Functional Mobility Goal 1, OT)  --  goal not met  -LW        Coordination Goal 1 (OT)    Activity/Assistive Device (Coordination Goal 1, OT)  --  FM task BUE fair coordination   -LW     [place] Level/Cues Needed (Coordination Goal 1, OT)  --  set-up required;standby assist  -LW     Time Frame (Coordination Goal 1, OT)  --  long term goal (LTG);by discharge  -LW     Progress/Outcomes (Coordination Goal 1, OT)  --  goal met continue  -LW        Coordination Goal 2 (OT)    Activity/Assistive Device (Coordination Goal 2, OT)  --  GM task BUE fair coordination   -LW     [place] Level/Cues Needed (Coordination Goal 2, OT)  --  standby assist;set-up required  -LW     Time Frame (Coordination Goal 2, OT)  --  long term goal (LTG);by discharge  -LW     Progress/Outcomes (Coordination Goal 2, OT)  --  goal not met  -LW       User Key  (r) = Recorded By, (t) = Taken By, (c) = Cosigned By    Initials Name Provider Type  Discipline    EM Maximilian Noriega, PTA Physical Therapy Assistant PT    Carmella Marrero, CHIANG/L Occupational Therapy Assistant OT              PT Recommendation and Plan  Anticipated Discharge Disposition (PT): anticipate therapy at next level of care  Outcome Summary/Treatment Plan (PT)  Plan for Continued Treatment (PT): Continue gt and bed mobility.   Anticipated Discharge Disposition (PT): anticipate therapy at next level of care  Outcome Summary: Pt lee tx well with good participation. Pt t/f sit-stand-sit with SBAx1. Sit-sup with SBAx1. Pt amb 210' with FWRW with CGAx1 with slight unsteadiness noted.  Outcome Measures     Row Name 01/05/20 1425 01/05/20 0825 01/04/20 0857       How much help from another person do you currently need...    Turning from your back to your side while in flat bed without using bedrails?  4  -EM  --  --    Moving from lying on back to sitting on the side of a flat bed without bedrails?  4  -EM  --  --    Moving to and from a bed to a chair (including a wheelchair)?  3  -EM  --  --    Standing up from a chair using your arms (e.g., wheelchair, bedside chair)?  4  -EM  --  --    Climbing 3-5 steps with a railing?  3  -EM  --  --    To walk in hospital room?  3  -EM  --  --    AM-PAC 6 Clicks Score (PT)  21  -EM  --  --       How much help from another is currently needed...    Putting on and taking off regular lower body clothing?  --  2  -LW  2  -SUNIL    Bathing (including washing, rinsing, and drying)  --  2  -LW  2  -SUNIL    Toileting (which includes using toilet bed pan or urinal)  --  2  -LW  2  -SUNIL    Putting on and taking off regular upper body clothing  --  2  -LW  2  -SUNIL    Taking care of personal grooming (such as brushing teeth)  --  2  -LW  2  -SUNIL    Eating meals  --  2  -LW  2  -SUNIL    AM-PAC 6 Clicks Score (OT)  --  12  -LW  12  -SUNIL       Functional Assessment    Outcome Measure Options  AM-PAC 6 Clicks Daily Activity (OT)  -EM  --  AM-PAC 6 Clicks Daily Activity (OT)  -SUNIL     Row Name 01/03/20 1500             How much help from another is currently needed...    Putting on and taking off regular lower body clothing?  2  -CS      Bathing (including washing, rinsing, and drying)  2  -CS      Toileting (which includes using toilet bed pan or urinal)  2  -CS      Putting on and taking off regular upper body clothing  2  -CS      Taking care of personal grooming (such as brushing teeth)  2  -CS      Eating meals  2  -CS      AM-PAC 6 Clicks Score (OT)  12  -CS        User Key  (r) = Recorded By, (t) = Taken By, (c) = Cosigned By    Initials Name Provider Type    EM Maximilian Noriega PTA Physical Therapy Assistant    Simi Aviles, АНДРЕЙ/NOLA Occupational Therapy Assistant    Carmella Marrero COTA/L Occupational Therapy Assistant    Genet Saleem OTA Occupational Therapy Assistant         Time Calculation:   PT Charges     Row Name 01/05/20 1620             Time Calculation    Start Time  1425  -EM      Stop Time  1444  -EM      Time Calculation (min)  19 min  -EM         Time Calculation- PT    Total Timed Code Minutes- PT  19 minute(s)  -EM        User Key  (r) = Recorded By, (t) = Taken By, (c) = Cosigned By    Initials Name Provider Type    EM Maximilian Noriega PTA Physical Therapy Assistant        Therapy Charges for Today     Code Description Service Date Service Provider Modifiers Qty    [id number] HC GAIT TRAINING EA 15 MIN 1/5/2020 Maximilian Noriega PTA GP 1        Addendum: No spouse was present in room  PT G-Codes  Outcome Measure Options: AM-PAC 6 Clicks Daily Activity (OT)  AM-PAC 6 Clicks Score (PT): 21  AM-PAC 6 Clicks Score (OT): 12  Modified Howell Scale: 4 - Moderately severe disability.  Unable to walk without assistance, and unable to attend to own bodily needs without assistance.    Maximilian Noriega PTA  1/5/2020         Electronically signed by Maximilian Noriega PTA at 01/05/20 1623     Maximilian Noriega PTA at 01/05/20 1620  Version 1 of 2         Acute Care -  Physical Therapy Treatment Note  [facility]     Patient Name: Aubrey Montilla  : 1955  MRN: [id number]  Today's Date: 2020  Onset of Illness/Injury or Date of Surgery: 20  Date of Referral to PT: 20  Referring Physician: Dr. Pratt    Admit Date: 2020    Visit Dx:    ICD-10-CM ICD-9-CM   1. Cerebrovascular accident (CVA), unspecified mechanism (CMS/HCC) I63.9 434.91   2. Symbolic dysfunction R48.9 784.60   3. Impaired mobility and ADLs Z74.09 799.89   4. Impaired functional mobility, balance, gait, and endurance Z74.09 V49.89     Patient Active Problem List   Diagnosis   • Cerebrovascular accident (CVA) (CMS/HCC)   • Hypertension   • Elevated cholesterol       Therapy Treatment    Rehabilitation Treatment Summary     Row Name 20 1425 20 0825          Treatment Time/Intention    Discipline  physical therapy assistant  -EM  occupational therapy assistant  -LW     Document Type  therapy note (daily note)  -EM  therapy note (daily note)  -LW     Subjective Information  no complaints  -EM  no complaints  -LW     Mode of Treatment  physical therapy;individual therapy  -EM  occupational therapy  -LW     Patient/Family Observations  Spouse present in room  -EM  Pt supine in bed. No family present.   -LW     Care Plan Review  --  care plan/treatment goals reviewed  -LW     Total Minutes, Occupational Therapy Treatment  --  85  -LW     Therapy Frequency (OT Eval)  --  daily  -LW     Patient Effort  good  -EM  good  -LW     Existing Precautions/Restrictions  fall  -EM  fall  -LW     Equipment Issued to Patient  --  gait belt  -LW     Recorded by [EM] Maximilian Noriega, NATALIE 20 1617 [LW] Carmella Saucedo COTA/L 20 1432     Row Name 20 1425 20 0825          Vital Signs    Pre Systolic BP Rehab  109  -EM  117  -LW     Pre Treatment Diastolic BP  55  -EM  55  -LW     Post Systolic BP Rehab  133  -EM  --     Post Treatment Diastolic BP  87  -EM  --      Intratreatment Heart Rate (beats/min)  75  -EM  88  -LW     Posttreatment Heart Rate (beats/min)  --  83  -LW     Pretreatment Resp Rate (breaths/min)  76  -EM  79  -LW     Pre SpO2 (%)  94  -EM  100  -LW     O2 Delivery Pre Treatment  room air  -EM  room air  -LW     Intra SpO2 (%)  --  99  -LW     O2 Delivery Intra Treatment  --  room air  -LW     Post SpO2 (%)  95  -EM  99  -LW     O2 Delivery Post Treatment  room air  -EM  room air  -LW     Pre Patient Position  Sitting  -EM  Supine  -LW     Intra Patient Position  Standing  -EM  Sitting  -LW     Post Patient Position  Supine  -EM  Sitting  -LW     Recorded by [EM] Maximilian Noriega, NATALIE 01/05/20 1617 [LW] Carmella Saucedo COTA/L 01/05/20 1432     Row Name 01/05/20 1425 01/05/20 0825          Cognitive Assessment/Intervention- PT/OT    Affect/Mental Status (Cognitive)  WFL  -EM  WFL  -LW     Orientation Status (Cognition)  oriented x 4  -EM  oriented x 4  -LW     Follows Commands (Cognition)  follows two step commands;75-90% accuracy  -EM  follows two step commands;75-90% accuracy  -LW     Safety Deficit (Cognitive)  mild deficit  -EM  mild deficit  -LW     Personal Safety Interventions  fall prevention program maintained;gait belt;nonskid shoes/slippers when out of bed;supervised activity  -EM  fall prevention program maintained;gait belt;nonskid shoes/slippers when out of bed;supervised activity  -LW     Recorded by [EM] Maximilian Noriega PTA 01/05/20 1617 [LW] Carmella Saucedo COTA/L 01/05/20 1432     Row Name 01/05/20 0825             Safety Issues, Functional Mobility    Safety Issues Affecting Function (Mobility)  ability to follow commands  -LW      Impairments Affecting Function (Mobility)  balance;endurance/activity tolerance;cognition;motor control;postural/trunk control;range of motion (ROM)  -LW      Recorded by [LW] Carmella Saucedo COTA/L 01/05/20 1432      Row Name 01/05/20 1425 01/05/20 0825          Bed Mobility Assessment/Treatment    Bed  Mobility Assessment/Treatment  --  bed mobility (all) activities;supine-sit  -LW     Supine-Sit [place] (Bed Mobility)  --  conditional independence  -LW     Sit-Supine [place] (Bed Mobility)  supervision  -EM  --     Bed Mobility, Safety Issues  --  decreased use of arms for pushing/pulling;decreased use of legs for bridging/pushing  -LW     Assistive Device (Bed Mobility)  head of bed elevated;bed rails  -EM  bed rails;head of bed elevated  -LW     Recorded by [EM] Maximilian Noriega, PTA 01/05/20 1617 [LW] Carmella Saucedo COTA/L 01/05/20 1432     Row Name 01/05/20 0825             Transfer Assessment/Treatment    Transfer Assessment/Treatment  bed-chair transfer;sit-stand transfer;stand-sit transfer  -LW      Recorded by [LW] Carmella Saucedo COTA/L 01/05/20 1432      Row Name 01/05/20 0825             Bed-Chair Transfer    Bed-Chair [place] (Transfers)  minimum assist (75% patient effort)  -LW      Assistive Device (Bed-Chair Transfers)  walker, front-wheeled  -LW      Recorded by [LW] Carmella Saucedo COTA/L 01/05/20 1432      Row Name 01/05/20 1425 01/05/20 0825          Sit-Stand Transfer    Sit-Stand [place] (Transfers)  supervision  -EM  minimum assist (75% patient effort)  -LW     Assistive Device (Sit-Stand Transfers)  walker, front-wheeled  -EM  walker, front-wheeled  -LW     Recorded by [EM] Maximilian Noriega, PTA 01/05/20 1617 [LW] Carmella Saucedo COTA/L 01/05/20 1432     Row Name 01/05/20 1425 01/05/20 0825          Stand-Sit Transfer    Stand-Sit [place] (Transfers)  supervision  -EM  minimum assist (75% patient effort)  -LW     Assistive Device (Stand-Sit Transfers)  walker, front-wheeled  -EM  walker, front-wheeled  -LW     Recorded by [EM] Maximilian Noriega, PTA 01/05/20 1617 [LW] Carmella Saucedo CHIANG/L 01/05/20 1432     Row Name 01/05/20 1425             Gait/Stairs Assessment/Training    [place] Level (Gait)  supervision  -EM      Assistive Device (Gait)  walker,  front-wheeled  -EM      Distance in Feet (Gait)  210'  -EM      Deviations/Abnormal Patterns (Gait)  base of support, narrow;stephan decreased;gait speed decreased;stride length decreased  -EM      Right Sided Gait Deviations  leans right  -EM      Comment (Gait/Stairs)  Pt slightly unsteady, but no LOB noted  -EM      Recorded by [EM] Maximilian Noriega, PTA 01/05/20 1617      Row Name 01/05/20 0825             ADL Assessment/Intervention    BADL Assessment/Intervention  bathing;upper body dressing;lower body dressing;grooming;feeding  -LW      Recorded by [LW] Carmella Saucedo COTA/L 01/05/20 1432      Row Name 01/05/20 0825             Bathing Assessment/Intervention    Bathing [place] Level  bathing skills;lower body;upper body;contact guard assist;set up Pt needing many vc's to keep on task.  -LW      Assistive Devices (Bathing)  other (see comments) Pan bath  -LW      Bathing Position  edge of bed sitting  -LW      Recorded by [LW] Carmella Saucedo COTA/L 01/05/20 1432      Row Name 01/05/20 0825             Upper Body Dressing Assessment/Training    Upper Body Dressing [place] Level  upper body dressing skills;doff;don;contact guard assist;set up;other (see comments) Hospital gown  -LW      Upper Body Dressing Position  edge of bed sitting  -LW      Recorded by [LW] Carmella Saucedo COTA/L 01/05/20 1432      Row Name 01/05/20 0825             Lower Body Dressing Assessment/Training    Lower Body Dressing [place] Level  lower body dressing skills;doff;don;pants/bottoms;socks;contact guard assist;set up  -LW      Lower Body Dressing Position  edge of bed sitting;supported standing  -LW      Recorded by [LW] Carmella Saucedo COTA/L 01/05/20 1432      Row Name 01/05/20 0825             Grooming Assessment/Training    [place] Level (Grooming)  grooming skills;hair care, combing/brushing;oral care regimen;shave face;wash face, hands;set up;verbal cues;minimum assist (75% patient effort)  -LW       Grooming Position  edge of bed sitting  -LW      Recorded by [LW] Carmella Saucedo COTA/L 01/05/20 1432      Row Name 01/05/20 0825             Self-Feeding Assessment/Training    [place] Level (Feeding)  feeding skills;liquids to mouth;prepare tray/open items;scoop food and bring to mouth;minimum assist (75% patient effort);set up  -LW      Assistive Devices (Feeding)  built-up handle utensils  -LW      Self-Feeding Assess/Train, [place] Amount  moderate  -LW      Position (Self-Feeding)  sitting up in bed  -LW      Recorded by [LW] Carmella Saucedo COTA/L 01/05/20 1432      Row Name 01/05/20 0825             BADL Safety/Performance    Impairments, BADL Safety/Performance  balance;cognition;endurance/activity tolerance;grasp/prehension;motor control;range of motion;strength  -LW      Recorded by [LW] Carmella Saucedo COTA/L 01/05/20 1432      Row Name 01/05/20 1425 01/05/20 0825          Positioning and Restraints    Pre-Treatment Position  sitting in chair/recliner  -EM  in bed  -LW     Post Treatment Position  bed  -EM  chair  -LW     In Chair  reclined;call light within reach;encouraged to call for assist;exit alarm on  -EM  notified nsg;sitting;call light within reach;encouraged to call for assist;exit alarm on  -LW     Recorded by [EM] Maximilian Noriega, PTA 01/05/20 1617 [LW] Carmella Saucedo COTA/L 01/05/20 1432     Row Name 01/05/20 1425 01/05/20 0825          Pain Scale: Numbers Pre/Post-Treatment    Pain Scale: Numbers, Pretreatment  0/10 - no pain  -EM  0/10 - no pain  -LW     Pain Scale: Numbers, Post-Treatment  0/10 - no pain  -EM  0/10 - no pain  -LW     Recorded by [EM] Maximilian Noriega, PTA 01/05/20 1617 [LW] Carmella Saucedo CHIANG/L 01/05/20 1432     Row Name                Wound 01/01/20 1200 Left anterior toe Abrasion    Wound - Properties Group Date first assessed: 01/01/20 [AK] Time first assessed: 1200 [AK] Present on Hospital Admission: Y [AK] Side: Left [AK] Orientation: anterior  [AK] Location: toe [AK], 2nd toe   Primary Wound Type: Abrasion [AK], toe nail removed traumatically   Recorded by:  [AK] Marisel Christensen RN 01/01/20 1356    Row Name 01/05/20 0825             Coping    Observed Emotional State  accepting;calm;cooperative  -LW      Verbalized Emotional State  acceptance  -LW      Recorded by [LW] Carmella Saucedo COTA/L 01/05/20 1432      Row Name 01/05/20 0825             Plan of Care Review    Plan of Care Reviewed With  patient  -LW      Progress  improving  -LW      Recorded by [LW] Carmella Saucedo COTA/L 01/05/20 1432      Row Name 01/05/20 0825             Outcome Summary/Treatment Plan (OT)    Daily Summary of Progress (OT)  progress toward functional goals is good  -LW      Plan for Continued Treatment (OT)  Continue POC  -LW      Anticipated Discharge Disposition (OT)  anticipate therapy at next level of care  -LW      Recorded by [LW] Carmella Saucedo COTA/L 01/05/20 1432      Row Name 01/05/20 1425             Outcome Summary/Treatment Plan (PT)    Plan for Continued Treatment (PT)  Continue gt and bed mobility.   -EM      Anticipated Discharge Disposition (PT)  anticipate therapy at next level of care  -EM      Recorded by [EM] Maximilian Noriega, PTA 01/05/20 1617        User Key  (r) = Recorded By, (t) = Taken By, (c) = Cosigned By    Initials Name Effective Dates Discipline    EM Maximilian Noriega, PTA 08/11/15 -  PT    AK Marisel Christensen RN 10/17/16 -  Nurse    LW Carmella Saucedo COTA/L 03/07/18 -  OT          Wound 01/01/20 1200 Left anterior toe Abrasion (Active)   Dressing Appearance open to air 1/5/2020  7:12 AM   Closure Open to air 1/5/2020  7:12 AM   Base maroon/purple 1/5/2020  7:12 AM   Periwound redness 1/5/2020  7:12 AM   Periwound Temperature cool 1/5/2020  7:12 AM   Periwound Skin Turgor soft 1/5/2020  7:12 AM   Drainage Amount none 1/5/2020  7:12 AM       Rehab Goal Summary     Row Name 01/05/20 1600 01/05/20 0825          Bed Mobility Goal 1 (PT)     Activity/Assistive Device (Bed Mobility Goal 1, PT)  sit to supine;supine to sit  -EM  --     [place] Level/Cues Needed (Bed Mobility Goal 1, PT)  independent  -EM  --     Time Frame (Bed Mobility Goal 1, PT)  10 days  -EM  --     Progress/Outcomes (Bed Mobility Goal 1, PT)  goal not met  -EM  --        Transfer Goal 1 (PT)    Activity/Assistive Device (Transfer Goal 1, PT)  sit-to-stand/stand-to-sit;bed-to-chair/chair-to-bed;toilet  -EM  --     [place] Level/Cues Needed (Transfer Goal 1, PT)  conditional independence  -EM  --     Time Frame (Transfer Goal 1, PT)  2 weeks  -EM  --     Progress/Outcome (Transfer Goal 1, PT)  goal not met  -EM  --        Gait Training Goal 1 (PT)    Activity/Assistive Device (Gait Training Goal 1, PT)  assistive device use;gait (walking locomotion)  -EM  --     [place] Level (Gait Training Goal 1, PT)  independent  -EM  --     Distance (Gait Goal 1, PT)  200 ft if  home w 24/7 caregiver, 1000 ft if home as primary caregiver, w/ or w/out AD.   -EM  --     Time Frame (Gait Training Goal 1, PT)  long term goal (LTG);3 weeks  -EM  --        Balance Goal 1 (PT)    Activity/Assistive Device (Balance Goal 1, PT)  standing, static;standing, dynamic  -EM  --     [place] Level/Cues Needed (Balance Goal 1, PT)  contact guard assist  -EM  --     Time Frame (Balance Goal 1, PT)  5 days  -EM  --     Progress/Outcomes (Balance Goal 1, PT)  goal not met;good progress toward goal  -EM  --        Stairs Goal 1 (PT)    Activity/Assistive Device (Stairs Goal 1, PT)  ascending stairs;descending stairs  -EM  --     [place] Level/Cues Needed (Stairs Goal 1, PT)  conditional independence;independent  -EM  --     Number of Stairs (Stairs Goal 1, PT)  4  -EM  --     Time Frame (Stairs Goal 1, PT)  3 weeks  -EM  --     Progress/Outcome (Stairs Goal 1, PT)  goal not met  -EM  --        Patient Education Goal (PT)    Activity (Patient Education Goal, PT)  pt will demonstrate safety  techniques in mobility and self care before considering d/c to home unless capable caregiver available on consistent basis  -EM  --     [place]/Cues/Accuracy (Memory Goal 2, PT)  demonstrates adequately  -EM  --     Time Frame (Patient Education Goal, PT)  long term goal (LTG);by discharge  -EM  --     Progress/Outcome (Patient Education Goal, PT)  goal not met  -EM  --        Occupational Therapy Goals    Transfer Goal Selection (OT)  --  transfer, OT goal 1  -LW     Bathing Goal Selection (OT)  --  bathing, OT goal 1  -LW     Dressing Goal Selection (OT)  --  dressing, OT goal 1  -LW     Toileting Goal Selection (OT)  --  toileting, OT goal 1  -LW     Grooming Goal Selection (OT)  --  grooming, OT goal 1  -LW     Self-Feeding Goal Selection (OT)  --  self feeding, OT goal 1  -LW     Functional Mobility Goal Selection (OT)  --  functional mobility, OT goal 1  -LW     Coordination Goal Selection (OT)  --  coordination, OT goal 1;coordination, OT goal 2  -LW        Transfer Goal 1 (OT)    Activity/Assistive Device (Transfer Goal 1, OT)  --  toilet  -LW     [place] Level/Cues Needed (Transfer Goal 1, OT)  --  standby assist  -LW     Time Frame (Transfer Goal 1, OT)  --  long term goal (LTG);by discharge  -LW     Progress/Outcome (Transfer Goal 1, OT)  --  goal not met  -LW        Bathing Goal 1 (OT)    Activity/Assistive Device (Bathing Goal 1, OT)  --  bathing skills, all  -LW     [place] Level/Cues Needed (Bathing Goal 1, OT)  --  set-up required;verbal cues required;standby assist  -LW     Time Frame (Bathing Goal 1, OT)  --  long term goal (LTG);by discharge  -LW     Progress/Outcomes (Bathing Goal 1, OT)  --  (S) goal met  -LW        Dressing Goal 1 (OT)    Activity/Assistive Device (Dressing Goal 1, OT)  --  dressing skills, all  -LW     [place]/Cues Needed (Dressing Goal 1, OT)  --  set-up required;verbal cues required;standby assist  -LW     Time Frame (Dressing Goal 1, OT)  --  long term  goal (LTG);by discharge  -LW     Progress/Outcome (Dressing Goal 1, OT)  --  (S) goal met  -LW        Toileting Goal 1 (OT)    Activity/Device (Toileting Goal 1, OT)  --  toileting skills, all  -LW     [place] Level/Cues Needed (Toileting Goal 1, OT)  --  standby assist  -LW     Time Frame (Toileting Goal 1, OT)  --  long term goal (LTG);by discharge  -LW     Progress/Outcome (Toileting Goal 1, OT)  --  goal not met  -LW        Grooming Goal 1 (OT)    Activity/Device (Grooming Goal 1, OT)  --  grooming skills, all  -LW     [place] (Grooming Goal 1, OT)  --  set-up required;verbal cues required;standby assist  -LW     Time Frame (Grooming Goal 1, OT)  --  long term goal (LTG);by discharge  -LW     Progress/Outcome (Grooming Goal 1, OT)  --  goal not met  -LW        Self-Feeding Goal 1 (OT)    Activity/Assistive Device (Self-Feeding Goal 1, OT)  --  self-feeding skills, all  -LW     [place] Level/Cues Needed (Self-Feeding Goal 1, OT)  --  set-up required;verbal cues required;standby assist  -LW     Time Frame (Self-Feeding Goal 1, OT)  --  long term goal (LTG);by discharge  -LW     Progress/Outcomes (Self-Feeding Goal 1, OT)  --  goal not met  -LW        Functional Mobility Goal 1 (OT)    Activity/Assistive Device (Functional Mobility Goal 1, OT)  --  -- AD as appropriate   -LW     [place] Level/Cues Needed (Functional Mobility Goal 1, OT)  --  standby assist  -LW     Distance Goal 1 (Functional Mobility, OT)  --  for ADL tasks no LOB and good safety  -LW     Time Frame (Functional Mobility Goal 1, OT)  --  long term goal (LTG);by discharge  -LW     Progress/Outcome (Functional Mobility Goal 1, OT)  --  goal not met  -LW        Coordination Goal 1 (OT)    Activity/Assistive Device (Coordination Goal 1, OT)  --  FM task BUE fair coordination   -LW     [place] Level/Cues Needed (Coordination Goal 1, OT)  --  set-up required;standby assist  -LW     Time Frame (Coordination Goal 1, OT)  --  long  term goal (LTG);by discharge  -LW     Progress/Outcomes (Coordination Goal 1, OT)  --  goal met continue  -LW        Coordination Goal 2 (OT)    Activity/Assistive Device (Coordination Goal 2, OT)  --  GM task BUE fair coordination   -LW     [place] Level/Cues Needed (Coordination Goal 2, OT)  --  standby assist;set-up required  -LW     Time Frame (Coordination Goal 2, OT)  --  long term goal (LTG);by discharge  -LW     Progress/Outcomes (Coordination Goal 2, OT)  --  goal not met  -LW       User Key  (r) = Recorded By, (t) = Taken By, (c) = Cosigned By    Initials Name Provider Type Discipline    EM Maximilian Noriega, PTA Physical Therapy Assistant PT    LW Carmella Saucedo, АНДРЕЙ/L Occupational Therapy Assistant OT              PT Recommendation and Plan  Anticipated Discharge Disposition (PT): anticipate therapy at next level of care  Outcome Summary/Treatment Plan (PT)  Plan for Continued Treatment (PT): Continue gt and bed mobility.   Anticipated Discharge Disposition (PT): anticipate therapy at next level of care  Outcome Summary: Pt lee tx well with good participation. Pt t/f sit-stand-sit with SBAx1. Sit-sup with SBAx1. Pt amb 210' with FWRW with CGAx1 with slight unsteadiness noted.  Outcome Measures     Row Name 01/05/20 1425 01/05/20 0825 01/04/20 0857       How much help from another person do you currently need...    Turning from your back to your side while in flat bed without using bedrails?  4  -EM  --  --    Moving from lying on back to sitting on the side of a flat bed without bedrails?  4  -EM  --  --    Moving to and from a bed to a chair (including a wheelchair)?  3  -EM  --  --    Standing up from a chair using your arms (e.g., wheelchair, bedside chair)?  4  -EM  --  --    Climbing 3-5 steps with a railing?  3  -EM  --  --    To walk in hospital room?  3  -EM  --  --    AM-PAC 6 Clicks Score (PT)  21  -EM  --  --       How much help from another is currently needed...    Putting on and taking  off regular lower body clothing?  --  2  -LW  2  -SUNIL    Bathing (including washing, rinsing, and drying)  --  2  -LW  2  -SUNIL    Toileting (which includes using toilet bed pan or urinal)  --  2  -LW  2  -SUNIL    Putting on and taking off regular upper body clothing  --  2  -LW  2  -SUNIL    Taking care of personal grooming (such as brushing teeth)  --  2  -LW  2  -SUNIL    Eating meals  --  2  -LW  2  -SUNIL    AM-PAC 6 Clicks Score (OT)  --  12  -LW  12  -SUNIL       Functional Assessment    Outcome Measure Options  AM-PAC 6 Clicks Daily Activity (OT)  -EM  --  AM-PAC 6 Clicks Daily Activity (OT)  -SUNIL    Row Name 01/03/20 1500             How much help from another is currently needed...    Putting on and taking off regular lower body clothing?  2  -CS      Bathing (including washing, rinsing, and drying)  2  -CS      Toileting (which includes using toilet bed pan or urinal)  2  -CS      Putting on and taking off regular upper body clothing  2  -CS      Taking care of personal grooming (such as brushing teeth)  2  -CS      Eating meals  2  -CS      AM-PAC 6 Clicks Score (OT)  12  -CS        User Key  (r) = Recorded By, (t) = Taken By, (c) = Cosigned By    Initials Name Provider Type    EM Maximilian Noriega PTA Physical Therapy Assistant    Simi Aviles, CHIANG/L Occupational Therapy Assistant    Carmella Marrero, CHIANG/L Occupational Therapy Assistant    Genet Saleem OTA Occupational Therapy Assistant         Time Calculation:   PT Charges     Row Name 01/05/20 1620             Time Calculation    Start Time  1425  -EM      Stop Time  1444  -EM      Time Calculation (min)  19 min  -EM         Time Calculation- PT    Total Timed Code Minutes- PT  19 minute(s)  -EM        User Key  (r) = Recorded By, (t) = Taken By, (c) = Cosigned By    Initials Name Provider Type    EM Maximilian Noriega PTA Physical Therapy Assistant        Therapy Charges for Today     Code Description Service Date Service Provider Modifiers Qty     [id number]  GAIT TRAINING EA 15 MIN 1/5/2020 Maximilian Noriega, PTA GP 1          PT G-Codes  Outcome Measure Options: AM-PAC 6 Clicks Daily Activity (OT)  AM-PAC 6 Clicks Score (PT): 21  AM-PAC 6 Clicks Score (OT): 12  Modified [place] Scale: 4 - Moderately severe disability.  Unable to walk without assistance, and unable to attend to own bodily needs without assistance.    Maximilian Noriega PTA  1/5/2020         Electronically signed by Maximilian Noriega PTA at 01/05/20 1620       Nuzhat Valera RN  [facility]  [phone]  Fax [phone]

## 2020-09-02 ENCOUNTER — TRANSCRIBE ORDERS (OUTPATIENT)
Dept: LAB | Facility: HOSPITAL | Age: 65
End: 2020-09-02

## 2020-09-02 DIAGNOSIS — E78.5 HYPERLIPIDEMIA, UNSPECIFIED HYPERLIPIDEMIA TYPE: ICD-10-CM

## 2020-09-02 DIAGNOSIS — K21.9 GASTROESOPHAGEAL REFLUX DISEASE, ESOPHAGITIS PRESENCE NOT SPECIFIED: ICD-10-CM

## 2020-09-02 DIAGNOSIS — I10 ESSENTIAL (PRIMARY) HYPERTENSION: ICD-10-CM

## 2020-09-02 DIAGNOSIS — E11.8 TYPE II DIABETES MELLITUS WITH COMPLICATION (HCC): Primary | ICD-10-CM

## 2020-09-11 ENCOUNTER — LAB (OUTPATIENT)
Dept: LAB | Facility: HOSPITAL | Age: 65
End: 2020-09-11

## 2020-09-11 DIAGNOSIS — E78.5 HYPERLIPIDEMIA, UNSPECIFIED HYPERLIPIDEMIA TYPE: ICD-10-CM

## 2020-09-11 DIAGNOSIS — E11.8 TYPE II DIABETES MELLITUS WITH COMPLICATION (HCC): ICD-10-CM

## 2020-09-11 DIAGNOSIS — I10 ESSENTIAL (PRIMARY) HYPERTENSION: ICD-10-CM

## 2020-09-11 DIAGNOSIS — K21.9 GASTROESOPHAGEAL REFLUX DISEASE, ESOPHAGITIS PRESENCE NOT SPECIFIED: ICD-10-CM

## 2020-09-11 LAB
ALBUMIN SERPL-MCNC: 4.6 G/DL (ref 3.5–5.2)
ALBUMIN UR-MCNC: 1.3 MG/DL
ALBUMIN/GLOB SERPL: 1.2 G/DL
ALP SERPL-CCNC: 108 U/L (ref 39–117)
ALT SERPL W P-5'-P-CCNC: 11 U/L (ref 1–41)
ANION GAP SERPL CALCULATED.3IONS-SCNC: 10.9 MMOL/L (ref 5–15)
AST SERPL-CCNC: 16 U/L (ref 1–40)
BASOPHILS # BLD AUTO: 0.05 10*3/MM3 (ref 0–0.2)
BASOPHILS NFR BLD AUTO: 0.6 % (ref 0–1.5)
BILIRUB SERPL-MCNC: 0.4 MG/DL (ref 0–1.2)
BUN SERPL-MCNC: 15 MG/DL (ref 8–23)
BUN/CREAT SERPL: 12.3 (ref 7–25)
CALCIUM SPEC-SCNC: 9.2 MG/DL (ref 8.6–10.5)
CHLORIDE SERPL-SCNC: 101 MMOL/L (ref 98–107)
CHOLEST SERPL-MCNC: 205 MG/DL (ref 0–200)
CO2 SERPL-SCNC: 26.1 MMOL/L (ref 22–29)
CREAT SERPL-MCNC: 1.22 MG/DL (ref 0.76–1.27)
DEPRECATED RDW RBC AUTO: 41.7 FL (ref 37–54)
EOSINOPHIL # BLD AUTO: 0.37 10*3/MM3 (ref 0–0.4)
EOSINOPHIL NFR BLD AUTO: 4.6 % (ref 0.3–6.2)
ERYTHROCYTE [DISTWIDTH] IN BLOOD BY AUTOMATED COUNT: 12.9 % (ref 12.3–15.4)
GFR SERPL CREATININE-BSD FRML MDRD: 60 ML/MIN/1.73
GLOBULIN UR ELPH-MCNC: 3.7 GM/DL
GLUCOSE SERPL-MCNC: 83 MG/DL (ref 65–99)
HBA1C MFR BLD: 5.56 % (ref 4.8–5.6)
HCT VFR BLD AUTO: 41.1 % (ref 37.5–51)
HDLC SERPL-MCNC: 44 MG/DL (ref 40–60)
HGB BLD-MCNC: 14 G/DL (ref 13–17.7)
IMM GRANULOCYTES # BLD AUTO: 0.02 10*3/MM3 (ref 0–0.05)
IMM GRANULOCYTES NFR BLD AUTO: 0.3 % (ref 0–0.5)
LDLC SERPL CALC-MCNC: 140 MG/DL (ref 0–100)
LDLC/HDLC SERPL: 3.18 {RATIO}
LYMPHOCYTES # BLD AUTO: 1.73 10*3/MM3 (ref 0.7–3.1)
LYMPHOCYTES NFR BLD AUTO: 21.7 % (ref 19.6–45.3)
MCH RBC QN AUTO: 30.2 PG (ref 26.6–33)
MCHC RBC AUTO-ENTMCNC: 34.1 G/DL (ref 31.5–35.7)
MCV RBC AUTO: 88.6 FL (ref 79–97)
MONOCYTES # BLD AUTO: 0.63 10*3/MM3 (ref 0.1–0.9)
MONOCYTES NFR BLD AUTO: 7.9 % (ref 5–12)
NEUTROPHILS NFR BLD AUTO: 5.17 10*3/MM3 (ref 1.7–7)
NEUTROPHILS NFR BLD AUTO: 64.9 % (ref 42.7–76)
NRBC BLD AUTO-RTO: 0 /100 WBC (ref 0–0.2)
PLATELET # BLD AUTO: 253 10*3/MM3 (ref 140–450)
PMV BLD AUTO: 11.8 FL (ref 6–12)
POTASSIUM SERPL-SCNC: 4.3 MMOL/L (ref 3.5–5.2)
PROT SERPL-MCNC: 8.3 G/DL (ref 6–8.5)
RBC # BLD AUTO: 4.64 10*6/MM3 (ref 4.14–5.8)
SODIUM SERPL-SCNC: 138 MMOL/L (ref 136–145)
TRIGL SERPL-MCNC: 105 MG/DL (ref 0–150)
TSH SERPL DL<=0.05 MIU/L-ACNC: 2.17 UIU/ML (ref 0.27–4.2)
VIT B12 BLD-MCNC: 700 PG/ML (ref 211–946)
VLDLC SERPL-MCNC: 21 MG/DL (ref 5–40)
WBC # BLD AUTO: 7.97 10*3/MM3 (ref 3.4–10.8)

## 2020-09-11 PROCEDURE — 83036 HEMOGLOBIN GLYCOSYLATED A1C: CPT

## 2020-09-11 PROCEDURE — 36415 COLL VENOUS BLD VENIPUNCTURE: CPT

## 2020-09-11 PROCEDURE — 80061 LIPID PANEL: CPT

## 2020-09-11 PROCEDURE — 80053 COMPREHEN METABOLIC PANEL: CPT

## 2020-09-11 PROCEDURE — 82607 VITAMIN B-12: CPT

## 2020-09-11 PROCEDURE — 82043 UR ALBUMIN QUANTITATIVE: CPT

## 2020-09-11 PROCEDURE — 85025 COMPLETE CBC W/AUTO DIFF WBC: CPT

## 2020-09-11 PROCEDURE — 84443 ASSAY THYROID STIM HORMONE: CPT

## 2020-10-19 ENCOUNTER — LAB (OUTPATIENT)
Dept: LAB | Facility: HOSPITAL | Age: 65
End: 2020-10-19

## 2020-10-19 DIAGNOSIS — Z01.818 PREOP TESTING: Primary | ICD-10-CM

## 2020-10-19 PROCEDURE — U0003 INFECTIOUS AGENT DETECTION BY NUCLEIC ACID (DNA OR RNA); SEVERE ACUTE RESPIRATORY SYNDROME CORONAVIRUS 2 (SARS-COV-2) (CORONAVIRUS DISEASE [COVID-19]), AMPLIFIED PROBE TECHNIQUE, MAKING USE OF HIGH THROUGHPUT TECHNOLOGIES AS DESCRIBED BY CMS-2020-01-R: HCPCS

## 2020-10-19 PROCEDURE — C9803 HOPD COVID-19 SPEC COLLECT: HCPCS

## 2020-10-20 LAB
COVID LABCORP PRIORITY: NORMAL
SARS-COV-2 RNA RESP QL NAA+PROBE: NOT DETECTED

## 2020-10-22 ENCOUNTER — ANESTHESIA (OUTPATIENT)
Dept: GASTROENTEROLOGY | Facility: HOSPITAL | Age: 65
End: 2020-10-22

## 2020-10-22 ENCOUNTER — APPOINTMENT (OUTPATIENT)
Dept: GENERAL RADIOLOGY | Facility: HOSPITAL | Age: 65
End: 2020-10-22

## 2020-10-22 ENCOUNTER — HOSPITAL ENCOUNTER (OUTPATIENT)
Facility: HOSPITAL | Age: 65
Setting detail: HOSPITAL OUTPATIENT SURGERY
Discharge: HOME | End: 2020-10-22
Attending: INTERNAL MEDICINE | Admitting: INTERNAL MEDICINE

## 2020-10-22 ENCOUNTER — ANESTHESIA EVENT (OUTPATIENT)
Dept: GASTROENTEROLOGY | Facility: HOSPITAL | Age: 65
End: 2020-10-22

## 2020-10-22 VITALS
WEIGHT: 151 LBS | OXYGEN SATURATION: 96 % | HEIGHT: 73 IN | TEMPERATURE: 97.7 F | DIASTOLIC BLOOD PRESSURE: 72 MMHG | BODY MASS INDEX: 20.01 KG/M2 | RESPIRATION RATE: 16 BRPM | HEART RATE: 100 BPM | SYSTOLIC BLOOD PRESSURE: 157 MMHG

## 2020-10-22 DIAGNOSIS — R13.10 DIFFICULTY IN SWALLOWING: ICD-10-CM

## 2020-10-22 DIAGNOSIS — R13.13 PHARYNGEAL DYSPHAGIA: ICD-10-CM

## 2020-10-22 PROCEDURE — 88312 SPECIAL STAINS GROUP 1: CPT

## 2020-10-22 PROCEDURE — 25010000002 PROPOFOL 10 MG/ML EMULSION: Performed by: NURSE ANESTHETIST, CERTIFIED REGISTERED

## 2020-10-22 PROCEDURE — 88305 TISSUE EXAM BY PATHOLOGIST: CPT

## 2020-10-22 PROCEDURE — 25010000002 DEXAMETHASONE PER 1 MG: Performed by: INTERNAL MEDICINE

## 2020-10-22 PROCEDURE — 94799 UNLISTED PULMONARY SVC/PX: CPT

## 2020-10-22 PROCEDURE — 94640 AIRWAY INHALATION TREATMENT: CPT

## 2020-10-22 PROCEDURE — 71045 X-RAY EXAM CHEST 1 VIEW: CPT

## 2020-10-22 PROCEDURE — 94760 N-INVAS EAR/PLS OXIMETRY 1: CPT

## 2020-10-22 RX ORDER — DEXTROSE AND SODIUM CHLORIDE 5; .45 G/100ML; G/100ML
30 INJECTION, SOLUTION INTRAVENOUS CONTINUOUS PRN
Status: DISCONTINUED | OUTPATIENT
Start: 2020-10-22 | End: 2020-10-22 | Stop reason: HOSPADM

## 2020-10-22 RX ORDER — ALBUTEROL SULFATE 2.5 MG/3ML
2.5 SOLUTION RESPIRATORY (INHALATION) ONCE
Status: COMPLETED | OUTPATIENT
Start: 2020-10-22 | End: 2020-10-22

## 2020-10-22 RX ORDER — ONDANSETRON 2 MG/ML
4 INJECTION INTRAMUSCULAR; INTRAVENOUS ONCE AS NEEDED
Status: DISCONTINUED | OUTPATIENT
Start: 2020-10-22 | End: 2020-10-22 | Stop reason: HOSPADM

## 2020-10-22 RX ORDER — ATENOLOL 25 MG/1
25 TABLET ORAL ONCE
Status: COMPLETED | OUTPATIENT
Start: 2020-10-22 | End: 2020-10-22

## 2020-10-22 RX ORDER — LIDOCAINE HYDROCHLORIDE 20 MG/ML
INJECTION, SOLUTION EPIDURAL; INFILTRATION; INTRACAUDAL; PERINEURAL AS NEEDED
Status: DISCONTINUED | OUTPATIENT
Start: 2020-10-22 | End: 2020-10-22 | Stop reason: SURG

## 2020-10-22 RX ORDER — PROPOFOL 10 MG/ML
VIAL (ML) INTRAVENOUS AS NEEDED
Status: DISCONTINUED | OUTPATIENT
Start: 2020-10-22 | End: 2020-10-22 | Stop reason: SURG

## 2020-10-22 RX ORDER — ESMOLOL HYDROCHLORIDE 10 MG/ML
INJECTION INTRAVENOUS AS NEEDED
Status: DISCONTINUED | OUTPATIENT
Start: 2020-10-22 | End: 2020-10-22 | Stop reason: SURG

## 2020-10-22 RX ORDER — DEXAMETHASONE SODIUM PHOSPHATE 4 MG/ML
8 INJECTION, SOLUTION INTRA-ARTICULAR; INTRALESIONAL; INTRAMUSCULAR; INTRAVENOUS; SOFT TISSUE ONCE
Status: COMPLETED | OUTPATIENT
Start: 2020-10-22 | End: 2020-10-22

## 2020-10-22 RX ADMIN — PROPOFOL 20 MG: 10 INJECTION, EMULSION INTRAVENOUS at 08:32

## 2020-10-22 RX ADMIN — ATENOLOL 25 MG: 25 TABLET ORAL at 10:38

## 2020-10-22 RX ADMIN — ALBUTEROL SULFATE 2.5 MG: 2.5 SOLUTION RESPIRATORY (INHALATION) at 10:37

## 2020-10-22 RX ADMIN — PROPOFOL 80 MG: 10 INJECTION, EMULSION INTRAVENOUS at 08:30

## 2020-10-22 RX ADMIN — ALBUTEROL SULFATE 2.5 MG: 2.5 SOLUTION RESPIRATORY (INHALATION) at 09:12

## 2020-10-22 RX ADMIN — LIDOCAINE HYDROCHLORIDE 80 MG: 20 INJECTION, SOLUTION EPIDURAL; INFILTRATION; INTRACAUDAL; PERINEURAL at 08:30

## 2020-10-22 RX ADMIN — PROPOFOL 20 MG: 10 INJECTION, EMULSION INTRAVENOUS at 08:34

## 2020-10-22 RX ADMIN — ESMOLOL HYDROCHLORIDE 10 MG: 10 INJECTION, SOLUTION INTRAVENOUS at 09:20

## 2020-10-22 RX ADMIN — ALBUTEROL SULFATE 2.5 MG: 2.5 SOLUTION RESPIRATORY (INHALATION) at 11:31

## 2020-10-22 RX ADMIN — DEXTROSE AND SODIUM CHLORIDE 30 ML/HR: 5; 450 INJECTION, SOLUTION INTRAVENOUS at 07:55

## 2020-10-22 RX ADMIN — DEXAMETHASONE SODIUM PHOSPHATE 8 MG: 4 INJECTION, SOLUTION INTRAMUSCULAR; INTRAVENOUS at 09:13

## 2020-10-22 RX ADMIN — PROPOFOL 30 MG: 10 INJECTION, EMULSION INTRAVENOUS at 08:36

## 2020-10-22 NOTE — NURSING NOTE
Pt up walking halls with dr scanlon and adam   Pt tolerated ok  A little short winded but sats well.  New order received for another breathing treatment and then discharge   new order explained to pt

## 2020-10-22 NOTE — ANESTHESIA PREPROCEDURE EVALUATION
Anesthesia Evaluation     Patient summary reviewed and Nursing notes reviewed     NPO Liquid Status: > 8 hours           Airway   Mallampati: II  TM distance: >3 FB  Neck ROM: full  No difficulty expected  Dental    (+) edentulous    Pulmonary - normal exam   (+) a smoker Former,   Cardiovascular - normal exam    ECG reviewed  PT is on anticoagulation therapy    (+) hypertension well controlled 2 medications or greater, past MI  >12 months, hyperlipidemia,       Neuro/Psych  (+) CVA,     GI/Hepatic/Renal/Endo - negative ROS     Musculoskeletal     Abdominal  - normal exam   Substance History - negative use     OB/GYN          Other - negative ROS                       Anesthesia Plan    ASA 3     MAC     intravenous induction     Anesthetic plan, all risks, benefits, and alternatives have been provided, discussed and informed consent has been obtained with: patient.

## 2020-10-22 NOTE — NURSING NOTE
DR KIRKLAND AND CRNA NOTIFIED OF PT NOT FEELING WELL AND HAVING DIFFICULTY BREATHING.  SATS IN THE 70'S WITH MASK   NEW ORDERS RECEIVED AND EXPLAINED TO PATIENT

## 2020-10-22 NOTE — NURSING NOTE
crna to see pt   Pt doing better   Got short of breath after sitting on side of bed to use urinal with o2 on.   New orders received.

## 2020-10-22 NOTE — H&P
Garo Kirkland DO,[facility]  Gastroenterology  Hepatology  Endoscopy  Board Certified in Internal Medicine and gastroenterology  [address], [address]  [place]. [address]  - [phone]   F - [phone]     GASTROENTEROLOGY HISTORY AND PHYSICAL  NOTE   GARO KIRKLAND DO.         SUBJECTIVE:   10/22/2020    Name: Aubrey Montilla  DOD: 1955    Chief Complaint:       Subjective : Dysphagia, pharyngeal area    Patient is 65 y.o. male presents with desire for elective EGD with dilation of the esophagus and biopsy as required.      ROS/HISTORY/ CURRENT MEDICATIONS/OBJECTIVE/VS/PE:   Review of Systems:  All systems unremarkable unless specified below.  Constitutional   HENT  Eyes   Respiratory    Cardiovascular  Gastrointestinal   Endocrine  Genitourinary    Musculoskeletal   Skin  Allergic/Immunologic    Neurological    Hematological  Psychiatric/Behavioral    History:     Past Medical History:   Diagnosis Date   • Elevated cholesterol    • Hypertension      Past Surgical History:   Procedure Laterality Date   • ENDOSCOPY N/A 8/29/2017    Procedure: ESOPHAGOGASTRODUODENOSCOPY;  Surgeon: Garo Kirkland DO;  Location: [facility] ENDOSCOPY;  Service:    • ENDOSCOPY N/A 6/11/2019    Procedure: ESOPHAGOGASTRODUODENOSCOPY;  Surgeon: Garo Kirkland DO;  Location: [facility] ENDOSCOPY;  Service: Gastroenterology     History reviewed. No pertinent family history.  Social History     Tobacco Use   • Smoking status: Former Smoker   • Smokeless tobacco: Never Used   Substance Use Topics   • Alcohol use: Not on file   • Drug use: Not on file     Prior to Admission medications    Medication Sig Start Date End Date Taking? Authorizing Provider   albuterol (ACCUNEB) 1.25 MG/3ML nebulizer solution Take 1 ampule by nebulization Every 6 (Six) Hours As Needed for Wheezing.   Yes Provider, MD Star   albuterol (PROVENTIL HFA;VENTOLIN HFA) 108 (90 Base) MCG/ACT inhaler Inhale 2 puffs Every 4 (Four) Hours As  Needed for Wheezing.   Yes Star Raman MD   atenolol (TENORMIN) 25 MG tablet Take 1 tablet by mouth Daily. 1/7/20  Yes Juliet Hazel APRN   atorvastatin (LIPITOR) 40 MG tablet Take 1 tablet by mouth Every Night. 1/6/20  Yes Juliet Hazel APRN   budesonide-formoterol (SYMBICORT) 160-4.5 MCG/ACT inhaler Inhale 2 puffs 2 (Two) Times a Day.   Yes Star Raman MD   clopidogrel (PLAVIX) 75 MG tablet Take 75 mg by mouth Daily.   Yes Star Raman MD   docusate sodium 100 MG capsule Take 100 mg by mouth Daily. 1/7/20  Yes Juliet Hazel APRN   gabapentin (NEURONTIN) 100 MG capsule Take 2 capsules by mouth 3 (Three) Times a Day. 1/6/20  Yes Juliet Hazel APRN   hydrOXYzine (ATARAX) 25 MG tablet Take 25 mg by mouth 3 (Three) Times a Day As Needed for Itching.   Yes Star Raman MD   sertraline (ZOLOFT) 100 MG tablet Take 100 mg by mouth Daily.   Yes Star Raman MD   aspirin 81 MG chewable tablet Chew 81 mg Daily.    Star Raman MD   cyclobenzaprine (FLEXERIL) 10 MG tablet Take 10 mg by mouth 3 (Three) Times a Day As Needed for Muscle Spasms.    Star Raman MD     Allergies:  Contrast dye    I have reviewed the patients medical history, surgical history and family history in the available medical record system.     Current Medications:     Current Facility-Administered Medications   Medication Dose Route Frequency Provider Last Rate Last Dose   • dextrose 5 % and sodium chloride 0.45 % infusion  30 mL/hr Intravenous Continuous PRN Van Khoury DO 30 mL/hr at 10/22/20 0755 30 mL/hr at 10/22/20 0755       Objective     Physical Exam:   Temp:  [96.8 °F (36 °C)] 96.8 °F (36 °C)  Heart Rate:  [84] 84  Resp:  [16] 16  BP: (126)/(81) 126/81    Physical Exam:  General Appearance:    Alert, cooperative, in no acute distress   Head:    Normocephalic, without obvious abnormality, atraumatic   Eyes:            Lids and lashes normal, conjunctivae and sclerae  normal, no   icterus, no pallor, corneas clear, PERRLA   Ears:    Ears appear intact with no abnormalities noted   Throat:   No oral lesions, no thrush, oral mucosa moist   Neck:   No adenopathy, supple, trachea midline, no thyromegaly, no     carotid bruit, no JVD   Back:     No kyphosis present, no scoliosis present, no skin lesions,       erythema or scars, no tenderness to percussion or                   palpation,   range of motion normal   Lungs:     Clear to auscultation,respirations regular, even and                   unlabored    Heart:    Regular rhythm and normal rate, normal S1 and S2, no            murmur, no gallop, no rub, no click   Breast Exam:    Deferred   Abdomen:     Normal bowel sounds, no masses, no organomegaly, soft        non-tender, non-distended, no guarding, no rebound                 tenderness   Genitalia:    Deferred   Extremities:   Moves all extremities well, no edema, no cyanosis, no              redness   Pulses:   Pulses palpable and equal bilaterally   Skin:   No bleeding, bruising or rash   Lymph nodes:   No palpable adenopathy   Neurologic:   Cranial nerves 2 - 12 grossly intact, sensation intact, DTR        present and equal bilaterally      Results Review:     Lab Results   Component Value Date    WBC 7.97 09/11/2020    WBC 12.87 (H) 01/04/2020    WBC 12.80 (H) 01/02/2020    HGB 14.0 09/11/2020    HGB 10.5 (L) 01/04/2020    HGB 10.8 (L) 01/02/2020    HCT 41.1 09/11/2020    HCT 32.4 (L) 01/04/2020    HCT 32.9 (L) 01/02/2020     09/11/2020     01/04/2020     01/02/2020             No results found for: LIPASE  Lab Results   Component Value Date    INR 1.19 01/01/2020    INR 1.0 10/27/2015     No results found for: CULTURE    Radiology Review:  Imaging Results (Last 72 Hours)     ** No results found for the last 72 hours. **           I reviewed the patient's new clinical results.  I reviewed the patient's new imaging results and agree with the  interpretation.     ASSESSMENT/PLAN:   ASSESSMENT:  1.  Esophageal stricture    PLAN:  1.  Esophagogastroduodenoscopy with dilation of the esophagus and biopsy as required    Risk and benefits associated with the procedure are reviewed with the patient.  The patient wished to proceed     Van Khoury DO  10/22/20  08:08 CDT

## 2020-10-22 NOTE — ANESTHESIA POSTPROCEDURE EVALUATION
Patient: Aubrey Montilla    Procedure Summary     Date: 10/22/20 Room / Location: [facility] ENDOSCOPY 2 / [facility] ENDOSCOPY    Anesthesia Start: 0825 Anesthesia Stop: 0848    Procedure: ESOPHAGOGASTRODUODENOSCOPY (N/A ) Diagnosis:       Esophageal stricture      Hiatal hernia      (Difficulty in swallowing [R13.10])      (Pharyngeal dysphagia [R13.13])    Surgeon: Van Khoury DO Provider: Mary Farnsworth CRNA    Anesthesia Type: MAC ASA Status: 3          Anesthesia Type: MAC    Vitals  No vitals data found for the desired time range.          Post Anesthesia Care and Evaluation    Patient location during evaluation: bedside  Patient participation: complete - patient participated  Level of consciousness: sleepy but conscious  Pain score: 0  Pain management: adequate  Airway patency: patent  Anesthetic complications: No anesthetic complications  PONV Status: none  Cardiovascular status: acceptable  Respiratory status: acceptable, spontaneous ventilation and unassisted  Hydration status: acceptable

## 2020-10-26 LAB
LAB AP CASE REPORT: NORMAL
PATH REPORT.FINAL DX SPEC: NORMAL

## 2021-07-27 NOTE — DISCHARGE INSTRUCTIONS
Anesthesia Pre Eval Note    Anesthesia ROS/Med Hx    Overall Review:  EKG was reviewed and Echo was reviewed     Anesthetic Complication History:  Patient does not have a history of anesthetic complications      Pulmonary Review:  Positive for pneumonia    Neuro/Psych Review:  Patient does not have a neuro/psych history       Cardiovascular Review:    Positive for CHF  Positive for hypertension  Positive for hyperlipidemia    GI/HEPATIC/RENAL Review:    Positive for GERD    End/Other Review:  Positive for diabetes - type 2  Positive for cancer    Overall Review of Systems Comments:  EKG :  Normal sinus rhythm   Minimal voltage criteria for LVH, may be normal variant.  ECHO : EF :45 %. On metoprolol.    Diagnosis Date  • Chronic pain    • Congestive cardiac failure (CMS/HCC)    • Essential (primary) hypertension    • Gastroesophageal reflux disease    • Hx SBO 2020  • Malignant neoplasm (CMS/HCC)    • Pneumonia                  Additional Results:     ALLERGIES:   -- Cefazolin -- NAUSEA    --  Subsided with zofran.        Relevant Problems   No relevant active problems       Physical Exam     Airway   Mallampati: II  TM Distance: >3 FB  Neck ROM: Full    Cardiovascular  Cardiovascular exam normal    Dental Exam  Dental exam normal    Pulmonary Exam  Pulmonary exam normal    Abdominal Exam  Abdominal exam normal      Anesthesia Plan:  Anesthesia Plan  No phone call attempted due to:  ASA Status: 3    Anesthesia Type: General    Induction: Intravenous  Preferred Airway Type: LMA  Maintenance: Inhalational  Premedication: IV      Post-op Pain Management: Per Surgeon      Checklist  Reviewed: Lab Results, EKG, Patient Summary, NPO Status, Consultations, Past Med History, Allergies, Beta Blocker Status, Chest X-Rays, Outside Records, Medications, Nursing Notes, Care Everywhere and Problem list  Consent/Risks Discussed Statement:  The proposed anesthetic plan, including its risks and benefits, have been discussed with the  Outpatient Instructions for Monitored Anesthesia Care (MAC)    1. You will be released from the hospital in the care of a responsible adult who should remain with you for at least 6 hours.    2. You are at an increased risk for falls following anesthesia. Use care when changing from a lying to a sitting position. Use your assistive devices ( example: cane, walker or family member).    3. You must NOT drive a car, climb high places such as a ladder, or operate equipment such as electric knives,stoves etc...for at least 12 hours. If you are dizzy for longer than 24 hours, notify your doctor.    4. DO NOT drink any alcoholic beverages for at least 24 hours. Anesthesia may impair your judgement.    5. If you smoke, do not smoke alone due to increased risk of burns/fires.    6. DO NOT undertake any legally binding commitment for at least 24 hours. Anesthesia may impair your judgement.    Patient along with the risks and benefits of alternatives. Questions were encouraged and answered and the patient and/or representative understands and agrees to proceed.    I have discussed elements of the patient's history or examination, as noted above and/or as follows, that place the patient at higher risk of complications; age and heart disease.    I discussed with the patient (and/or patient's legal representative) the risks and benefits of the proposed anesthesia plan, General, which may include services performed by other anesthesia providers.    Alternative anesthesia plans, if available, were reviewed with the patient (and/or patient's legal representative). Discussion has been held with the patient (and/or patient's legal representative) regarding risks of anesthesia, which include allergic reaction, aspiration, back pain, bleeding/hematoma, dental injury, depressed breathing, nausea, vomiting, emergence delirium, eye injury, headache, hypotension, ICU admit, infection, intra-operative awareness, nerve injury, oral injury, organ damage, persistent pain, post-op intubation, sore throat and memory loss and emergent situations that may require change in anesthesia plan.    The patient (and/or patient's legal representative) has indicated understanding, his/her questions have been answered, and he/she wishes to proceed with the planned anesthetic.    Blood Products: Not Anticipated

## 2021-09-30 ENCOUNTER — HOSPITAL ENCOUNTER (EMERGENCY)
Facility: HOSPITAL | Age: 66
Discharge: HOME | End: 2021-09-30
Attending: EMERGENCY MEDICINE | Admitting: EMERGENCY MEDICINE

## 2021-09-30 VITALS
HEART RATE: 78 BPM | TEMPERATURE: 97.8 F | RESPIRATION RATE: 18 BRPM | SYSTOLIC BLOOD PRESSURE: 153 MMHG | DIASTOLIC BLOOD PRESSURE: 85 MMHG | WEIGHT: 148.2 LBS | HEIGHT: 73 IN | BODY MASS INDEX: 19.64 KG/M2 | OXYGEN SATURATION: 98 %

## 2021-09-30 DIAGNOSIS — R31.9 HEMATURIA, UNSPECIFIED TYPE: Primary | ICD-10-CM

## 2021-09-30 LAB
ALBUMIN SERPL-MCNC: 4.5 G/DL (ref 3.5–5.2)
ALBUMIN/GLOB SERPL: 1.3 G/DL
ALP SERPL-CCNC: 95 U/L (ref 39–117)
ALT SERPL W P-5'-P-CCNC: 8 U/L (ref 1–41)
ANION GAP SERPL CALCULATED.3IONS-SCNC: 8 MMOL/L (ref 5–15)
AST SERPL-CCNC: 15 U/L (ref 1–40)
BASOPHILS # BLD AUTO: 0.08 10*3/MM3 (ref 0–0.2)
BASOPHILS NFR BLD AUTO: 1.1 % (ref 0–1.5)
BILIRUB SERPL-MCNC: 0.5 MG/DL (ref 0–1.2)
BILIRUB UR QL STRIP: ABNORMAL
BUN SERPL-MCNC: 13 MG/DL (ref 8–23)
BUN/CREAT SERPL: 10.6 (ref 7–25)
CALCIUM SPEC-SCNC: 9.3 MG/DL (ref 8.6–10.5)
CHLORIDE SERPL-SCNC: 102 MMOL/L (ref 98–107)
CLARITY UR: CLEAR
CO2 SERPL-SCNC: 29 MMOL/L (ref 22–29)
COLOR UR: YELLOW
CREAT SERPL-MCNC: 1.23 MG/DL (ref 0.76–1.27)
DEPRECATED RDW RBC AUTO: 41.1 FL (ref 37–54)
EOSINOPHIL # BLD AUTO: 0.21 10*3/MM3 (ref 0–0.4)
EOSINOPHIL NFR BLD AUTO: 2.9 % (ref 0.3–6.2)
ERYTHROCYTE [DISTWIDTH] IN BLOOD BY AUTOMATED COUNT: 12.4 % (ref 12.3–15.4)
GFR SERPL CREATININE-BSD FRML MDRD: 59 ML/MIN/1.73
GLOBULIN UR ELPH-MCNC: 3.6 GM/DL
GLUCOSE SERPL-MCNC: 94 MG/DL (ref 65–99)
GLUCOSE UR STRIP-MCNC: NEGATIVE MG/DL
HCT VFR BLD AUTO: 44.8 % (ref 37.5–51)
HGB BLD-MCNC: 14.4 G/DL (ref 13–17.7)
HGB UR QL STRIP.AUTO: NEGATIVE
HOLD SPECIMEN: NORMAL
IMM GRANULOCYTES # BLD AUTO: 0.01 10*3/MM3 (ref 0–0.05)
IMM GRANULOCYTES NFR BLD AUTO: 0.1 % (ref 0–0.5)
KETONES UR QL STRIP: NEGATIVE
LEUKOCYTE ESTERASE UR QL STRIP.AUTO: NEGATIVE
LYMPHOCYTES # BLD AUTO: 1.57 10*3/MM3 (ref 0.7–3.1)
LYMPHOCYTES NFR BLD AUTO: 21.4 % (ref 19.6–45.3)
MCH RBC QN AUTO: 29 PG (ref 26.6–33)
MCHC RBC AUTO-ENTMCNC: 32.1 G/DL (ref 31.5–35.7)
MCV RBC AUTO: 90.3 FL (ref 79–97)
MONOCYTES # BLD AUTO: 0.58 10*3/MM3 (ref 0.1–0.9)
MONOCYTES NFR BLD AUTO: 7.9 % (ref 5–12)
NEUTROPHILS NFR BLD AUTO: 4.89 10*3/MM3 (ref 1.7–7)
NEUTROPHILS NFR BLD AUTO: 66.6 % (ref 42.7–76)
NITRITE UR QL STRIP: NEGATIVE
NRBC BLD AUTO-RTO: 0 /100 WBC (ref 0–0.2)
PH UR STRIP.AUTO: 5.5 [PH] (ref 5–9)
PLATELET # BLD AUTO: 257 10*3/MM3 (ref 140–450)
PMV BLD AUTO: 10.8 FL (ref 6–12)
POTASSIUM SERPL-SCNC: 4.4 MMOL/L (ref 3.5–5.2)
PROT SERPL-MCNC: 8.1 G/DL (ref 6–8.5)
PROT UR QL STRIP: NEGATIVE
RBC # BLD AUTO: 4.96 10*6/MM3 (ref 4.14–5.8)
SODIUM SERPL-SCNC: 139 MMOL/L (ref 136–145)
SP GR UR STRIP: 1.02 (ref 1–1.03)
UROBILINOGEN UR QL STRIP: ABNORMAL
WBC # BLD AUTO: 7.34 10*3/MM3 (ref 3.4–10.8)

## 2021-09-30 PROCEDURE — 81003 URINALYSIS AUTO W/O SCOPE: CPT | Performed by: PHYSICIAN ASSISTANT

## 2021-09-30 PROCEDURE — 80053 COMPREHEN METABOLIC PANEL: CPT | Performed by: PHYSICIAN ASSISTANT

## 2021-09-30 PROCEDURE — 85025 COMPLETE CBC W/AUTO DIFF WBC: CPT | Performed by: PHYSICIAN ASSISTANT

## 2021-09-30 PROCEDURE — 99283 EMERGENCY DEPT VISIT LOW MDM: CPT

## 2021-09-30 RX ORDER — SODIUM CHLORIDE 0.9 % (FLUSH) 0.9 %
10 SYRINGE (ML) INJECTION AS NEEDED
Status: DISCONTINUED | OUTPATIENT
Start: 2021-09-30 | End: 2021-09-30 | Stop reason: HOSPADM

## 2021-10-25 ENCOUNTER — HOSPITAL ENCOUNTER (OUTPATIENT)
Dept: CT IMAGING | Facility: HOSPITAL | Age: 66
Discharge: HOME | End: 2021-10-25
Admitting: INTERNAL MEDICINE

## 2021-10-25 DIAGNOSIS — I73.9 PERIPHERAL VASCULAR DISEASE, UNSPECIFIED (HCC): ICD-10-CM

## 2021-10-25 PROCEDURE — 75635 CT ANGIO ABDOMINAL ARTERIES: CPT

## 2021-10-25 PROCEDURE — 0 IOPAMIDOL PER 1 ML: Performed by: INTERNAL MEDICINE

## 2021-10-25 RX ADMIN — IOPAMIDOL 90 ML: 755 INJECTION, SOLUTION INTRAVENOUS at 12:13

## 2022-06-09 ENCOUNTER — HOSPITAL ENCOUNTER (OUTPATIENT)
Facility: HOSPITAL | Age: 67
Setting detail: HOSPITAL OUTPATIENT SURGERY
Discharge: HOME | End: 2022-06-09
Attending: INTERNAL MEDICINE | Admitting: INTERNAL MEDICINE

## 2022-06-09 ENCOUNTER — ANESTHESIA (OUTPATIENT)
Dept: GASTROENTEROLOGY | Facility: HOSPITAL | Age: 67
End: 2022-06-09

## 2022-06-09 ENCOUNTER — ANESTHESIA EVENT (OUTPATIENT)
Dept: GASTROENTEROLOGY | Facility: HOSPITAL | Age: 67
End: 2022-06-09

## 2022-06-09 VITALS
TEMPERATURE: 97.1 F | BODY MASS INDEX: 18.58 KG/M2 | HEIGHT: 73 IN | OXYGEN SATURATION: 97 % | HEART RATE: 85 BPM | DIASTOLIC BLOOD PRESSURE: 65 MMHG | RESPIRATION RATE: 18 BRPM | SYSTOLIC BLOOD PRESSURE: 144 MMHG | WEIGHT: 140.2 LBS

## 2022-06-09 PROCEDURE — C1769 GUIDE WIRE: HCPCS | Performed by: INTERNAL MEDICINE

## 2022-06-09 PROCEDURE — 25010000002 PROPOFOL 10 MG/ML EMULSION: Performed by: NURSE ANESTHETIST, CERTIFIED REGISTERED

## 2022-06-09 RX ORDER — DEXTROSE AND SODIUM CHLORIDE 5; .45 G/100ML; G/100ML
30 INJECTION, SOLUTION INTRAVENOUS CONTINUOUS PRN
Status: DISCONTINUED | OUTPATIENT
Start: 2022-06-09 | End: 2022-06-09 | Stop reason: HOSPADM

## 2022-06-09 RX ORDER — LIDOCAINE HYDROCHLORIDE 20 MG/ML
INJECTION, SOLUTION EPIDURAL; INFILTRATION; INTRACAUDAL; PERINEURAL AS NEEDED
Status: DISCONTINUED | OUTPATIENT
Start: 2022-06-09 | End: 2022-06-09 | Stop reason: SURG

## 2022-06-09 RX ORDER — PROPOFOL 10 MG/ML
VIAL (ML) INTRAVENOUS AS NEEDED
Status: DISCONTINUED | OUTPATIENT
Start: 2022-06-09 | End: 2022-06-09 | Stop reason: SURG

## 2022-06-09 RX ADMIN — DEXTROSE AND SODIUM CHLORIDE 30 ML/HR: 5; 450 INJECTION, SOLUTION INTRAVENOUS at 12:20

## 2022-06-09 RX ADMIN — PROPOFOL 30 MG: 10 INJECTION, EMULSION INTRAVENOUS at 14:24

## 2022-06-09 RX ADMIN — PROPOFOL 70 MG: 10 INJECTION, EMULSION INTRAVENOUS at 14:20

## 2022-06-09 RX ADMIN — LIDOCAINE HYDROCHLORIDE 40 MG: 20 INJECTION, SOLUTION EPIDURAL; INFILTRATION; INTRACAUDAL; PERINEURAL at 14:19

## 2022-06-09 RX ADMIN — PROPOFOL 20 MG: 10 INJECTION, EMULSION INTRAVENOUS at 14:22

## 2022-06-09 NOTE — ANESTHESIA POSTPROCEDURE EVALUATION
Patient: Aubrey Montilla    Procedure Summary     Date: 06/09/22 Room / Location: [facility] ENDOSCOPY 2 / [facility] ENDOSCOPY    Anesthesia Start: 1419 Anesthesia Stop: 1427    Procedure: ESOPHAGOGASTRODUODENOSCOPY (N/A ) Diagnosis:       Problems with swallowing and mastication      (Problems with swallowing and mastication [R13.10])    Surgeons: Van Khoury DO Provider: Omar Ding CRNA    Anesthesia Type: MAC ASA Status: 3          Anesthesia Type: MAC    Vitals  No vitals data found for the desired time range.          Post Anesthesia Care and Evaluation    Patient location during evaluation: bedside  Patient participation: complete - patient participated  Level of consciousness: awake and awake and alert  Pain score: 0  Pain management: adequate    Airway patency: patent  Anesthetic complications: No anesthetic complications  PONV Status: none  Cardiovascular status: acceptable and stable  Respiratory status: acceptable, room air and spontaneous ventilation  Hydration status: acceptable    Comments: BP:  185/88  HR:  84  SAT:  92  RR:  18  TEMP: 97.0

## 2022-06-09 NOTE — ANESTHESIA PREPROCEDURE EVALUATION
Anesthesia Evaluation     Patient summary reviewed and Nursing notes reviewed   NPO Solid Status: > 8 hours  NPO Liquid Status: > 2 hours           Airway   Mallampati: II  TM distance: >3 FB  Neck ROM: full  No difficulty expected  Dental    (+) edentulous    Pulmonary - normal exam   (+) a smoker Former,   Cardiovascular - normal exam    ECG reviewed  PT is on anticoagulation therapy    (+) hypertension well controlled 2 medications or greater, past MI  >12 months, hyperlipidemia,       Neuro/Psych  (+) CVA,    GI/Hepatic/Renal/Endo - negative ROS     Musculoskeletal     Abdominal  - normal exam   Substance History - negative use     OB/GYN          Other - negative ROS                         Anesthesia Plan    ASA 3     MAC     intravenous induction     Anesthetic plan, risks, benefits, and alternatives have been provided, discussed and informed consent has been obtained with: patient.

## 2022-06-09 NOTE — H&P
Garo Kirkland DO,[facility]  Gastroenterology  Hepatology  Endoscopy  Board Certified in Internal Medicine and gastroenterology  [address], [address]  [place]. [address]  T- [phone]   F - [phone]     GASTROENTEROLOGY HISTORY AND PHYSICAL  NOTE   GARO KIRKLAND DO.         SUBJECTIVE:   6/9/2022    Name: Aubrey Montilla  DOD: 1955        Chief Complaint:       Subjective : Dysphagia.  Personal history of esophageal stricture    Patient is 66 y.o. male presents with desire for elective EGD with dilation of the esophagus.      ROS/HISTORY/ CURRENT MEDICATIONS/OBJECTIVE/VS/PE:   Review of Systems:  All systems unremarkable unless specified below.  Constitutional   HENT  Eyes   Respiratory    Cardiovascular  Gastrointestinal   Endocrine  Genitourinary    Musculoskeletal   Skin  Allergic/Immunologic    Neurological    Hematological  Psychiatric/Behavioral    History:     Past Medical History:   Diagnosis Date   • COPD (chronic obstructive pulmonary disease) (HCC)    • Elevated cholesterol    • Hypertension    • Myocardial infarct (HCC) 2003   • Stroke (HCC) 2015    3-4 strokes in past; last one 2015--has affected memory/balance   • Weight decreasing     over last 3 weeks     Past Surgical History:   Procedure Laterality Date   • ENDOSCOPY N/A 8/29/2017    Procedure: ESOPHAGOGASTRODUODENOSCOPY;  Surgeon: Garo Kirkland DO;  Location: [facility] ENDOSCOPY;  Service:    • ENDOSCOPY N/A 6/11/2019    Procedure: ESOPHAGOGASTRODUODENOSCOPY;  Surgeon: Garo Kirkland DO;  Location: [facility] ENDOSCOPY;  Service: Gastroenterology   • ENDOSCOPY N/A 10/22/2020    Procedure: ESOPHAGOGASTRODUODENOSCOPY;  Surgeon: Garo Kirkland DO;  Location: [facility] ENDOSCOPY;  Service: Gastroenterology;  Laterality: N/A;  savory dilation 42-48     History reviewed. No pertinent family history.  Social History     Tobacco Use   • Smoking status: Former Smoker   • Smokeless tobacco: Never Used   Vaping Use   •  Vaping Use: Never used   Substance Use Topics   • Alcohol use: Not Currently   • Drug use: Never     Prior to Admission medications    Medication Sig Start Date End Date Taking? Authorizing Provider   albuterol (PROVENTIL HFA;VENTOLIN HFA) 108 (90 Base) MCG/ACT inhaler Inhale 2 puffs Every 4 (Four) Hours As Needed for Wheezing.   Yes Star Raman MD   atenolol (TENORMIN) 25 MG tablet Take 1 tablet by mouth Daily. 1/7/20  Yes Juliet Anderson APRN   atorvastatin (LIPITOR) 40 MG tablet Take 1 tablet by mouth Every Night. 1/6/20  Yes Juliet Anderson APRN   budesonide-formoterol (SYMBICORT) 160-4.5 MCG/ACT inhaler Inhale 2 puffs 2 (Two) Times a Day.   Yes Star Raman MD   cyclobenzaprine (FLEXERIL) 10 MG tablet Take 10 mg by mouth 3 (Three) Times a Day As Needed for Muscle Spasms.   Yes Star Raman MD   docusate sodium 100 MG capsule Take 100 mg by mouth Daily. 1/7/20  Yes Juliet Anderson APRN   gabapentin (NEURONTIN) 100 MG capsule Take 2 capsules by mouth 3 (Three) Times a Day. 1/6/20  Yes Juliet Anderson APRN   hydrOXYzine (ATARAX) 25 MG tablet Take 25 mg by mouth 3 (Three) Times a Day As Needed for Itching.   Yes Star Raman MD   albuterol (ACCUNEB) 1.25 MG/3ML nebulizer solution Take 1 ampule by nebulization Every 6 (Six) Hours As Needed for Wheezing.    ProviderStar MD     Allergies:  Contrast dye    I have reviewed the patients medical history, surgical history and family history in the available medical record system.     Current Medications:     Current Facility-Administered Medications   Medication Dose Route Frequency Provider Last Rate Last Admin   • dextrose 5 % and sodium chloride 0.45 % infusion  30 mL/hr Intravenous Continuous PRN Van Khoury DO 30 mL/hr at 06/09/22 1220 30 mL/hr at 06/09/22 1220       Objective     Physical Exam:   Temp:  [98.7 °F (37.1 °C)] 98.7 °F (37.1 °C)  Heart Rate:  [79] 79  Resp:  [18] 18  BP: (155)/(77)  155/77    Physical Exam:  General Appearance:    Alert, cooperative, in no acute distress   Head:    Normocephalic, without obvious abnormality, atraumatic   Eyes:            Lids and lashes normal, conjunctivae and sclerae normal, no icterus, no pallor, corneas clear, PERRLA   Ears:    Ears appear intact with no abnormalities noted   Throat:   No oral lesions, no thrush, oral mucosa moist   Neck:   No adenopathy, supple, trachea midline, no thyromegaly, no  carotid bruit, no JVD   Back:     No kyphosis present, no scoliosis present, no skin lesions,   erythema or scars, no tenderness to percussion or                 palpation,  range of motion normal   Lungs:     Clear to auscultation,respirations regular, even and         unlabored    Heart:    Regular rhythm and normal rate, normal S1 and S2, no  murmur, no gallop, no rub, no click   Breast Exam:    Deferred   Abdomen:     Normal bowel sounds, no masses, no organomegaly, soft  nontender, nondistended, no guarding, no rebound                 tenderness   Genitalia:    Deferred   Extremities:   Moves all extremities well, no edema, no cyanosis, no          redness   Pulses:   Pulses palpable and equal bilaterally   Skin:   No bleeding, bruising or rash   Lymph nodes:   No palpable adenopathy   Neurologic:   Cranial nerves 2 - 12 grossly intact, sensation intact, DTR     present and equal bilaterally      Results Review:     Lab Results   Component Value Date    WBC 7.34 09/30/2021    WBC 7.97 09/11/2020    WBC 12.87 (H) 01/04/2020    HGB 14.4 09/30/2021    HGB 14.0 09/11/2020    HGB 10.5 (L) 01/04/2020    HCT 44.8 09/30/2021    HCT 41.1 09/11/2020    HCT 32.4 (L) 01/04/2020     09/30/2021     09/11/2020     01/04/2020             No results found for: LIPASE  Lab Results   Component Value Date    INR 1.19 01/01/2020    INR 1.0 10/27/2015     No results found for: RESPCX    Radiology Review:  Imaging Results (Last 72 Hours)     ** No results  found for the last 72 hours. **           I reviewed the patient's new clinical results.  I reviewed the patient's new imaging results and agree with the interpretation.     ASSESSMENT/PLAN:   ASSESSMENT:  1.  Esophageal stricture    PLAN:  1.  Esophagogastroduodenoscopy with dilation of the esophagus    Risk and benefits associated with the procedure are reviewed with the patient.  The patient wished to proceed     Van Khoury DO  06/09/22  14:03 CDT

## 2022-09-08 ENCOUNTER — APPOINTMENT (OUTPATIENT)
Dept: ULTRASOUND IMAGING | Facility: HOSPITAL | Age: 67
End: 2022-09-08

## 2022-09-08 ENCOUNTER — HOSPITAL ENCOUNTER (OUTPATIENT)
Facility: HOSPITAL | Age: 67
Setting detail: OBSERVATION
Discharge: HOME | End: 2022-09-10
Attending: EMERGENCY MEDICINE | Admitting: INTERNAL MEDICINE

## 2022-09-08 DIAGNOSIS — I70.209 ARTERIAL OCCLUSION, LOWER EXTREMITY: Primary | ICD-10-CM

## 2022-09-08 DIAGNOSIS — Z74.09 IMPAIRED FUNCTIONAL MOBILITY, BALANCE, GAIT, AND ENDURANCE: ICD-10-CM

## 2022-09-08 LAB
ALBUMIN SERPL-MCNC: 4.2 G/DL (ref 3.5–5.2)
ALBUMIN/GLOB SERPL: 1.1 G/DL
ALP SERPL-CCNC: 91 U/L (ref 39–117)
ALT SERPL W P-5'-P-CCNC: 7 U/L (ref 1–41)
ANION GAP SERPL CALCULATED.3IONS-SCNC: 6 MMOL/L (ref 5–15)
APTT PPP: 28.8 SECONDS (ref 20–40.3)
APTT PPP: 30.3 SECONDS (ref 20–40.3)
AST SERPL-CCNC: 12 U/L (ref 1–40)
BASOPHILS # BLD AUTO: 0.1 10*3/MM3 (ref 0–0.2)
BASOPHILS NFR BLD AUTO: 1.1 % (ref 0–1.5)
BILIRUB SERPL-MCNC: 0.2 MG/DL (ref 0–1.2)
BILIRUB UR QL STRIP: NEGATIVE
BUN SERPL-MCNC: 20 MG/DL (ref 8–23)
BUN/CREAT SERPL: 17.4 (ref 7–25)
CALCIUM SPEC-SCNC: 9.1 MG/DL (ref 8.6–10.5)
CHLORIDE SERPL-SCNC: 103 MMOL/L (ref 98–107)
CLARITY UR: CLEAR
CO2 SERPL-SCNC: 31 MMOL/L (ref 22–29)
COLOR UR: YELLOW
CREAT SERPL-MCNC: 1.15 MG/DL (ref 0.76–1.27)
DEPRECATED RDW RBC AUTO: 41.9 FL (ref 37–54)
EGFRCR SERPLBLD CKD-EPI 2021: 70.2 ML/MIN/1.73
EOSINOPHIL # BLD AUTO: 0.28 10*3/MM3 (ref 0–0.4)
EOSINOPHIL NFR BLD AUTO: 3.1 % (ref 0.3–6.2)
ERYTHROCYTE [DISTWIDTH] IN BLOOD BY AUTOMATED COUNT: 12.6 % (ref 12.3–15.4)
GLOBULIN UR ELPH-MCNC: 3.7 GM/DL
GLUCOSE SERPL-MCNC: 92 MG/DL (ref 65–99)
GLUCOSE UR STRIP-MCNC: NEGATIVE MG/DL
HCT VFR BLD AUTO: 42.8 % (ref 37.5–51)
HGB BLD-MCNC: 13.9 G/DL (ref 13–17.7)
HGB UR QL STRIP.AUTO: NEGATIVE
HOLD SPECIMEN: NORMAL
IMM GRANULOCYTES # BLD AUTO: 0.03 10*3/MM3 (ref 0–0.05)
IMM GRANULOCYTES NFR BLD AUTO: 0.3 % (ref 0–0.5)
INR PPP: 1.02 (ref 0.8–1.2)
KETONES UR QL STRIP: NEGATIVE
LEUKOCYTE ESTERASE UR QL STRIP.AUTO: NEGATIVE
LYMPHOCYTES # BLD AUTO: 1.87 10*3/MM3 (ref 0.7–3.1)
LYMPHOCYTES NFR BLD AUTO: 20.9 % (ref 19.6–45.3)
MCH RBC QN AUTO: 29.5 PG (ref 26.6–33)
MCHC RBC AUTO-ENTMCNC: 32.5 G/DL (ref 31.5–35.7)
MCV RBC AUTO: 90.9 FL (ref 79–97)
MONOCYTES # BLD AUTO: 0.76 10*3/MM3 (ref 0.1–0.9)
MONOCYTES NFR BLD AUTO: 8.5 % (ref 5–12)
NEUTROPHILS NFR BLD AUTO: 5.9 10*3/MM3 (ref 1.7–7)
NEUTROPHILS NFR BLD AUTO: 66.1 % (ref 42.7–76)
NITRITE UR QL STRIP: NEGATIVE
NRBC BLD AUTO-RTO: 0 /100 WBC (ref 0–0.2)
PH UR STRIP.AUTO: 7.5 [PH] (ref 5–9)
PLATELET # BLD AUTO: 249 10*3/MM3 (ref 140–450)
PMV BLD AUTO: 10.2 FL (ref 6–12)
POTASSIUM SERPL-SCNC: 5 MMOL/L (ref 3.5–5.2)
PROT SERPL-MCNC: 7.9 G/DL (ref 6–8.5)
PROT UR QL STRIP: NEGATIVE
PROTHROMBIN TIME: 13.3 SECONDS (ref 11.1–15.3)
RBC # BLD AUTO: 4.71 10*6/MM3 (ref 4.14–5.8)
SODIUM SERPL-SCNC: 140 MMOL/L (ref 136–145)
SP GR UR STRIP: 1.02 (ref 1–1.03)
UROBILINOGEN UR QL STRIP: NORMAL
WBC NRBC COR # BLD: 8.94 10*3/MM3 (ref 3.4–10.8)

## 2022-09-08 PROCEDURE — 96365 THER/PROPH/DIAG IV INF INIT: CPT

## 2022-09-08 PROCEDURE — G0378 HOSPITAL OBSERVATION PER HR: HCPCS

## 2022-09-08 PROCEDURE — 25010000002 HEPARIN (PORCINE) PER 1000 UNITS

## 2022-09-08 PROCEDURE — 93970 EXTREMITY STUDY: CPT

## 2022-09-08 PROCEDURE — 85610 PROTHROMBIN TIME: CPT

## 2022-09-08 PROCEDURE — 96375 TX/PRO/DX INJ NEW DRUG ADDON: CPT

## 2022-09-08 PROCEDURE — 36415 COLL VENOUS BLD VENIPUNCTURE: CPT | Performed by: FAMILY MEDICINE

## 2022-09-08 PROCEDURE — 99284 EMERGENCY DEPT VISIT MOD MDM: CPT

## 2022-09-08 PROCEDURE — 85730 THROMBOPLASTIN TIME PARTIAL: CPT | Performed by: FAMILY MEDICINE

## 2022-09-08 PROCEDURE — 85730 THROMBOPLASTIN TIME PARTIAL: CPT

## 2022-09-08 PROCEDURE — 85025 COMPLETE CBC W/AUTO DIFF WBC: CPT

## 2022-09-08 PROCEDURE — 93925 LOWER EXTREMITY STUDY: CPT

## 2022-09-08 PROCEDURE — 80053 COMPREHEN METABOLIC PANEL: CPT

## 2022-09-08 PROCEDURE — 96376 TX/PRO/DX INJ SAME DRUG ADON: CPT

## 2022-09-08 PROCEDURE — 81003 URINALYSIS AUTO W/O SCOPE: CPT

## 2022-09-08 PROCEDURE — 96366 THER/PROPH/DIAG IV INF ADDON: CPT

## 2022-09-08 RX ORDER — CYCLOBENZAPRINE HCL 10 MG
10 TABLET ORAL 3 TIMES DAILY PRN
Status: DISCONTINUED | OUTPATIENT
Start: 2022-09-08 | End: 2022-09-10 | Stop reason: HOSPADM

## 2022-09-08 RX ORDER — LABETALOL HYDROCHLORIDE 5 MG/ML
10 INJECTION, SOLUTION INTRAVENOUS EVERY 6 HOURS PRN
Status: DISCONTINUED | OUTPATIENT
Start: 2022-09-08 | End: 2022-09-10 | Stop reason: HOSPADM

## 2022-09-08 RX ORDER — ATORVASTATIN CALCIUM 40 MG/1
40 TABLET, FILM COATED ORAL NIGHTLY
Status: DISCONTINUED | OUTPATIENT
Start: 2022-09-08 | End: 2022-09-10 | Stop reason: HOSPADM

## 2022-09-08 RX ORDER — SODIUM CHLORIDE 0.9 % (FLUSH) 0.9 %
10 SYRINGE (ML) INJECTION EVERY 12 HOURS SCHEDULED
Status: DISCONTINUED | OUTPATIENT
Start: 2022-09-08 | End: 2022-09-10 | Stop reason: HOSPADM

## 2022-09-08 RX ORDER — SODIUM CHLORIDE 0.9 % (FLUSH) 0.9 %
10 SYRINGE (ML) INJECTION AS NEEDED
Status: DISCONTINUED | OUTPATIENT
Start: 2022-09-08 | End: 2022-09-10 | Stop reason: HOSPADM

## 2022-09-08 RX ORDER — PANTOPRAZOLE SODIUM 40 MG/1
40 TABLET, DELAYED RELEASE ORAL
Status: DISCONTINUED | OUTPATIENT
Start: 2022-09-09 | End: 2022-09-10 | Stop reason: HOSPADM

## 2022-09-08 RX ORDER — HEPARIN SODIUM 10000 [USP'U]/100ML
13 INJECTION, SOLUTION INTRAVENOUS
Status: DISCONTINUED | OUTPATIENT
Start: 2022-09-08 | End: 2022-09-10 | Stop reason: HOSPADM

## 2022-09-08 RX ORDER — ATENOLOL 25 MG/1
25 TABLET ORAL DAILY
Status: DISCONTINUED | OUTPATIENT
Start: 2022-09-09 | End: 2022-09-10 | Stop reason: HOSPADM

## 2022-09-08 RX ORDER — HYDROXYZINE HYDROCHLORIDE 25 MG/1
25 TABLET, FILM COATED ORAL 3 TIMES DAILY PRN
Status: DISCONTINUED | OUTPATIENT
Start: 2022-09-08 | End: 2022-09-10 | Stop reason: HOSPADM

## 2022-09-08 RX ORDER — CLONIDINE HYDROCHLORIDE 0.2 MG/1
0.2 TABLET ORAL ONCE
Status: COMPLETED | OUTPATIENT
Start: 2022-09-08 | End: 2022-09-08

## 2022-09-08 RX ORDER — ACETAMINOPHEN 325 MG/1
650 TABLET ORAL EVERY 6 HOURS PRN
Status: DISCONTINUED | OUTPATIENT
Start: 2022-09-08 | End: 2022-09-10 | Stop reason: HOSPADM

## 2022-09-08 RX ORDER — BUDESONIDE AND FORMOTEROL FUMARATE DIHYDRATE 160; 4.5 UG/1; UG/1
2 AEROSOL RESPIRATORY (INHALATION)
Status: DISCONTINUED | OUTPATIENT
Start: 2022-09-08 | End: 2022-09-10 | Stop reason: HOSPADM

## 2022-09-08 RX ORDER — DOCUSATE SODIUM 100 MG/1
100 CAPSULE, LIQUID FILLED ORAL DAILY
Status: DISCONTINUED | OUTPATIENT
Start: 2022-09-08 | End: 2022-09-10 | Stop reason: HOSPADM

## 2022-09-08 RX ORDER — HEPARIN SODIUM 5000 [USP'U]/ML
80 INJECTION, SOLUTION INTRAVENOUS; SUBCUTANEOUS ONCE
Status: COMPLETED | OUTPATIENT
Start: 2022-09-08 | End: 2022-09-08

## 2022-09-08 RX ORDER — HYDRALAZINE HYDROCHLORIDE 20 MG/ML
10 INJECTION INTRAMUSCULAR; INTRAVENOUS EVERY 6 HOURS PRN
Status: DISCONTINUED | OUTPATIENT
Start: 2022-09-08 | End: 2022-09-10 | Stop reason: HOSPADM

## 2022-09-08 RX ORDER — GABAPENTIN 100 MG/1
200 CAPSULE ORAL 3 TIMES DAILY
Status: DISCONTINUED | OUTPATIENT
Start: 2022-09-08 | End: 2022-09-10 | Stop reason: HOSPADM

## 2022-09-08 RX ORDER — HEPARIN SODIUM 5000 [USP'U]/ML
40 INJECTION, SOLUTION INTRAVENOUS; SUBCUTANEOUS AS NEEDED
Status: DISCONTINUED | OUTPATIENT
Start: 2022-09-08 | End: 2022-09-10 | Stop reason: HOSPADM

## 2022-09-08 RX ORDER — HEPARIN SODIUM 5000 [USP'U]/ML
80 INJECTION, SOLUTION INTRAVENOUS; SUBCUTANEOUS AS NEEDED
Status: DISCONTINUED | OUTPATIENT
Start: 2022-09-08 | End: 2022-09-10 | Stop reason: HOSPADM

## 2022-09-08 RX ADMIN — CLONIDINE HYDROCHLORIDE 0.2 MG: 0.2 TABLET ORAL at 20:37

## 2022-09-08 RX ADMIN — ATORVASTATIN CALCIUM 40 MG: 40 TABLET, FILM COATED ORAL at 23:16

## 2022-09-08 RX ADMIN — LABETALOL HYDROCHLORIDE 10 MG: 5 INJECTION, SOLUTION INTRAVENOUS at 21:32

## 2022-09-08 RX ADMIN — GABAPENTIN 200 MG: 100 CAPSULE ORAL at 23:16

## 2022-09-08 RX ADMIN — HEPARIN SODIUM 4800 UNITS: 5000 INJECTION INTRAVENOUS; SUBCUTANEOUS at 20:47

## 2022-09-08 RX ADMIN — HEPARIN SODIUM 12 UNITS/KG/HR: 10000 INJECTION, SOLUTION INTRAVENOUS at 21:31

## 2022-09-08 NOTE — ED NOTES
"Patient present to ED from home with c/o  Intermittent RLE pain for 3 weeks. Patient states sometimes \"it locks up\" and \"toes turn red\". Patient states he has a hx of MI and stroke.   "

## 2022-09-08 NOTE — ED PROVIDER NOTES
"Subjective   PIT    66 year old male patient presents to ER for complaint of right great toe redness with decreased sensation in right lower leg and tingling for past 2-3 weeks. He has a history of MI and multiple CVAs and has had stents placed in his right lower extremity almost 20 years ago which he reports were dissolvable and have \"disappeared.\" he had a recent GI bleed and was taken off of his blood thinners. He reports that he was scoped and was found to have hemorrhoids. Is a former smoker, denies ETOH or illicit drug use. [facility] is [facility].           Review of Systems   Constitutional: Negative.  Negative for fatigue.   HENT: Negative.    Eyes: Negative.    Respiratory: Negative.  Negative for shortness of breath.    Cardiovascular: Negative.  Negative for chest pain.   Gastrointestinal: Negative.    Endocrine: Negative.    Genitourinary: Negative.    Musculoskeletal: Negative.    Skin: Positive for color change.        Redness to right great toe with pallor to remaining toes for past 2-3 weeks intermittently.   Allergic/Immunologic: Negative.    Neurological: Negative.  Negative for weakness.   Hematological: Negative.    Psychiatric/Behavioral: Negative.        Past Medical History:   Diagnosis Date   • COPD (chronic obstructive pulmonary disease) ([facility])    • Elevated cholesterol    • Hypertension    • Myocardial infarct (HCC) 2003   • Stroke ([facility]) 2015    3-4 strokes in past; last one 2015--has affected memory/balance   • Weight decreasing     over last 3 weeks       Allergies   Allergen Reactions   • Contrast Dye Itching       Past Surgical History:   Procedure Laterality Date   • ENDOSCOPY N/A 8/29/2017    Procedure: ESOPHAGOGASTRODUODENOSCOPY;  Surgeon: Van Khoury DO;  Location: [facility] ENDOSCOPY;  Service:    • ENDOSCOPY N/A 6/11/2019    Procedure: ESOPHAGOGASTRODUODENOSCOPY;  Surgeon: Van Khoury DO;  Location: [facility] ENDOSCOPY;  Service: Gastroenterology   • ENDOSCOPY N/A 10/22/2020    " "Procedure: ESOPHAGOGASTRODUODENOSCOPY;  Surgeon: Van Khoury DO;  Location: [facility] ENDOSCOPY;  Service: Gastroenterology;  Laterality: N/A;  savory dilation 42-48   • ENDOSCOPY N/A 6/9/2022    Procedure: ESOPHAGOGASTRODUODENOSCOPY;  Surgeon: Van Khoury DO;  Location: [facility] ENDOSCOPY;  Service: Gastroenterology;  Laterality: N/A;       History reviewed. No pertinent family history.    Social History     Socioeconomic History   • Marital status:    Tobacco Use   • Smoking status: Former Smoker   • Smokeless tobacco: Never Used   Vaping Use   • Vaping Use: Never used   Substance and Sexual Activity   • Alcohol use: Not Currently   • Drug use: Never           Objective    BP (!) 189/91   Pulse 79   Temp 97.7 °F (36.5 °C)   Resp 20   Ht 185.4 cm (73\")   Wt 59.9 kg (132 lb)   SpO2 100%   BMI 17.42 kg/m²     Physical Exam  Vitals and nursing note reviewed.   Constitutional:       General: He is not in acute distress.     Appearance: Normal appearance. He is not ill-appearing, toxic-appearing or diaphoretic.   HENT:      Head: Normocephalic.      Nose: Nose normal.      Mouth/Throat:      Mouth: Mucous membranes are moist.   Eyes:      Pupils: Pupils are equal, round, and reactive to light.   Cardiovascular:      Rate and Rhythm: Normal rate and regular rhythm.      Pulses: Normal pulses.      Heart sounds: Normal heart sounds.   Pulmonary:      Effort: Pulmonary effort is normal.      Breath sounds: Normal breath sounds.   Abdominal:      General: Bowel sounds are normal.      Palpations: Abdomen is soft.   Musculoskeletal:         General: Normal range of motion.      Cervical back: Normal range of motion.   Skin:     General: Skin is warm and dry.      Capillary Refill: Capillary refill takes more than 3 seconds.      Coloration: Skin is pale.      Findings: Erythema present.      Comments: Pedal pulses weak in bilateral feet. Right great toe red, pallor to remaining toes, cap refill delayed " but present. Sensory decreased, motor intact. Old surgical scars noted to medial aspect of left thigh.    Neurological:      Mental Status: He is alert and oriented to person, place, and time.   Psychiatric:         Mood and Affect: Mood normal.         Behavior: Behavior normal.         Thought Content: Thought content normal.         Judgment: Judgment normal.         Procedures           ED Course  ED Course as of 09/08/22 2030   Thu Sep 08, 2022   1930 Spoke with Dr. Vasquez who is agreeable to consult for admission and requests a heparin infusion [HS]   1948 Spoke with Dr. Carrillo who is agreeable with admission [HS]      ED Course User Index  [HS] Joanna Lopez, RENALDO           Results for orders placed or performed during the hospital encounter of 09/08/22   Comprehensive Metabolic Panel    Specimen: Blood   Result Value Ref Range    Glucose 92 65 - 99 mg/dL    BUN 20 8 - 23 mg/dL    Creatinine 1.15 0.76 - 1.27 mg/dL    Sodium 140 136 - 145 mmol/L    Potassium 5.0 3.5 - 5.2 mmol/L    Chloride 103 98 - 107 mmol/L    CO2 31.0 (H) 22.0 - 29.0 mmol/L    Calcium 9.1 8.6 - 10.5 mg/dL    Total Protein 7.9 6.0 - 8.5 g/dL    Albumin 4.20 3.50 - 5.20 g/dL    ALT (SGPT) 7 1 - 41 U/L    AST (SGOT) 12 1 - 40 U/L    Alkaline Phosphatase 91 39 - 117 U/L    Total Bilirubin 0.2 0.0 - 1.2 mg/dL    Globulin 3.7 gm/dL    A/G Ratio 1.1 g/dL    BUN/Creatinine Ratio 17.4 7.0 - 25.0    Anion Gap 6.0 5.0 - 15.0 mmol/L    eGFR 70.2 >60.0 mL/min/1.73   Urinalysis With Microscopic If Indicated (No Culture) - Urine, Clean Catch    Specimen: Urine, Clean Catch   Result Value Ref Range    Color, UA Yellow Yellow, Straw, Dark Yellow, Heather    Appearance, UA Clear Clear    pH, UA 7.5 5.0 - 9.0    Specific Gravity, UA 1.020 1.003 - 1.030    Glucose, UA Negative Negative    Ketones, UA Negative Negative    Bilirubin, UA Negative Negative    Blood, UA Negative Negative    Protein, UA Negative Negative    Leuk Esterase, UA Negative  Negative    Nitrite, UA Negative Negative    Urobilinogen, UA 1.0 E.U./dL 0.2 - 1.0 E.U./dL   CBC Auto Differential    Specimen: Blood   Result Value Ref Range    WBC 8.94 3.40 - 10.80 10*3/mm3    RBC 4.71 4.14 - 5.80 10*6/mm3    Hemoglobin 13.9 13.0 - 17.7 g/dL    Hematocrit 42.8 37.5 - 51.0 %    MCV 90.9 79.0 - 97.0 fL    MCH 29.5 26.6 - 33.0 pg    MCHC 32.5 31.5 - 35.7 g/dL    RDW 12.6 12.3 - 15.4 %    RDW-SD 41.9 37.0 - 54.0 fl    MPV 10.2 6.0 - 12.0 fL    Platelets 249 140 - 450 10*3/mm3    Neutrophil % 66.1 42.7 - 76.0 %    Lymphocyte % 20.9 19.6 - 45.3 %    Monocyte % 8.5 5.0 - 12.0 %    Eosinophil % 3.1 0.3 - 6.2 %    Basophil % 1.1 0.0 - 1.5 %    Immature Grans % 0.3 0.0 - 0.5 %    Neutrophils, Absolute 5.90 1.70 - 7.00 10*3/mm3    Lymphocytes, Absolute 1.87 0.70 - 3.10 10*3/mm3    Monocytes, Absolute 0.76 0.10 - 0.90 10*3/mm3    Eosinophils, Absolute 0.28 0.00 - 0.40 10*3/mm3    Basophils, Absolute 0.10 0.00 - 0.20 10*3/mm3    Immature Grans, Absolute 0.03 0.00 - 0.05 10*3/mm3    nRBC 0.0 0.0 - 0.2 /100 WBC   Protime-INR    Specimen: Blood   Result Value Ref Range    Protime 13.3 11.1 - 15.3 Seconds    INR 1.02 0.80 - 1.20   aPTT    Specimen: Blood   Result Value Ref Range    PTT 28.8 20.0 - 40.3 seconds   Gold Top - SST   Result Value Ref Range    Extra Tube Hold for add-ons.           US Arterial Doppler Lower Extremity Complete    Result Date: 9/8/2022  EXAM:   US Duplex Bilateral Lower Extremities Arteries CLINICAL HISTORY:   The patient is 66 years old and is Male; Poor bloodflow TECHNIQUE:   Real-time duplex ultrasound scan of the bilateral lower extremity arteries integrating B-mode two-dimensional vascular structure, Doppler spectral analysis and color flow Doppler imaging. COMPARISON:   CTA angiogram bilateral lower extremity 10/25/2021 FINDINGS:   RIGHT COMMON FEMORAL ARTERY:  Peak systolic velocity 19 cm/s. Monophasic waveform.   RIGHT SUPERFICIAL FEMORAL ARTERY:  Proximal peak systolic velocity  26 cm/s. Monophasic waveform. Mid peak systolic velocity 19 cm/s. Monophasic waveform. Distal peak systolic velocity 26 cm/s. Monophasic waveform.   RIGHT POPLITEAL ARTERY:  Peak systolic velocity 17 cm/s. Monophasic waveform.   RIGHT CALF/FOOT ARTERIES:  Right peroneal artery: Peak systolic velocity 14 cm/s. Monophasic waveform.  Right anterior tibial artery: Peak systolic velocity 11 cm/s. Monophasic waveform. Right posterior tibial artery: Occluded.   LEFT COMMON FEMORAL ARTERY:  Peak systolic velocity 97 cm/s. Biphasic waveform.   LEFT DEEP FEMORAL ARTERY:  Peak systolic velocity 109 cm/s. Biphasic waveform.   LEFT SUPERFICIAL FEMORAL ARTERY:  Proximal peak systolic velocity 83 cm/s. Biphasic waveform. Mid peak systolic velocity 138 cm/s. Biphasic waveform. Distal peak systolic velocity 124 cm/s. Biphasic waveform.   LEFT POPLITEAL ARTERY:  Peak systolic velocity 72 cm/s. Biphasic waveform.   LEFT CALF/FOOT ARTERIES:  Left peroneal artery: Peak systolic velocity 51 cm/s.  Left anterior tibial artery: Peak systolic velocity 39 cm/s. Biphasic waveform.  Left posterior tibial artery: Peak systolic velocity 50 cm/s. Biphasic waveform.   SOFT TISSUES:  Unremarkable.     1.  Severe right lower extremity inflow disease. Severe right lower extremity peripheral arterial disease. 2.  Right posterior tibial artery occlusion. 3.  No significant stenosis of the left lower extremity arteries. Electronically signed by:  Andrea Gonzalez MD  9/8/2022 6:58 PM CDT Workstation: [id number]    US Venous Doppler Lower Extremity Bilateral (duplex)    Result Date: 9/8/2022  EXAM: US LOWER EXTREMITY VEINS BILATERAL HISTORY: decreased circulation right lower, decreased pulses bilateral COMPARISON STUDY: TECHNIQUE: Grayscale, duplex and color Doppler sonogram of the bilateral lower extremities was obtained. FINDINGS:  There is complete coaptation with graded compression at all visualized levels from the common femoral vein to the calf  veins. There is augmentation of the venous waveform with calf compression and respiratory variation seen at appropriate levels.  No filling defect is seen.   No Baker's cyst seen. Unremarkable left greater saphenous vein. Patient does not have greater saphenous vein on the right.     IMPRESSION 1.  Patient does not have greater saphenous vein on the right. 2.  No evidence of deep venous thrombosis of the bilateral lower extremities. Electronically signed by:  Karl Olivares MD  9/8/2022 6:05 PM CDT Workstation: [id number]                               [facility]    Final diagnoses:   Arterial occlusion, lower extremity (HCC)       ED Disposition  ED Disposition     ED Disposition   Decision to Admit    Condition   --    Comment   Level of Care: Telemetry [5]   Diagnosis: Arterial occlusion, lower extremity (HCC) [2060642]   Admitting Physician: VICK SIMEON [[id number]]   Attending Physician: VICK SIMEON [[id number]]               No follow-up provider specified.       Medication List      No changes were made to your prescriptions during this visit.          Joanna Lopez APRN  09/08/22 1952       Joanna Lopez APRN  09/08/22 2000       Joanna Lopez APRN  09/08/22 2030

## 2022-09-09 ENCOUNTER — APPOINTMENT (OUTPATIENT)
Dept: CT IMAGING | Facility: HOSPITAL | Age: 67
End: 2022-09-09

## 2022-09-09 PROBLEM — I73.9 PVD (PERIPHERAL VASCULAR DISEASE) WITH CLAUDICATION: Status: ACTIVE | Noted: 2022-09-09

## 2022-09-09 LAB
25(OH)D3 SERPL-MCNC: 19.4 NG/ML (ref 30–100)
ALBUMIN SERPL-MCNC: 3.7 G/DL (ref 3.5–5.2)
ALBUMIN/GLOB SERPL: 1 G/DL
ALP SERPL-CCNC: 84 U/L (ref 39–117)
ALT SERPL W P-5'-P-CCNC: 6 U/L (ref 1–41)
ANION GAP SERPL CALCULATED.3IONS-SCNC: 8 MMOL/L (ref 5–15)
APTT PPP: 162.9 SECONDS (ref 20–40.3)
APTT PPP: 37.8 SECONDS (ref 20–40.3)
APTT PPP: 78.3 SECONDS (ref 20–40.3)
AST SERPL-CCNC: 14 U/L (ref 1–40)
BASOPHILS # BLD AUTO: 0.1 10*3/MM3 (ref 0–0.2)
BASOPHILS NFR BLD AUTO: 1.1 % (ref 0–1.5)
BILIRUB SERPL-MCNC: 0.4 MG/DL (ref 0–1.2)
BUN SERPL-MCNC: 18 MG/DL (ref 8–23)
BUN/CREAT SERPL: 17.5 (ref 7–25)
CALCIUM SPEC-SCNC: 9.1 MG/DL (ref 8.6–10.5)
CHLORIDE SERPL-SCNC: 105 MMOL/L (ref 98–107)
CO2 SERPL-SCNC: 25 MMOL/L (ref 22–29)
CREAT SERPL-MCNC: 1.03 MG/DL (ref 0.76–1.27)
DEPRECATED RDW RBC AUTO: 41 FL (ref 37–54)
EGFRCR SERPLBLD CKD-EPI 2021: 80.1 ML/MIN/1.73
EOSINOPHIL # BLD AUTO: 0.28 10*3/MM3 (ref 0–0.4)
EOSINOPHIL NFR BLD AUTO: 3.2 % (ref 0.3–6.2)
ERYTHROCYTE [DISTWIDTH] IN BLOOD BY AUTOMATED COUNT: 12.5 % (ref 12.3–15.4)
FOLATE SERPL-MCNC: 5.55 NG/ML (ref 4.78–24.2)
GLOBULIN UR ELPH-MCNC: 3.6 GM/DL
GLUCOSE SERPL-MCNC: 98 MG/DL (ref 65–99)
HBA1C MFR BLD: 5.4 % (ref 4.8–5.6)
HCT VFR BLD AUTO: 40.9 % (ref 37.5–51)
HGB BLD-MCNC: 13.5 G/DL (ref 13–17.7)
IMM GRANULOCYTES # BLD AUTO: 0.09 10*3/MM3 (ref 0–0.05)
IMM GRANULOCYTES NFR BLD AUTO: 1 % (ref 0–0.5)
LYMPHOCYTES # BLD AUTO: 2.5 10*3/MM3 (ref 0.7–3.1)
LYMPHOCYTES NFR BLD AUTO: 28.5 % (ref 19.6–45.3)
MAGNESIUM SERPL-MCNC: 2.4 MG/DL (ref 1.6–2.4)
MCH RBC QN AUTO: 30 PG (ref 26.6–33)
MCHC RBC AUTO-ENTMCNC: 33 G/DL (ref 31.5–35.7)
MCV RBC AUTO: 90.9 FL (ref 79–97)
MONOCYTES # BLD AUTO: 0.69 10*3/MM3 (ref 0.1–0.9)
MONOCYTES NFR BLD AUTO: 7.9 % (ref 5–12)
NEUTROPHILS NFR BLD AUTO: 5.1 10*3/MM3 (ref 1.7–7)
NEUTROPHILS NFR BLD AUTO: 58.3 % (ref 42.7–76)
NRBC BLD AUTO-RTO: 0 /100 WBC (ref 0–0.2)
PLATELET # BLD AUTO: 212 10*3/MM3 (ref 140–450)
PMV BLD AUTO: 10.7 FL (ref 6–12)
POTASSIUM SERPL-SCNC: 4.1 MMOL/L (ref 3.5–5.2)
PROT SERPL-MCNC: 7.3 G/DL (ref 6–8.5)
RBC # BLD AUTO: 4.5 10*6/MM3 (ref 4.14–5.8)
SODIUM SERPL-SCNC: 138 MMOL/L (ref 136–145)
T4 FREE SERPL-MCNC: 1.48 NG/DL (ref 0.93–1.7)
TSH SERPL DL<=0.05 MIU/L-ACNC: 1.87 UIU/ML (ref 0.27–4.2)
VIT B12 BLD-MCNC: 504 PG/ML (ref 211–946)
WBC NRBC COR # BLD: 8.76 10*3/MM3 (ref 3.4–10.8)

## 2022-09-09 PROCEDURE — 82607 VITAMIN B-12: CPT | Performed by: INTERNAL MEDICINE

## 2022-09-09 PROCEDURE — 0 IOPAMIDOL PER 1 ML: Performed by: INTERNAL MEDICINE

## 2022-09-09 PROCEDURE — 82306 VITAMIN D 25 HYDROXY: CPT | Performed by: INTERNAL MEDICINE

## 2022-09-09 PROCEDURE — G0378 HOSPITAL OBSERVATION PER HR: HCPCS

## 2022-09-09 PROCEDURE — 96366 THER/PROPH/DIAG IV INF ADDON: CPT

## 2022-09-09 PROCEDURE — 25010000002 METHYLPREDNISOLONE PER 40 MG: Performed by: NURSE PRACTITIONER

## 2022-09-09 PROCEDURE — 83735 ASSAY OF MAGNESIUM: CPT | Performed by: INTERNAL MEDICINE

## 2022-09-09 PROCEDURE — 25010000002 HEPARIN (PORCINE) PER 1000 UNITS: Performed by: INTERNAL MEDICINE

## 2022-09-09 PROCEDURE — 84443 ASSAY THYROID STIM HORMONE: CPT | Performed by: INTERNAL MEDICINE

## 2022-09-09 PROCEDURE — 84439 ASSAY OF FREE THYROXINE: CPT | Performed by: INTERNAL MEDICINE

## 2022-09-09 PROCEDURE — 99214 OFFICE O/P EST MOD 30 MIN: CPT | Performed by: NURSE PRACTITIONER

## 2022-09-09 PROCEDURE — 25010000002 DIPHENHYDRAMINE PER 50 MG: Performed by: NURSE PRACTITIONER

## 2022-09-09 PROCEDURE — 80053 COMPREHEN METABOLIC PANEL: CPT | Performed by: INTERNAL MEDICINE

## 2022-09-09 PROCEDURE — 96375 TX/PRO/DX INJ NEW DRUG ADDON: CPT

## 2022-09-09 PROCEDURE — 85730 THROMBOPLASTIN TIME PARTIAL: CPT | Performed by: INTERNAL MEDICINE

## 2022-09-09 PROCEDURE — 82746 ASSAY OF FOLIC ACID SERUM: CPT | Performed by: INTERNAL MEDICINE

## 2022-09-09 PROCEDURE — 83036 HEMOGLOBIN GLYCOSYLATED A1C: CPT | Performed by: INTERNAL MEDICINE

## 2022-09-09 PROCEDURE — 75635 CT ANGIO ABDOMINAL ARTERIES: CPT

## 2022-09-09 PROCEDURE — 94799 UNLISTED PULMONARY SVC/PX: CPT

## 2022-09-09 PROCEDURE — 96376 TX/PRO/DX INJ SAME DRUG ADON: CPT

## 2022-09-09 PROCEDURE — 94760 N-INVAS EAR/PLS OXIMETRY 1: CPT

## 2022-09-09 PROCEDURE — 85025 COMPLETE CBC W/AUTO DIFF WBC: CPT

## 2022-09-09 PROCEDURE — 94640 AIRWAY INHALATION TREATMENT: CPT

## 2022-09-09 RX ORDER — METHYLPREDNISOLONE SODIUM SUCCINATE 40 MG/ML
40 INJECTION, POWDER, LYOPHILIZED, FOR SOLUTION INTRAMUSCULAR; INTRAVENOUS EVERY 4 HOURS
Status: COMPLETED | OUTPATIENT
Start: 2022-09-09 | End: 2022-09-09

## 2022-09-09 RX ORDER — DIPHENHYDRAMINE HYDROCHLORIDE 50 MG/ML
50 INJECTION INTRAMUSCULAR; INTRAVENOUS ONCE
Status: COMPLETED | OUTPATIENT
Start: 2022-09-09 | End: 2022-09-09

## 2022-09-09 RX ORDER — SODIUM CHLORIDE 9 MG/ML
100 INJECTION, SOLUTION INTRAVENOUS
Status: COMPLETED | OUTPATIENT
Start: 2022-09-09 | End: 2022-09-10

## 2022-09-09 RX ADMIN — HYDROXYZINE HYDROCHLORIDE 25 MG: 25 TABLET, FILM COATED ORAL at 20:49

## 2022-09-09 RX ADMIN — METHYLPREDNISOLONE SODIUM SUCCINATE 40 MG: 40 INJECTION, POWDER, FOR SOLUTION INTRAMUSCULAR; INTRAVENOUS at 14:37

## 2022-09-09 RX ADMIN — BUDESONIDE AND FORMOTEROL FUMARATE DIHYDRATE 2 PUFF: 160; 4.5 AEROSOL RESPIRATORY (INHALATION) at 07:40

## 2022-09-09 RX ADMIN — CYCLOBENZAPRINE HYDROCHLORIDE 10 MG: 10 TABLET, FILM COATED ORAL at 03:36

## 2022-09-09 RX ADMIN — IOPAMIDOL 120 ML: 755 INJECTION, SOLUTION INTRAVENOUS at 23:00

## 2022-09-09 RX ADMIN — GABAPENTIN 200 MG: 100 CAPSULE ORAL at 08:32

## 2022-09-09 RX ADMIN — ATENOLOL 25 MG: 25 TABLET ORAL at 08:32

## 2022-09-09 RX ADMIN — HEPARIN SODIUM 4800 UNITS: 5000 INJECTION INTRAVENOUS; SUBCUTANEOUS at 12:49

## 2022-09-09 RX ADMIN — SODIUM CHLORIDE 100 ML: 9 INJECTION, SOLUTION INTRAVENOUS at 23:01

## 2022-09-09 RX ADMIN — DOCUSATE SODIUM 100 MG: 100 CAPSULE, LIQUID FILLED ORAL at 08:32

## 2022-09-09 RX ADMIN — DIPHENHYDRAMINE HYDROCHLORIDE 50 MG: 50 INJECTION INTRAMUSCULAR; INTRAVENOUS at 21:56

## 2022-09-09 RX ADMIN — GABAPENTIN 200 MG: 100 CAPSULE ORAL at 20:45

## 2022-09-09 RX ADMIN — METHYLPREDNISOLONE SODIUM SUCCINATE 40 MG: 40 INJECTION, POWDER, FOR SOLUTION INTRAMUSCULAR; INTRAVENOUS at 11:32

## 2022-09-09 RX ADMIN — GABAPENTIN 200 MG: 100 CAPSULE ORAL at 17:16

## 2022-09-09 RX ADMIN — ATORVASTATIN CALCIUM 40 MG: 40 TABLET, FILM COATED ORAL at 20:45

## 2022-09-09 RX ADMIN — METHYLPREDNISOLONE SODIUM SUCCINATE 40 MG: 40 INJECTION, POWDER, FOR SOLUTION INTRAMUSCULAR; INTRAVENOUS at 18:20

## 2022-09-09 NOTE — PLAN OF CARE
Problem: Pain Acute  Goal: Acceptable Pain Control and Functional Ability  Outcome: Ongoing, Progressing     Problem: Tissue Perfusion Altered  Goal: Improved Tissue Perfusion  Outcome: Ongoing, Progressing     Problem: Adult Inpatient Plan of Care  Goal: Plan of Care Review  Outcome: Ongoing, Progressing  Goal: Patient-Specific Goal (Individualized)  Outcome: Ongoing, Progressing  Goal: Absence of Hospital-Acquired Illness or Injury  Outcome: Ongoing, Progressing  Intervention: Identify and Manage Fall Risk  Recent Flowsheet Documentation  Taken 9/9/2022 0000 by Alena Mendez, RN  Safety Promotion/Fall Prevention: safety round/check completed  Taken 9/8/2022 2240 by Alena Mendez RN  Safety Promotion/Fall Prevention: safety round/check completed  Intervention: Prevent and Manage VTE (Venous Thromboembolism) Risk  Recent Flowsheet Documentation  Taken 9/8/2022 2240 by Alena Mendez, RN  Activity Management: activity adjusted per tolerance  Goal: Optimal Comfort and Wellbeing  Outcome: Ongoing, Progressing  Goal: Readiness for Transition of Care  Outcome: Ongoing, Progressing  Intervention: Mutually Develop Transition Plan  Recent Flowsheet Documentation  Taken 9/8/2022 2302 by Alena Mendez, RN  Transportation Anticipated: family or friend will provide  Patient/Family Anticipated Services at Transition: none  Patient/Family Anticipates Transition to: home with family  Taken 9/8/2022 2300 by Alena Mendez, RN  Equipment Currently Used at Home: bath bench     Problem: Fall Injury Risk  Goal: Absence of Fall and Fall-Related Injury  Outcome: Ongoing, Progressing  Intervention: Promote Injury-Free Environment  Recent Flowsheet Documentation  Taken 9/9/2022 0000 by Alena Mendez, RN  Safety Promotion/Fall Prevention: safety round/check completed  Taken 9/8/2022 2240 by Alena Mendez RN  Safety Promotion/Fall Prevention: safety round/check completed   Goal Outcome Evaluation:

## 2022-09-09 NOTE — PLAN OF CARE
Problem: Adult Inpatient Plan of Care  Goal: Plan of Care Review  Outcome: Ongoing, Progressing  Flowsheets (Taken 9/9/2022 1104)  Plan of Care Reviewed With: patient   Goal Outcome Evaluation:  Plan of Care Reviewed With: patient            Good appetite.  Agreed to boost on trays.  Met criteria for moderate chronic malnutrition.  Menu provided.

## 2022-09-09 NOTE — SIGNIFICANT NOTE
09/09/22 1317   OTHER   Discipline physical therapist;occupational therapist   Rehab Time/Intention   Session Not Performed other (see comments)   Recommendation   PT - Next Appointment 09/10/22   Recommendation   OT - Next Appointment 09/10/22   PT/OT ricco attempted. Spoke with nursing and going to wait for imaging of RLE and cardiovascular consult to mobilize patient. Will f/u as appropriate.

## 2022-09-09 NOTE — CONSULTS
CVTS CONSULTATION          Patient Care Team:  Lorenzo Mondragon MD as PCP - General  Requesting Provider: Joanna Lopez APRN     Chief complaint: RIGHT leg pain      SUBJECTIVE       History of Present Illness:  66 y.o. male with HTN(stable, increased risk stroke, rupture), Hyperlipidemia(stable, increased risk cardiovascular events) and COPD(stable, increased risk pulmonary complications)  CAD. CVA. PVD (unstable)  former smoker. Failed RIGHT Fem-Tib (2003). [facility]/Keven. Most recent evaluation Dr. Aviles ([facility]. RLE pain x 1mo, worsening claudication. No TIA, stroke, or amaurosis.  No MI or claudication. No other associated signs, symptoms or modifying factors.    9/2022: Arterial duplex: Severe right lower extremity inflow disease. Severe right lower extremity peripheral arterial disease. Right posterior tibial artery occlusion.     The following portions of the patient's history were reviewed and updated as appropriate: allergies, current medications, past family history, past medical history, past social history, past surgical history and problem list.  Recent images independently reviewed.  Available laboratory values reviewed.    Review of Systems   Constitutional: Positive for activity change.   Eyes: Negative for visual disturbance.   Respiratory: Negative for chest tightness and shortness of breath.    Cardiovascular: Negative for leg swelling.   Gastrointestinal: Negative for abdominal pain.   Genitourinary: Negative for difficulty urinating.   Musculoskeletal: Positive for arthralgias.   Skin: Positive for pallor.   Neurological: Positive for numbness. Negative for dizziness and weakness.   Hematological: Does not bruise/bleed easily.   Psychiatric/Behavioral: The patient is not nervous/anxious.    All other systems reviewed and are negative.       Past Medical History:   Diagnosis Date   • COPD (chronic obstructive pulmonary disease) (HCC)    • Elevated cholesterol    •  Hypertension    • Myocardial infarct (HCC) 2003   • Stroke (HCC) 2015    3-4 strokes in past; last one 2015--has affected memory/balance   • Weight decreasing     over last 3 weeks     Past Surgical History:   Procedure Laterality Date   • ENDOSCOPY N/A 8/29/2017    Procedure: ESOPHAGOGASTRODUODENOSCOPY;  Surgeon: Van Khoury DO;  Location: [facility] ENDOSCOPY;  Service:    • ENDOSCOPY N/A 6/11/2019    Procedure: ESOPHAGOGASTRODUODENOSCOPY;  Surgeon: Van Khoury DO;  Location: [facility] ENDOSCOPY;  Service: Gastroenterology   • ENDOSCOPY N/A 10/22/2020    Procedure: ESOPHAGOGASTRODUODENOSCOPY;  Surgeon: Van Khoury DO;  Location: [facility] ENDOSCOPY;  Service: Gastroenterology;  Laterality: N/A;  savory dilation 42-48   • ENDOSCOPY N/A 6/9/2022    Procedure: ESOPHAGOGASTRODUODENOSCOPY;  Surgeon: Van Khoury DO;  Location: [facility] ENDOSCOPY;  Service: Gastroenterology;  Laterality: N/A;     History reviewed. No pertinent family history.  Social History     Tobacco Use   • Smoking status: Former Smoker   • Smokeless tobacco: Never Used   Vaping Use   • Vaping Use: Never used   Substance Use Topics   • Alcohol use: Not Currently   • Drug use: Never     Medications Prior to Admission   Medication Sig Dispense Refill Last Dose   • albuterol (PROVENTIL HFA;VENTOLIN HFA) 108 (90 Base) MCG/ACT inhaler Inhale 2 puffs Every 4 (Four) Hours As Needed for Wheezing.   9/8/2022 at Unknown time   • atenolol (TENORMIN) 25 MG tablet Take 1 tablet by mouth Daily.   9/8/2022 at Unknown time   • atorvastatin (LIPITOR) 40 MG tablet Take 1 tablet by mouth Every Night.   9/7/2022 at Unknown time   • budesonide-formoterol (SYMBICORT) 160-4.5 MCG/ACT inhaler Inhale 2 puffs 2 (Two) Times a Day.   9/8/2022 at Unknown time   • cyclobenzaprine (FLEXERIL) 10 MG tablet Take 10 mg by mouth 3 (Three) Times a Day As Needed for Muscle Spasms.   9/7/2022 at Unknown time   • docusate sodium 100 MG capsule Take 100 mg by mouth Daily.    "9/7/2022 at Unknown time   • gabapentin (NEURONTIN) 100 MG capsule Take 2 capsules by mouth 3 (Three) Times a Day. 9 capsule 0 9/7/2022 at Unknown time   • hydrOXYzine (ATARAX) 25 MG tablet Take 25 mg by mouth 3 (Three) Times a Day As Needed for Itching.   9/7/2022 at Unknown time     atenolol, 25 mg, Oral, Daily  atorvastatin, 40 mg, Oral, Nightly  budesonide-formoterol, 2 puff, Inhalation, BID - RT  diphenhydrAMINE, 50 mg, Intravenous, Once  docusate sodium, 100 mg, Oral, Daily  gabapentin, 200 mg, Oral, TID  methylPREDNISolone sodium succinate, 40 mg, Intravenous, Q4H  pantoprazole, 40 mg, Oral, Q AM  sodium chloride, 10 mL, Intravenous, Q12H      Allergies:  Contrast dye    OBJECTIVE        Vital Signs  Temp:  [97.3 °F (36.3 °C)-97.7 °F (36.5 °C)] 97.4 °F (36.3 °C)  Heart Rate:  [] 55  Resp:  [16-20] 16  BP: (102-193)/(56-97) 102/59    Flowsheet Rows    Flowsheet Row First Filed Value   Admission Height 185.4 cm (73\") Documented at 09/08/2022 1516   Admission Weight 59.9 kg (132 lb) Documented at 09/08/2022 1516        185.4 cm (72.99\")    Physical Exam  Vitals reviewed.   Constitutional:       Appearance: Normal appearance.   HENT:      Head: Normocephalic.      Mouth/Throat:      Mouth: Mucous membranes are moist.   Eyes:      Extraocular Movements: Extraocular movements intact.   Neck:      Vascular: No carotid bruit.   Cardiovascular:      Rate and Rhythm: Normal rate and regular rhythm.      Pulses:           Dorsalis pedis pulses are 1+ on the right side and 1+ on the left side.        Posterior tibial pulses are 0 on the right side and 1+ on the left side.      Heart sounds: Normal heart sounds.   Pulmonary:      Effort: Pulmonary effort is normal.      Breath sounds: Normal breath sounds.   Abdominal:      General: Bowel sounds are normal.      Palpations: Abdomen is soft.   Musculoskeletal:         General: Normal range of motion.      Cervical back: Normal range of motion.      Right lower leg: " No edema.      Left lower leg: No edema.   Skin:     General: Skin is warm and dry.      Capillary Refill: Capillary refill takes less than 2 seconds.   Neurological:      General: No focal deficit present.      Mental Status: He is alert and oriented to person, place, and time.      Gait: Gait normal.   Psychiatric:         Mood and Affect: Mood normal.         Behavior: Behavior normal.         Results Review:   Lab Results   Component Value Date    WBC 8.76 09/09/2022    HGB 13.5 09/09/2022    HCT 40.9 09/09/2022    MCV 90.9 09/09/2022     09/09/2022     Lab Results   Component Value Date    GLUCOSE 98 09/09/2022    BUN 18 09/09/2022    CREATININE 1.03 09/09/2022    EGFRIFNONA 59 (L) 09/30/2021    BCR 17.5 09/09/2022    K 4.1 09/09/2022    CO2 25.0 09/09/2022    CALCIUM 9.1 09/09/2022    ALBUMIN 3.70 09/09/2022    AST 14 09/09/2022    ALT 6 09/09/2022             ASSESSMENT/PLAN         Arterial occlusion, lower extremity (HCC)    PVD (peripheral vascular disease) with claudication (HCC)      Assessment & Plan    Severe peripheral vascular disease with lifestyle limiting claudication and numbness RIGHT lower extremity. No ischemic tissue loss. Previously failed interventions now recommended maximum medical management per Dr. Aviles ([facility] Arterial duplex demonstrating severe inflow disease. Will proceed with CTA Runoff for evaluation of revascularization options. Dr. Narayan to review, recommendations pending.       Thank you for the opportunity to participate in this patient's care.        I discussed the patient's findings and my recommendations with the patient, Dr. Narayan.              This document has been electronically signed by RENALDO Cunningham on September 9, 2022 13:05 CDT

## 2022-09-09 NOTE — H&P
[facility] Medicine Admission      Date of Admission: 9/8/2022      Primary Care Physician: Lorenzo Mondragon MD      Chief Complaint: Toe redness    HPI:    Patient is a 66-year-old older than his stated age looking tangential male with known past medical history of myocardial infarction, CVA, hypercholesterinemia, hypertension, who presented to the ER with complaint of right great toe redness with decreased sensation in right lower leg and tingling for 2 to 3 weeks.  Patient was diagnosed with peripheral vascular disease.  Vascular surgery Dr. Vasquez was consulted with recommendation of placing patient on heparin drip.  Daytime hospitalist service was called for admission of the patient.    Patient was seen and examined in ED room 2.  Patient reports for the last 1 month he noticed change of color to his toes of the right foot regularly his right great toe.  On occasion and he has tingling and numbness to his foot, and weakness.  He reports about 2 months ago he was admitted to another hospital concerning potential GI bleeding.  At that time apparently he was on aspirin and Plavix.  His history of myocardial infarction and stroke.  Ports aspirin and Plavix was discontinued.  He reports he had EGD and colonoscopy done and he was told that he had hemorrhoidal bleeding.  He reports that he had some stents placed in the past to his leg or heart and he was told that his stents dissolved and  appeared with years.  He denies any recent history of fall injury trauma weight loss weight gain fever headache sore throat double vision blurred vision change to vision or hearing oropharyngeal lesion cough congestion neck pain chest pain shortness of breath syncope near syncope palpitation abdominal pain, URI UTI-like symptoms, constipation diarrhea, recent bleeding.    Concurrent Medical History:  has a past medical history of COPD (chronic obstructive pulmonary disease) (HCC),  Elevated cholesterol, Hypertension, Myocardial infarct (HCC) (2003), Stroke (HCC) (2015), and Weight decreasing.    Past Surgical History:  has a past surgical history that includes Esophagogastroduodenoscopy (N/A, 8/29/2017); Esophagogastroduodenoscopy (N/A, 6/11/2019); Esophagogastroduodenoscopy (N/A, 10/22/2020); and Esophagogastroduodenoscopy (N/A, 6/9/2022).    Family History: family history is not on file.  Reviewed with patient noncontributory and nonpertinent to presentation    Social History:  reports that he has quit smoking. He has never used smokeless tobacco. He reports previous alcohol use. He reports that he does not use drugs.    Allergies:   Allergies   Allergen Reactions   • Contrast Dye Itching       Medications:   Prior to Admission medications    Medication Sig Start Date End Date Taking? Authorizing Provider   albuterol (PROVENTIL HFA;VENTOLIN HFA) 108 (90 Base) MCG/ACT inhaler Inhale 2 puffs Every 4 (Four) Hours As Needed for Wheezing.   Yes Star Raman MD   atenolol (TENORMIN) 25 MG tablet Take 1 tablet by mouth Daily. 1/7/20  Yes Juliet Anderson APRN   atorvastatin (LIPITOR) 40 MG tablet Take 1 tablet by mouth Every Night. 1/6/20  Yes Juliet Anderson APRN   budesonide-formoterol (SYMBICORT) 160-4.5 MCG/ACT inhaler Inhale 2 puffs 2 (Two) Times a Day.   Yes Star Raman MD   cyclobenzaprine (FLEXERIL) 10 MG tablet Take 10 mg by mouth 3 (Three) Times a Day As Needed for Muscle Spasms.   Yes Star Raman MD   docusate sodium 100 MG capsule Take 100 mg by mouth Daily. 1/7/20  Yes Juliet Anderson APRN   gabapentin (NEURONTIN) 100 MG capsule Take 2 capsules by mouth 3 (Three) Times a Day. 1/6/20  Yes Juliet Anderson APRN   hydrOXYzine (ATARAX) 25 MG tablet Take 25 mg by mouth 3 (Three) Times a Day As Needed for Itching.   Yes Star Raman MD   albuterol (ACCUNEB) 1.25 MG/3ML nebulizer solution Take 1 ampule by nebulization Every 6 (Six) Hours As Needed for  Wheezing.    Provider, MD Star       Review of Systems:  Review of Systems   Constitutional: Negative for chills, diaphoresis, fatigue and fever.   HENT: Negative for congestion, dental problem, ear pain, facial swelling, rhinorrhea and sinus pressure.    Eyes: Negative for photophobia, discharge, redness, itching and visual disturbance.   Respiratory: Negative for apnea, cough, choking, chest tightness, shortness of breath, wheezing and stridor.    Cardiovascular: Negative for chest pain, palpitations and leg swelling.   Gastrointestinal: Negative for abdominal distention, abdominal pain, anal bleeding, blood in stool, diarrhea, nausea, rectal pain and vomiting.   Endocrine: Negative for cold intolerance, heat intolerance, polydipsia, polyphagia and polyuria.   Genitourinary: Negative for difficulty urinating, flank pain, frequency, hematuria and urgency.   Musculoskeletal: Negative for arthralgias, back pain, joint swelling and myalgias.   Skin: Positive for color change (toe right foot). Negative for pallor, rash and wound.   Allergic/Immunologic: Negative for environmental allergies and immunocompromised state.   Neurological: Positive for weakness. Negative for dizziness, tremors, seizures, facial asymmetry, speech difficulty, light-headedness, numbness and headaches.   Hematological: Negative for adenopathy. Does not bruise/bleed easily.   Psychiatric/Behavioral: Negative for agitation, behavioral problems and hallucinations. The patient is not nervous/anxious.       Otherwise complete ROS is negative except as mentioned above.    Physical Exam:   Temp:  [97.7 °F (36.5 °C)] 97.7 °F (36.5 °C)  Heart Rate:  [] 79  Resp:  [16-20] 20  BP: (151-193)/(79-94) 193/86  Physical Exam  Constitutional:       General: He is not in acute distress.     Appearance: He is not ill-appearing, toxic-appearing or diaphoretic.      Comments: Slim male   HENT:      Head: Normocephalic and atraumatic.      Right Ear:  External ear normal.      Left Ear: External ear normal.      Nose: Nose normal.      Mouth/Throat:      Mouth: Mucous membranes are moist.      Pharynx: Oropharynx is clear.   Eyes:      Extraocular Movements: Extraocular movements intact.      Conjunctiva/sclera: Conjunctivae normal.      Pupils: Pupils are equal, round, and reactive to light.   Cardiovascular:      Rate and Rhythm: Normal rate and regular rhythm.      Heart sounds: No murmur heard.    No friction rub. No gallop.   Pulmonary:      Effort: No respiratory distress.      Breath sounds: No stridor. No wheezing or rales.   Chest:      Chest wall: No tenderness.   Abdominal:      General: Abdomen is flat. There is no distension.      Palpations: Abdomen is soft.      Tenderness: There is no abdominal tenderness. There is no guarding or rebound.   Musculoskeletal:         General: No swelling or tenderness.      Cervical back: No rigidity or tenderness.      Right lower leg: No edema.      Left lower leg: No edema.      Comments: Feet slightly warm.  Slight bluish-reddish discoloration right great toe.   Lymphadenopathy:      Cervical: No cervical adenopathy.   Skin:     General: Skin is warm and dry.      Coloration: Skin is pale. Skin is not jaundiced.      Findings: No erythema.   Neurological:      Mental Status: He is alert and oriented to person, place, and time. Mental status is at baseline.      Sensory: No sensory deficit.      Motor: No weakness.      Coordination: Coordination normal.   Psychiatric:         Mood and Affect: Mood normal.         Behavior: Behavior normal.         Judgment: Judgment normal.           Results Reviewed:  I have personally reviewed current lab, radiology, and data and agree with results.  Lab Results (last 24 hours)     Procedure Component Value Units Date/Time    aPTT [[id number]] Collected: 09/08/22 2017    Specimen: Blood Updated: 09/08/22 2020    Extra Tubes [[id number]] Collected: 09/08/22 1814    Specimen:  Blood, Venous Line Updated: 09/08/22 1917    Narrative:      The following orders were created for panel order Extra Tubes.  Procedure                               Abnormality         Status                     ---------                               -----------         ------                     Gold Top - SST[[id number]]                                   Final result                 Please view results for these tests on the individual orders.    Gold Top - SST [[id number]] Collected: 09/08/22 1814    Specimen: Blood Updated: 09/08/22 1917     Extra Tube Hold for add-ons.     Comment: Auto resulted.       Protime-INR [[id number]]  (Normal) Collected: 09/08/22 1814    Specimen: Blood Updated: 09/08/22 1902     Protime 13.3 Seconds      INR 1.02    Narrative:      Therapeutic range for most indications is 2.0-3.0 INR,  or 2.5-3.5 for mechanical heart valves.    aPTT [[id number]]  (Normal) Collected: 09/08/22 1814    Specimen: Blood Updated: 09/08/22 1902     PTT 28.8 seconds     Narrative:      The recommended Heparin therapeutic range is 68-97 seconds.    Comprehensive Metabolic Panel [[id number]]  (Abnormal) Collected: 09/08/22 1814    Specimen: Blood Updated: 09/08/22 1838     Glucose 92 mg/dL      BUN 20 mg/dL      Creatinine 1.15 mg/dL      Sodium 140 mmol/L      Potassium 5.0 mmol/L      Chloride 103 mmol/L      CO2 31.0 mmol/L      Calcium 9.1 mg/dL      Total Protein 7.9 g/dL      Albumin 4.20 g/dL      ALT (SGPT) 7 U/L      AST (SGOT) 12 U/L      Alkaline Phosphatase 91 U/L      Total Bilirubin 0.2 mg/dL      Globulin 3.7 gm/dL      A/G Ratio 1.1 g/dL      BUN/Creatinine Ratio 17.4     Anion Gap 6.0 mmol/L      eGFR 70.2 mL/min/1.73      Comment: National Kidney Foundation and American Society of Nephrology (ASN) Task Force recommended calculation based on the Chronic Kidney Disease Epidemiology Collaboration (CKD-EPI) equation refit without adjustment for race.       Narrative:      GFR Normal >60  Chronic  Kidney Disease <60  Kidney Failure <15      Urinalysis With Microscopic If Indicated (No Culture) - Urine, Clean Catch [[id number]]  (Normal) Collected: 09/08/22 1817    Specimen: Urine, Clean Catch Updated: 09/08/22 1824     Color, UA Yellow     Appearance, UA Clear     pH, UA 7.5     Specific Gravity, UA 1.020     Glucose, UA Negative     Ketones, UA Negative     Bilirubin, UA Negative     Blood, UA Negative     Protein, UA Negative     Leuk Esterase, UA Negative     Nitrite, UA Negative     Urobilinogen, UA 1.0 E.U./dL    Narrative:      Urine microscopic not indicated.    CBC & Differential [[id number]]  (Normal) Collected: 09/08/22 1814    Specimen: Blood Updated: 09/08/22 1822    Narrative:      The following orders were created for panel order CBC & Differential.  Procedure                               Abnormality         Status                     ---------                               -----------         ------                     CBC Auto Differential[[id number]]        Normal              Final result                 Please view results for these tests on the individual orders.    CBC Auto Differential [[id number]]  (Normal) Collected: 09/08/22 1814    Specimen: Blood Updated: 09/08/22 1822     WBC 8.94 10*3/mm3      RBC 4.71 10*6/mm3      Hemoglobin 13.9 g/dL      Hematocrit 42.8 %      MCV 90.9 fL      MCH 29.5 pg      MCHC 32.5 g/dL      RDW 12.6 %      RDW-SD 41.9 fl      MPV 10.2 fL      Platelets 249 10*3/mm3      Neutrophil % 66.1 %      Lymphocyte % 20.9 %      Monocyte % 8.5 %      Eosinophil % 3.1 %      Basophil % 1.1 %      Immature Grans % 0.3 %      Neutrophils, Absolute 5.90 10*3/mm3      Lymphocytes, Absolute 1.87 10*3/mm3      Monocytes, Absolute 0.76 10*3/mm3      Eosinophils, Absolute 0.28 10*3/mm3      Basophils, Absolute 0.10 10*3/mm3      Immature Grans, Absolute 0.03 10*3/mm3      nRBC 0.0 /100 WBC         Imaging Results (Last 24 Hours)     Procedure Component Value Units Date/Time     US Arterial Doppler Lower Extremity Complete [[id number]] Collected: 09/08/22 1655     Updated: 09/08/22 1900    Narrative:      EXAM:    US Duplex Bilateral Lower Extremities Arteries    CLINICAL HISTORY:    The patient is 66 years old and is Male; Poor bloodflow    TECHNIQUE:    Real-time duplex ultrasound scan of the bilateral lower  extremity arteries integrating B-mode two-dimensional vascular  structure, Doppler spectral analysis and color flow Doppler  imaging.    COMPARISON:    CTA angiogram bilateral lower extremity 10/25/2021    FINDINGS:    RIGHT COMMON FEMORAL ARTERY:  Peak systolic velocity 19 cm/s.  Monophasic waveform.    RIGHT SUPERFICIAL FEMORAL ARTERY:  Proximal peak systolic  velocity 26 cm/s. Monophasic waveform. Mid peak systolic velocity  19 cm/s. Monophasic waveform. Distal peak systolic velocity 26  cm/s. Monophasic waveform.    RIGHT POPLITEAL ARTERY:  Peak systolic velocity 17 cm/s.  Monophasic waveform.    RIGHT CALF/FOOT ARTERIES:  Right peroneal artery: Peak systolic  velocity 14 cm/s. Monophasic waveform.  Right anterior tibial  artery: Peak systolic velocity 11 cm/s. Monophasic waveform.   Right posterior tibial artery: Occluded.      LEFT COMMON FEMORAL ARTERY:  Peak systolic velocity 97 cm/s.  Biphasic waveform.    LEFT DEEP FEMORAL ARTERY:  Peak systolic velocity 109 cm/s.  Biphasic waveform.    LEFT SUPERFICIAL FEMORAL ARTERY:  Proximal peak systolic  velocity 83 cm/s. Biphasic waveform. Mid peak systolic velocity  138 cm/s. Biphasic waveform. Distal peak systolic velocity 124  cm/s. Biphasic waveform.    LEFT POPLITEAL ARTERY:  Peak systolic velocity 72 cm/s.  Biphasic waveform.    LEFT CALF/FOOT ARTERIES:  Left peroneal artery: Peak systolic  velocity 51 cm/s.  Left anterior tibial artery: Peak systolic  velocity 39 cm/s. Biphasic waveform.  Left posterior tibial  artery: Peak systolic velocity 50 cm/s. Biphasic waveform.      SOFT TISSUES:  Unremarkable.      Impression:       1.  Severe right lower extremity inflow disease. Severe right  lower extremity peripheral arterial disease.  2.  Right posterior tibial artery occlusion.  3.  No significant stenosis of the left lower extremity arteries.    Electronically signed by:  Andrea Gonzalez MD  9/8/2022 6:58 PM CDT  Workstation: [id number]    US Venous Doppler Lower Extremity Bilateral (duplex) [[id number]] Collected: 09/08/22 1655     Updated: 09/08/22 1807    Narrative:      EXAM: US LOWER EXTREMITY VEINS BILATERAL    HISTORY: decreased circulation right lower, decreased pulses  bilateral    COMPARISON STUDY:      TECHNIQUE:  Grayscale, duplex and color Doppler sonogram of the bilateral  lower extremities was obtained.     FINDINGS:    There is complete coaptation with graded compression at all  visualized levels from the common femoral vein to the calf veins.  There is augmentation of the venous waveform with calf  compression and respiratory variation seen at appropriate levels.   No filling defect is seen.   No Baker's cyst seen.     Unremarkable left greater saphenous vein. Patient does not have  greater saphenous vein on the right.      Impression:      IMPRESSION  1.  Patient does not have greater saphenous vein on the right.  2.  No evidence of deep venous thrombosis of the bilateral lower  extremities.    Electronically signed by:  Karl Olivares MD  9/8/2022 6:05 PM CDT  Workstation: [id number]            Assessment:    Active Hospital Problems    Diagnosis    • Arterial occlusion, lower extremity (HCC)        # Severe right lower extremity peripheral vascular disease  # Right posterior tibial artery occlusion  #Uncontrolled hypertension  # History of MI   #History of cerebrovascular accidents   #History of GI bleeding with history of EGD and colonoscopy, per patient reports he was told he had hemorrhoidal bleeding, but aspirin and Plavix was discontinued about 2 months ago  # Hyperlipidemia  # COPD   #Underweight with BMI of 17.4           Plan:    Place on telemetry  Obtain further laboratory work-up including lipid panel hemoglobin A1c level TSH free T4 level, vitamin B12 folate level vitamin D level  Vascular surgical service was consulted in ED, await for input by Dr. Vasquez   Patient was  started in ED on heparin drip.  I discussed with the patient risks and benefits of heparin drip considering his reported history of GI bleeding.  Patient wished to continue  heparin drip for care of his severe vascular disease and saving his foot.  Continue Lipitor.  Avoid aspirin and Plavix at this time considering his history of GI bleeding.  Continue home medication atenolol for blood pressure control.    Place  on labetalol and hydralazine IV as needed for significantly elevated blood pressures with holding parameters.  Adjust antihypertensive medication as needed.  Avoid strict blood pressure control at this time considering his severe peripheral vascular disease to avoid exacerbating acute ischemia  Comorbidities will be treated appropriately  Obtain nutritional service consultation considering being underweight, patient may benefit from nutritional supplement.  Reconcile home medication and continue with essential home medications including his respiratory therapy medications  DVT and GI prophylaxis will be initiated  Please see orders for comprehensive plan.      I confirmed that the patient's Advance Care Plan is present, code status is documented, or surrogate decision maker is listed in the patient's medical record.   Patient decided for full code.  Patient decided that his wife Billie be his healthcare proxy    I have utilized all available immediate resources to obtain, update, or review the patient's current medications.     I discussed the patient's findings and my recommendations with: Patient and he agreed with above plan of care.      Saeid Behroozi, MD   09/08/22   20:22 CDT

## 2022-09-09 NOTE — CONSULTS
"Adult Nutrition  Assessment    Patient Name:  Aubrey Montilla  YOB: 1955  MRN: [id number]  Admit Date:  9/8/2022    Assessment Date:  9/9/2022    Comments:  Pt admitted with arterial occlusion and RD consulted for unintentional wt loss.  Pt has low BMI and MST of 2.  States he has a good appetite.  Has had chewing/swallowing difficulty in the past but denies the need for altered texture diet at this time.  Denies food allergies (gave food preferences) or n/v.  States BMs \"don't stop\".  Was 145# in March and now 136#.  [facility].  Agreed to boost on trays here and educated about importance of protein for weight gain.   NFPE conducted finding mostly moderate muscle/fat loss-see MSA below for details.  Pt meets ASPEN criteria for moderate chronic malnutrition based on physical findings (weight loss not significant for chronic malnutrition but pt has lost 6% since March).  Menu provided.  Pt made aware of available snacks.  RD to follow hospital course.      Reason for Assessment     Row Name 09/09/22 1050          Reason for Assessment    Reason For Assessment physician consult     Identified At Risk by Screening Criteria MST SCORE 2+;unintentional loss of 10 lbs or more in the past 2 mos;BMI                Nutrition/Diet History     Row Name 09/09/22 1050          Nutrition/Diet History    Typical Intake (Food/Fluid/EN/PN) good appetite     Additional Information Reports [facility]                Labs/Tests/Procedures/Meds     Row Name 09/09/22 1050          Labs/Procedures/Meds    Lab Results Reviewed reviewed            Diagnostic Tests/Procedures    Diagnostic Test/Procedure Reviewed reviewed            Medications    Pertinent Medications Reviewed reviewed, pertinent     Pertinent Medications Comments colace, solu-medrol, protonix, heparin-cont                Physical Findings     Row Name 09/09/22 1051          Physical Findings    Overall Physical Appearance " "alert, laying in bed                Estimated/Assessed Needs - Anthropometrics     Row Name 09/09/22 1051          Anthropometrics    Height 185.4 cm (72.99\")     Weight 62 kg (136 lb 11 oz)     Weight for Calculation 62 kg (136 lb 11 oz)     Additional Documentation Ideal Body Weight (IBW) (Group)            Ideal Body Weight (IBW)    Ideal Body Weight 184#            Estimated/Assessed Needs    Additional Documentation KCAL/KG (Group);Protein Requirements (Group)            KCAL/KG    KCAL/KG 30 Kcal/Kg (kcal)     30 Kcal/Kg (kcal) 1860            Protein Requirements    Weight Used For Protein Calculations 62 kg (136 lb 11 oz)     Est Protein Requirement Amount (gms/kg) 1.2 gm protein     Estimated Protein Requirements (gms/day) 74.4                Nutrition Prescription Ordered     Row Name 09/09/22 1052          Nutrition Prescription PO    Current PO Diet Regular     Common Modifiers Cardiac                  Malnutrition Severity Assessment     Row Name 09/09/22 1058          Malnutrition Severity Assessment    Malnutrition Type Chronic Disease - Related Malnutrition            Muscle Loss    Loss of Muscle Mass Findings Moderate     [place] Region Moderate - slight depression     Clavicle Bone Region Severe - protruding prominent bone     Acromion Bone Region Moderate - acromion may slightly protrude     Dorsal Hand Region Moderate - slight depression     Patellar Region Moderate - patella more prominent, less muscle definition around patella            Fat Loss    Subcutaneous Fat Loss Findings Moderate     Orbital Region  Moderate -  somewhat hollowness, slightly dark circles     Upper Arm Region Moderate - some fat tissue, not ample            Criteria Met (Must meet criteria for severity in at least 2 of these categories: M Wasting, Fat Loss, Fluid, Secondary Signs, Wt. Status, Intake)    Patient meets criteria for  Moderate (non-severe) Malnutrition                 Electronically signed by:  Laura " JANNET Patel  09/09/22 11:02 CDT

## 2022-09-09 NOTE — PROGRESS NOTES
[facility] Medicine   INPATIENT PROGRESS NOTE      Patient Name: Aubrey Montilla  Date of Admission: 9/8/2022  Today's Date: 09/09/22  Length of Stay: 0  Primary Care Physician: Lorenzo Mondragon MD    Subjective     HPI   Patient is a 66-year-old older than his stated age looking tangential male with known past medical history of myocardial infarction, CVA, hypercholesterinemia, hypertension, who presented to the ER with complaint of right great toe redness with decreased sensation in right lower leg and tingling for 2 to 3 weeks.  Patient was diagnosed with peripheral vascular disease.  Vascular surgery Dr. Vasquez was consulted with recommendation of placing patient on heparin drip.  Daytime hospitalist service was called for admission of the patient.     Patient was seen and examined in ED room 2.  Patient reports for the last 1 month he noticed change of color to his toes of the right foot regularly his right great toe.  On occasion and he has tingling and numbness to his foot, and weakness.  He reports about 2 months ago he was admitted to another hospital concerning potential GI bleeding.  At that time apparently he was on aspirin and Plavix.  His history of myocardial infarction and stroke.  Ports aspirin and Plavix was discontinued.  He reports he had EGD and colonoscopy done and he was told that he had hemorrhoidal bleeding.  He reports that he had some stents placed in the past to his leg or heart and he was told that his stents dissolved and  appeared with years.  He denies any recent history of fall injury trauma weight loss weight gain fever headache sore throat double vision blurred vision change to vision or hearing oropharyngeal lesion cough congestion neck pain chest pain shortness of breath syncope near syncope palpitation abdominal pain, URI UTI-like symptoms, constipation diarrhea, recent bleeding.    Daily Assessment   9/9   Patient  complains of right lower extremity numbness, pain.  Arterial Dopplers show severe flow-limiting disease.  Patient scheduled for CTA aorta with runoff today.  Patient also being evaluated by cardiothoracic surgery.  Fever, chills overnight.      Review of Systems   Constitutional: Negative.    HENT: Negative.    Eyes: Negative.    Respiratory: Negative.    Cardiovascular: Negative.    Gastrointestinal: Negative.    Endocrine: Negative.    Genitourinary: Negative.    Musculoskeletal:        Right lower extremity pain/numbness noted.  Limb also cold to touch.  Posterior tibial right pulse hard to palpate, but able to Doppler.        All pertinent negatives and positives are as above. All other systems have been reviewed and are negative unless otherwise stated.     Objective    Temp:  [97.2 °F (36.2 °C)-97.5 °F (36.4 °C)] 97.2 °F (36.2 °C)  Heart Rate:  [55-81] 66  Resp:  [16-20] 16  BP: (102-193)/(55-97) 120/55  Physical Exam  Constitutional:       Appearance: Normal appearance. He is normal weight.   HENT:      Head: Normocephalic and atraumatic.      Right Ear: External ear normal.      Left Ear: External ear normal.      Nose: Nose normal.      Mouth/Throat:      Mouth: Mucous membranes are dry.      Pharynx: Oropharynx is clear.   Eyes:      Extraocular Movements: Extraocular movements intact.      Conjunctiva/sclera: Conjunctivae normal.      Pupils: Pupils are equal, round, and reactive to light.   Cardiovascular:      Rate and Rhythm: Normal rate and regular rhythm.      Pulses: Normal pulses.      Heart sounds: Normal heart sounds.   Pulmonary:      Effort: Pulmonary effort is normal.      Breath sounds: Normal breath sounds.   Abdominal:      General: Abdomen is flat. Bowel sounds are normal.      Palpations: Abdomen is soft.   Musculoskeletal:      Cervical back: Normal range of motion and neck supple.      Comments: Right lower extremity posterior tib pulse hard to palpate, but able to appreciate with  Doppler.  Right lower extremity cold.  Some right lower extremity numbness noted during physical examination.   Skin:     General: Skin is warm and dry.      Capillary Refill: Capillary refill takes less than 2 seconds.   Neurological:      General: No focal deficit present.      Mental Status: He is alert.   Psychiatric:         Thought Content: Thought content normal.             Results Review:  I have reviewed the labs, radiology results, and diagnostic studies.    Laboratory Data:   Results from last 7 days   Lab Units 09/09/22  0331 09/08/22  1814   WBC 10*3/mm3 8.76 8.94   HEMOGLOBIN g/dL 13.5 13.9   HEMATOCRIT % 40.9 42.8   PLATELETS 10*3/mm3 212 249        Results from last 7 days   Lab Units 09/09/22  0331 09/08/22  1814   SODIUM mmol/L 138 140   POTASSIUM mmol/L 4.1 5.0   CHLORIDE mmol/L 105 103   CO2 mmol/L 25.0 31.0*   BUN mg/dL 18 20   CREATININE mg/dL 1.03 1.15   CALCIUM mg/dL 9.1 9.1   BILIRUBIN mg/dL 0.4 0.2   ALK PHOS U/L 84 91   ALT (SGPT) U/L 6 7   AST (SGOT) U/L 14 12   GLUCOSE mg/dL 98 92       Culture Data:   No results found for: BLOODCX  No results found for: URINECX  No results found for: RESPCX  No results found for: WOUNDCX  No results found for: STOOLCX  No components found for: BODYFLD    Radiology Data:   Imaging Results (Last 24 Hours)     Procedure Component Value Units Date/Time    US Arterial Doppler Lower Extremity Complete [[id number]] Collected: 09/08/22 1655     Updated: 09/08/22 1900    Narrative:      EXAM:    US Duplex Bilateral Lower Extremities Arteries    CLINICAL HISTORY:    The patient is 66 years old and is Male; Poor bloodflow    TECHNIQUE:    Real-time duplex ultrasound scan of the bilateral lower  extremity arteries integrating B-mode two-dimensional vascular  structure, Doppler spectral analysis and color flow Doppler  imaging.    COMPARISON:    CTA angiogram bilateral lower extremity 10/25/2021    FINDINGS:    RIGHT COMMON FEMORAL ARTERY:  Peak systolic velocity 19  cm/s.  Monophasic waveform.    RIGHT SUPERFICIAL FEMORAL ARTERY:  Proximal peak systolic  velocity 26 cm/s. Monophasic waveform. Mid peak systolic velocity  19 cm/s. Monophasic waveform. Distal peak systolic velocity 26  cm/s. Monophasic waveform.    RIGHT POPLITEAL ARTERY:  Peak systolic velocity 17 cm/s.  Monophasic waveform.    RIGHT CALF/FOOT ARTERIES:  Right peroneal artery: Peak systolic  velocity 14 cm/s. Monophasic waveform.  Right anterior tibial  artery: Peak systolic velocity 11 cm/s. Monophasic waveform.   Right posterior tibial artery: Occluded.      LEFT COMMON FEMORAL ARTERY:  Peak systolic velocity 97 cm/s.  Biphasic waveform.    LEFT DEEP FEMORAL ARTERY:  Peak systolic velocity 109 cm/s.  Biphasic waveform.    LEFT SUPERFICIAL FEMORAL ARTERY:  Proximal peak systolic  velocity 83 cm/s. Biphasic waveform. Mid peak systolic velocity  138 cm/s. Biphasic waveform. Distal peak systolic velocity 124  cm/s. Biphasic waveform.    LEFT POPLITEAL ARTERY:  Peak systolic velocity 72 cm/s.  Biphasic waveform.    LEFT CALF/FOOT ARTERIES:  Left peroneal artery: Peak systolic  velocity 51 cm/s.  Left anterior tibial artery: Peak systolic  velocity 39 cm/s. Biphasic waveform.  Left posterior tibial  artery: Peak systolic velocity 50 cm/s. Biphasic waveform.      SOFT TISSUES:  Unremarkable.      Impression:      1.  Severe right lower extremity inflow disease. Severe right  lower extremity peripheral arterial disease.  2.  Right posterior tibial artery occlusion.  3.  No significant stenosis of the left lower extremity arteries.    Electronically signed by:  Andrea Gonzalez MD  9/8/2022 6:58 PM CDT  Workstation: [id number]    US Venous Doppler Lower Extremity Bilateral (duplex) [[id number]] Collected: 09/08/22 1655     Updated: 09/08/22 1807    Narrative:      EXAM: US LOWER EXTREMITY VEINS BILATERAL    HISTORY: decreased circulation right lower, decreased pulses  bilateral    COMPARISON  STUDY:      TECHNIQUE:  Grayscale, duplex and color Doppler sonogram of the bilateral  lower extremities was obtained.     FINDINGS:    There is complete coaptation with graded compression at all  visualized levels from the common femoral vein to the calf veins.  There is augmentation of the venous waveform with calf  compression and respiratory variation seen at appropriate levels.   No filling defect is seen.   No Baker's cyst seen.     Unremarkable left greater saphenous vein. Patient does not have  greater saphenous vein on the right.      Impression:      IMPRESSION  1.  Patient does not have greater saphenous vein on the right.  2.  No evidence of deep venous thrombosis of the bilateral lower  extremities.    Electronically signed by:  Karl Olivares MD  9/8/2022 6:05 PM CDT  Workstation: [id number]          Scheduled Meds:atenolol, 25 mg, Oral, Daily  atorvastatin, 40 mg, Oral, Nightly  budesonide-formoterol, 2 puff, Inhalation, BID - RT  diphenhydrAMINE, 50 mg, Intravenous, Once  docusate sodium, 100 mg, Oral, Daily  gabapentin, 200 mg, Oral, TID  methylPREDNISolone sodium succinate, 40 mg, Intravenous, Q4H  pantoprazole, 40 mg, Oral, Q AM  sodium chloride, 10 mL, Intravenous, Q12H      Continuous Infusions:heparin, 16 Units/kg/hr, Last Rate: 16 Units/kg/hr (09/09/22 1448)      PRN Meds:.•  acetaminophen  •  cyclobenzaprine  •  [COMPLETED] heparin (porcine) **AND** heparin **AND** heparin (porcine) **AND** heparin (porcine)  •  hydrALAZINE  •  hydrOXYzine  •  labetalol  •  [COMPLETED] Insert peripheral IV **AND** sodium chloride  •  sodium chloride     Assessment/Plan     Active Hospital Problems    Diagnosis    • PVD (peripheral vascular disease) with claudication (HCC)    • Arterial occlusion, lower extremity (HCC)        Peripheral vascular disease RLE>>>>LLE:    Heparin GTT, cardiothoracic surgery currently evaluating, CTA with runoff planned for today.  Continue supportive care for neuropathic pain with  gabapentin.  Patient has history of IV contrast dye issues.  Patient will be premedicated with Solu-Medrol, Benadryl prior to IV contrast procedure today to evaluate peripheral vascular disease.    Pulmonary disease: Continue home budesonide-formoterol twice daily  Hyperlipidemia: Continue atorvastatin  GERD: Continue Protonix  Hypertension: Continue home management    PPx Heparin GTT  CODE STATUS Full  Diet: Regular    Discharge Planning: Progress, depending on outcome of vascular surgery consultation.  Electronically signed by Glenroy Frazier MD, 09/09/22, 16:10 CDT.

## 2022-09-09 NOTE — ED NOTES
Nursing report ED to floor  Aubrey Montilla  66 y.o.  male    HPI:   Chief Complaint   Patient presents with   • Leg Pain   • Foot Pain       Admitting doctor:   Pawel Pratt MD    Consulting provider(s):  Consults     Date and Time Order Name Status Description    9/8/2022  7:32 PM Vascular (on-call MD unless specified)             Admitting diagnosis:   The encounter diagnosis was Arterial occlusion, lower extremity (HCC).    Code status:   Current Code Status     Date Active Code Status Order ID Comments User Context       Prior    Advance Care Planning Activity          Allergies:   Contrast dye    Intake and Output  No intake or output data in the 24 hours ending 09/08/22 1957    Weight:       09/08/22  1516   Weight: 59.9 kg (132 lb)       Most recent vitals:   Vitals:    09/08/22 1600 09/08/22 1800 09/08/22 1809 09/08/22 1943   BP: (!) 182/79 153/89 158/94 (!) 193/86   BP Location:  Left arm  Left arm   Patient Position:  Sitting  Lying   Pulse: 84 81  79   Resp: 20 20  20   Temp:       SpO2: 98% 98% 96% 96%   Weight:       Height:           Active LDAs/IV Access:   Lines, Drains & Airways     Active LDAs     Name Placement date Placement time Site Days    Peripheral IV 09/08/22 1818 Right Antecubital 09/08/22 1818  Antecubital  less than 1                Labs (abnormal labs have a star):   Labs Reviewed   COMPREHENSIVE METABOLIC PANEL - Abnormal; Notable for the following components:       Result Value    CO2 31.0 (*)     All other components within normal limits    Narrative:     GFR Normal >60  Chronic Kidney Disease <60  Kidney Failure <15     URINALYSIS W/ MICROSCOPIC IF INDICATED (NO CULTURE) - Normal    Narrative:     Urine microscopic not indicated.   CBC WITH AUTO DIFFERENTIAL - Normal   PROTIME-INR - Normal    Narrative:     Therapeutic range for most indications is 2.0-3.0 INR,  or 2.5-3.5 for mechanical heart valves.   APTT - Normal    Narrative:     The recommended Heparin  therapeutic range is 68-97 seconds.   APTT   CBC AND DIFFERENTIAL    Narrative:     The following orders were created for panel order CBC & Differential.  Procedure                               Abnormality         Status                     ---------                               -----------         ------                     CBC Auto Differential[[id number]]        Normal              Final result                 Please view results for these tests on the individual orders.   EXTRA TUBES    Narrative:     The following orders were created for panel order Extra Tubes.  Procedure                               Abnormality         Status                     ---------                               -----------         ------                     Gold Top - SST[[id number]]                                   Final result                 Please view results for these tests on the individual orders.   GOLD TOP - SST       Meds given in ED:   Medications   sodium chloride 0.9 % flush 10 mL (has no administration in time range)           NIH Stroke Scale:       Isolation/Infection(s):  No active isolations   No active infections     COVID Testing  Collected- NA  Resulted-NA    Nursing report ED to floor:  Mental status: A&Ox4  Ambulatory status: ad sriena  Precautions: none    ED nurse phone extentsion- [phone]

## 2022-09-10 VITALS
TEMPERATURE: 97.5 F | OXYGEN SATURATION: 92 % | HEART RATE: 76 BPM | SYSTOLIC BLOOD PRESSURE: 126 MMHG | BODY MASS INDEX: 18.64 KG/M2 | HEIGHT: 73 IN | DIASTOLIC BLOOD PRESSURE: 75 MMHG | WEIGHT: 140.6 LBS | RESPIRATION RATE: 16 BRPM

## 2022-09-10 PROBLEM — E44.0 MODERATE MALNUTRITION (HCC): Status: ACTIVE | Noted: 2022-09-10

## 2022-09-10 LAB
APTT PPP: 110.7 SECONDS (ref 20–40.3)
APTT PPP: 48.2 SECONDS (ref 20–40.3)

## 2022-09-10 PROCEDURE — 94799 UNLISTED PULMONARY SVC/PX: CPT

## 2022-09-10 PROCEDURE — 25010000002 HEPARIN (PORCINE) PER 1000 UNITS: Performed by: THORACIC SURGERY (CARDIOTHORACIC VASCULAR SURGERY)

## 2022-09-10 PROCEDURE — 85730 THROMBOPLASTIN TIME PARTIAL: CPT | Performed by: INTERNAL MEDICINE

## 2022-09-10 PROCEDURE — G0378 HOSPITAL OBSERVATION PER HR: HCPCS

## 2022-09-10 PROCEDURE — 96376 TX/PRO/DX INJ SAME DRUG ADON: CPT

## 2022-09-10 PROCEDURE — 25010000002 HEPARIN (PORCINE) PER 1000 UNITS: Performed by: INTERNAL MEDICINE

## 2022-09-10 PROCEDURE — 97162 PT EVAL MOD COMPLEX 30 MIN: CPT

## 2022-09-10 PROCEDURE — 96366 THER/PROPH/DIAG IV INF ADDON: CPT

## 2022-09-10 PROCEDURE — 94760 N-INVAS EAR/PLS OXIMETRY 1: CPT

## 2022-09-10 RX ORDER — CILOSTAZOL 100 MG/1
100 TABLET ORAL 2 TIMES DAILY
Qty: 60 TABLET | Refills: 1 | Status: SHIPPED | OUTPATIENT
Start: 2022-09-10 | End: 2022-09-10 | Stop reason: RX

## 2022-09-10 RX ORDER — CILOSTAZOL 100 MG/1
100 TABLET ORAL 2 TIMES DAILY
Qty: 60 TABLET | Refills: 1 | Status: SHIPPED | OUTPATIENT
Start: 2022-09-10 | End: 2022-11-09

## 2022-09-10 RX ORDER — CILOSTAZOL 100 MG/1
100 TABLET ORAL 2 TIMES DAILY
Qty: 60 TABLET | Refills: 1 | Status: SHIPPED | OUTPATIENT
Start: 2022-09-10 | End: 2022-09-10 | Stop reason: SDUPTHER

## 2022-09-10 RX ADMIN — ATENOLOL 25 MG: 25 TABLET ORAL at 08:33

## 2022-09-10 RX ADMIN — GABAPENTIN 200 MG: 100 CAPSULE ORAL at 08:33

## 2022-09-10 RX ADMIN — BUDESONIDE AND FORMOTEROL FUMARATE DIHYDRATE 2 PUFF: 160; 4.5 AEROSOL RESPIRATORY (INHALATION) at 09:56

## 2022-09-10 RX ADMIN — HEPARIN SODIUM 2400 UNITS: 5000 INJECTION INTRAVENOUS; SUBCUTANEOUS at 12:37

## 2022-09-10 RX ADMIN — HEPARIN SODIUM 11 UNITS/KG/HR: 10000 INJECTION, SOLUTION INTRAVENOUS at 08:33

## 2022-09-10 RX ADMIN — GABAPENTIN 200 MG: 100 CAPSULE ORAL at 15:19

## 2022-09-10 RX ADMIN — PANTOPRAZOLE SODIUM 40 MG: 40 TABLET, DELAYED RELEASE ORAL at 05:40

## 2022-09-10 NOTE — NURSING NOTE
Spoke with AUSTEN Frazier about patient rx coverage and not being able to afford new medication (pletal). MD states he should be fine until Monday 9/12/2022 when [facility] is [facility]. A discount card was given to patient as well.

## 2022-09-10 NOTE — DISCHARGE SUMMARY
[facility] Medicine Services  DISCHARGE SUMMARY       Date of Admission: 9/8/2022  Date of Discharge:  9/10/2022  Primary Care Physician: Lorenzo Mondragon MD    Presenting Problem/History of Present Illness:  Arterial occlusion, lower extremity (HCC) [I70.209]   Patient is a 66-year-old older than his stated age looking tangential male with known past medical history of myocardial infarction, CVA, hypercholesterinemia, hypertension, who presented to the ER with complaint of right great toe redness with decreased sensation in right lower leg and tingling for 2 to 3 weeks.  Patient was diagnosed with peripheral vascular disease.  Vascular surgery Dr. Vasquez was consulted with recommendation of placing patient on heparin drip.  Daytime hospitalist service was called for admission of the patient.     Patient was seen and examined in ED room 2.  Patient reports for the last 1 month he noticed change of color to his toes of the right foot regularly his right great toe.  On occasion and he has tingling and numbness to his foot, and weakness.  He reports about 2 months ago he was admitted to another hospital concerning potential GI bleeding.  At that time apparently he was on aspirin and Plavix.  His history of myocardial infarction and stroke.  Ports aspirin and Plavix was discontinued.  He reports he had EGD and colonoscopy done and he was told that he had hemorrhoidal bleeding.  He reports that he had some stents placed in the past to his leg or heart and he was told that his stents dissolved and  appeared with years.  He denies any recent history of fall injury trauma weight loss weight gain fever headache sore throat double vision blurred vision change to vision or hearing oropharyngeal lesion cough congestion neck pain chest pain shortness of breath syncope near syncope palpitation abdominal pain, URI UTI-like symptoms, constipation diarrhea, recent  bleeding.       Final Discharge Diagnoses:  Active Hospital Problems    Diagnosis    • Moderate malnutrition (HCC)    • PVD (peripheral vascular disease) with claudication (HCC)    • Arterial occlusion, lower extremity (HCC)        Consults:   Consults     Date and Time Order Name Status Description    9/8/2022  7:32 PM Vascular (on-call MD unless specified) Completed           Procedures Performed:                 Pertinent Test Results:   Lab Results (most recent)     Procedure Component Value Units Date/Time    aPTT [[id number]]  (Abnormal) Collected: 09/10/22 1127    Specimen: Blood Updated: 09/10/22 1226     PTT 48.2 seconds     Narrative:      The recommended Heparin therapeutic range is 68-97 seconds.    aPTT [[id number]]  (Abnormal) Collected: 09/10/22 0443    Specimen: Blood Updated: 09/10/22 0510     .7 seconds     Narrative:      The recommended Heparin therapeutic range is 68-97 seconds.    Folate [[id number]]  (Normal) Collected: 09/09/22 0331    Specimen: Blood Updated: 09/09/22 1308     Folate 5.55 ng/mL     Narrative:      Results may be falsely increased if patient taking Biotin.      Vitamin D 25 Hydroxy [[id number]]  (Abnormal) Collected: 09/09/22 0331    Specimen: Blood Updated: 09/09/22 1308     25 Hydroxy, Vitamin D 19.4 ng/ml     Narrative:      Reference Range for Total Vitamin D 25(OH)     Deficiency <20.0 ng/mL   Insufficiency 21-29 ng/mL   Sufficiency  ng/mL  Toxicity >100 ng/ml    Results may be falsely increased if patient taking Biotin.      Vitamin B12 [[id number]]  (Normal) Collected: 09/09/22 0331    Specimen: Blood Updated: 09/09/22 1308     Vitamin B-12 504 pg/mL     Narrative:      Results may be falsely increased if patient taking Biotin.      T4, Free [[id number]]  (Normal) Collected: 09/09/22 0331    Specimen: Blood Updated: 09/09/22 0425     Free T4 1.48 ng/dL     Narrative:      Results may be falsely increased if patient taking Biotin.      TSH [[id number]]  (Normal)  Collected: 09/09/22 0331    Specimen: Blood Updated: 09/09/22 0425     TSH 1.870 uIU/mL     Comprehensive Metabolic Panel [[id number]] Collected: 09/09/22 0331    Specimen: Blood Updated: 09/09/22 0418     Glucose 98 mg/dL      BUN 18 mg/dL      Creatinine 1.03 mg/dL      Sodium 138 mmol/L      Potassium 4.1 mmol/L      Chloride 105 mmol/L      CO2 25.0 mmol/L      Calcium 9.1 mg/dL      Total Protein 7.3 g/dL      Albumin 3.70 g/dL      ALT (SGPT) 6 U/L      AST (SGOT) 14 U/L      Alkaline Phosphatase 84 U/L      Total Bilirubin 0.4 mg/dL      Globulin 3.6 gm/dL      A/G Ratio 1.0 g/dL      BUN/Creatinine Ratio 17.5     Anion Gap 8.0 mmol/L      eGFR 80.1 mL/min/1.73      Comment: National Kidney Foundation and American Society of Nephrology (ASN) Task Force recommended calculation based on the Chronic Kidney Disease Epidemiology Collaboration (CKD-EPI) equation refit without adjustment for race.       Narrative:      GFR Normal >60  Chronic Kidney Disease <60  Kidney Failure <15      Magnesium [[id number]]  (Normal) Collected: 09/09/22 0331    Specimen: Blood Updated: 09/09/22 0418     Magnesium 2.4 mg/dL     Hemoglobin A1c [[id number]]  (Normal) Collected: 09/09/22 0331    Specimen: Blood Updated: 09/09/22 0403     Hemoglobin A1C 5.40 %     Narrative:      Hemoglobin A1C Ranges:    Increased Risk for Diabetes  5.7% to 6.4%  Diabetes                     >= 6.5%  Diabetic Goal                < 7.0%    CBC & Differential [[id number]]  (Abnormal) Collected: 09/09/22 0331    Specimen: Blood Updated: 09/09/22 0354    Narrative:      The following orders were created for panel order CBC & Differential.  Procedure                               Abnormality         Status                     ---------                               -----------         ------                     CBC Auto Differential[[id number]]        Abnormal            Final result                 Please view results for these tests on the individual orders.     CBC Auto Differential [[id number]]  (Abnormal) Collected: 09/09/22 0331    Specimen: Blood Updated: 09/09/22 0354     WBC 8.76 10*3/mm3      RBC 4.50 10*6/mm3      Hemoglobin 13.5 g/dL      Hematocrit 40.9 %      MCV 90.9 fL      MCH 30.0 pg      MCHC 33.0 g/dL      RDW 12.5 %      RDW-SD 41.0 fl      MPV 10.7 fL      Platelets 212 10*3/mm3      Neutrophil % 58.3 %      Lymphocyte % 28.5 %      Monocyte % 7.9 %      Eosinophil % 3.2 %      Basophil % 1.1 %      Immature Grans % 1.0 %      Neutrophils, Absolute 5.10 10*3/mm3      Lymphocytes, Absolute 2.50 10*3/mm3      Monocytes, Absolute 0.69 10*3/mm3      Eosinophils, Absolute 0.28 10*3/mm3      Basophils, Absolute 0.10 10*3/mm3      Immature Grans, Absolute 0.09 10*3/mm3      nRBC 0.0 /100 WBC     Extra Tubes [[id number]] Collected: 09/08/22 1814    Specimen: Blood, Venous Line Updated: 09/08/22 1917    Narrative:      The following orders were created for panel order Extra Tubes.  Procedure                               Abnormality         Status                     ---------                               -----------         ------                     Gold Top - SST[[id number]]                                   Final result                 Please view results for these tests on the individual orders.    Gold Top - SST [[id number]] Collected: 09/08/22 1814    Specimen: Blood Updated: 09/08/22 1917     Extra Tube Hold for add-ons.     Comment: Auto resulted.       Protime-INR [[id number]]  (Normal) Collected: 09/08/22 1814    Specimen: Blood Updated: 09/08/22 1902     Protime 13.3 Seconds      INR 1.02    Narrative:      Therapeutic range for most indications is 2.0-3.0 INR,  or 2.5-3.5 for mechanical heart valves.    Comprehensive Metabolic Panel [[id number]]  (Abnormal) Collected: 09/08/22 1814    Specimen: Blood Updated: 09/08/22 1838     Glucose 92 mg/dL      BUN 20 mg/dL      Creatinine 1.15 mg/dL      Sodium 140 mmol/L      Potassium 5.0 mmol/L      Chloride  103 mmol/L      CO2 31.0 mmol/L      Calcium 9.1 mg/dL      Total Protein 7.9 g/dL      Albumin 4.20 g/dL      ALT (SGPT) 7 U/L      AST (SGOT) 12 U/L      Alkaline Phosphatase 91 U/L      Total Bilirubin 0.2 mg/dL      Globulin 3.7 gm/dL      A/G Ratio 1.1 g/dL      BUN/Creatinine Ratio 17.4     Anion Gap 6.0 mmol/L      eGFR 70.2 mL/min/1.73      Comment: National Kidney Foundation and American Society of Nephrology (ASN) Task Force recommended calculation based on the Chronic Kidney Disease Epidemiology Collaboration (CKD-EPI) equation refit without adjustment for race.       Narrative:      GFR Normal >60  Chronic Kidney Disease <60  Kidney Failure <15      Urinalysis With Microscopic If Indicated (No Culture) - Urine, Clean Catch [[id number]]  (Normal) Collected: 09/08/22 1817    Specimen: Urine, Clean Catch Updated: 09/08/22 1824     Color, UA Yellow     Appearance, UA Clear     pH, UA 7.5     Specific Gravity, UA 1.020     Glucose, UA Negative     Ketones, UA Negative     Bilirubin, UA Negative     Blood, UA Negative     Protein, UA Negative     Leuk Esterase, UA Negative     Nitrite, UA Negative     Urobilinogen, UA 1.0 E.U./dL    Narrative:      Urine microscopic not indicated.    CBC & Differential [[id number]]  (Normal) Collected: 09/08/22 1814    Specimen: Blood Updated: 09/08/22 1822    Narrative:      The following orders were created for panel order CBC & Differential.  Procedure                               Abnormality         Status                     ---------                               -----------         ------                     CBC Auto Differential[[id number]]        Normal              Final result                 Please view results for these tests on the individual orders.    CBC Auto Differential [[id number]]  (Normal) Collected: 09/08/22 1814    Specimen: Blood Updated: 09/08/22 1822     WBC 8.94 10*3/mm3      RBC 4.71 10*6/mm3      Hemoglobin 13.9 g/dL      Hematocrit 42.8 %      MCV  90.9 fL      MCH 29.5 pg      MCHC 32.5 g/dL      RDW 12.6 %      RDW-SD 41.9 fl      MPV 10.2 fL      Platelets 249 10*3/mm3      Neutrophil % 66.1 %      Lymphocyte % 20.9 %      Monocyte % 8.5 %      Eosinophil % 3.1 %      Basophil % 1.1 %      Immature Grans % 0.3 %      Neutrophils, Absolute 5.90 10*3/mm3      Lymphocytes, Absolute 1.87 10*3/mm3      Monocytes, Absolute 0.76 10*3/mm3      Eosinophils, Absolute 0.28 10*3/mm3      Basophils, Absolute 0.10 10*3/mm3      Immature Grans, Absolute 0.03 10*3/mm3      nRBC 0.0 /100 WBC         Imaging Results (Most Recent)     Procedure Component Value Units Date/Time    CT Angio Abdominal Aorta Bilateral Iliofem Runoff [[id number]] Collected: 09/09/22 2249     Updated: 09/10/22 0943    Narrative:      Exam: CT AIF WITH IV CONTRAST    Technique: A multislice scan was obtained from the diaphragm to  the feet with IV contrast using CT angiogram protocol.  Reformatted images were generated in the sagittal and coronal  planes. 3-D reformatted images were generated on an independent  workstation.  This exam was performed according to our departmental  dose-optimization program, which includes automated exposure  control, adjustment of the mA and/or kV according to the  patient's size and/or use of iterative reconstruction technique.     Study with 120 mL of Isovue-370    History:PVD, I70.209 Unspecified atherosclerosis of native  arteries of extremities, unspecified extremity    Comparison:    Findings:    Descriptors of Narrowing:  None (0%)  Mild (< 50%)  Moderate (50-70%)  Severe (70-90%)  Subtotal/Total Occlusion (%)  Non-Evaluable (technically non-diagnostic    Abdominal aorta: Mild demonstrated narrowing.  Celiac and superior mesenteric arteries: Mild demonstrated  narrowing.  Inferior mesenteric artery: Mild demonstrated narrowing.    Right renal artery(arteries): Moderate demonstrated narrowing.  Left renal artery(arteries): ModerateA demonstrated  narrowing.    Right common iliac artery: Mild demonstrated narrowing.  Right external iliac artery: Occluded proximally with retrograde  flow via collaterals distally  Right internal iliac artery: Moderate demonstrated narrowing.  Left common iliac artery: Mild demonstrated narrowing.  Left external iliac artery: Moderate demonstrated narrowing.  Left internal iliac artery: Severe demonstrated narrowing.    RIGHT LOWER EXTREMITY  Right common femoral artery: Moderate demonstrated narrowing.  Right profundus femoris: Mild demonstrated narrowing.  Right superficial femoral: Severe demonstrated narrowing  proximally, with occlusion close to the adductor hiatus.  Right popliteal artery: Occluded with no flow  Right tibioperoneal trunk: Moderate demonstrated narrowing, with  flow supplied via collaterals.  Right anterior tibial artery: Mild demonstrated narrowing.  Right posterior tibial artery: Mild demonstrated narrowing.  Right peroneal artery: Mild demonstrated narrowing.    LEFT LOWER EXTREMITY  Left common femoral artery: Mild demonstrated narrowing.  Left profundus femoris: Mild demonstrated narrowing.  Left superficial femoral: Moderate demonstrated narrowing.  Left popliteal artery: Mild demonstrated narrowing.  Left tibioperoneal trunk: Moderate demonstrated narrowing.  Left anterior tibial artery: Mild demonstrated narrowing.  Left posterior tibial artery: Mild demonstrated narrowing.  Left peroneal artery: Mild demonstrated narrowing.  ___________________________________    Impression:      Occlusion of right external iliac artery proximally with  retrograde flow via collaterals distally  More than narrowing of the left external iliac artery, with  severe narrowing close to the ostium of the left internal iliac  artery.  Severe demonstrated narrowing of the right superficial femoral  artery proximally, with occlusion close to the adductor hiatus.  Occlusion of the right popliteal artery.  Moderate narrowing  of the right tibioperoneal trunk with flow  supplied via collaterals and not from distal right popliteal  artery.  Moderate narrowing of the left superficial femoral artery and  left tibioperoneal trunk.    Electronically signed by:  Karl Olivares MD  9/10/2022 9:40 AM CDT  Workstation: [id number]    US Arterial Doppler Lower Extremity Complete [[id number]] Collected: 09/08/22 1655     Updated: 09/08/22 1900    Narrative:      EXAM:    US Duplex Bilateral Lower Extremities Arteries    CLINICAL HISTORY:    The patient is 66 years old and is Male; Poor bloodflow    TECHNIQUE:    Real-time duplex ultrasound scan of the bilateral lower  extremity arteries integrating B-mode two-dimensional vascular  structure, Doppler spectral analysis and color flow Doppler  imaging.    COMPARISON:    CTA angiogram bilateral lower extremity 10/25/2021    FINDINGS:    RIGHT COMMON FEMORAL ARTERY:  Peak systolic velocity 19 cm/s.  Monophasic waveform.    RIGHT SUPERFICIAL FEMORAL ARTERY:  Proximal peak systolic  velocity 26 cm/s. Monophasic waveform. Mid peak systolic velocity  19 cm/s. Monophasic waveform. Distal peak systolic velocity 26  cm/s. Monophasic waveform.    RIGHT POPLITEAL ARTERY:  Peak systolic velocity 17 cm/s.  Monophasic waveform.    RIGHT CALF/FOOT ARTERIES:  Right peroneal artery: Peak systolic  velocity 14 cm/s. Monophasic waveform.  Right anterior tibial  artery: Peak systolic velocity 11 cm/s. Monophasic waveform.   Right posterior tibial artery: Occluded.      LEFT COMMON FEMORAL ARTERY:  Peak systolic velocity 97 cm/s.  Biphasic waveform.    LEFT DEEP FEMORAL ARTERY:  Peak systolic velocity 109 cm/s.  Biphasic waveform.    LEFT SUPERFICIAL FEMORAL ARTERY:  Proximal peak systolic  velocity 83 cm/s. Biphasic waveform. Mid peak systolic velocity  138 cm/s. Biphasic waveform. Distal peak systolic velocity 124  cm/s. Biphasic waveform.    LEFT POPLITEAL ARTERY:  Peak systolic velocity 72 cm/s.  Biphasic waveform.    LEFT  CALF/FOOT ARTERIES:  Left peroneal artery: Peak systolic  velocity 51 cm/s.  Left anterior tibial artery: Peak systolic  velocity 39 cm/s. Biphasic waveform.  Left posterior tibial  artery: Peak systolic velocity 50 cm/s. Biphasic waveform.      SOFT TISSUES:  Unremarkable.      Impression:      1.  Severe right lower extremity inflow disease. Severe right  lower extremity peripheral arterial disease.  2.  Right posterior tibial artery occlusion.  3.  No significant stenosis of the left lower extremity arteries.    Electronically signed by:  Andrea Gonzalez MD  9/8/2022 6:58 PM CDT  Workstation: [id number]    US Venous Doppler Lower Extremity Bilateral (duplex) [[id number]] Collected: 09/08/22 1655     Updated: 09/08/22 1807    Narrative:      EXAM: US LOWER EXTREMITY VEINS BILATERAL    HISTORY: decreased circulation right lower, decreased pulses  bilateral    COMPARISON STUDY:      TECHNIQUE:  Grayscale, duplex and color Doppler sonogram of the bilateral  lower extremities was obtained.     FINDINGS:    There is complete coaptation with graded compression at all  visualized levels from the common femoral vein to the calf veins.  There is augmentation of the venous waveform with calf  compression and respiratory variation seen at appropriate levels.   No filling defect is seen.   No Baker's cyst seen.     Unremarkable left greater saphenous vein. Patient does not have  greater saphenous vein on the right.      Impression:      IMPRESSION  1.  Patient does not have greater saphenous vein on the right.  2.  No evidence of deep venous thrombosis of the bilateral lower  extremities.    Electronically signed by:  Karl Olivares MD  9/8/2022 6:05 PM CDT  Workstation: [id number]          Chief Complaint on Day of Discharge: None    Hospital Course:  The patient is a 66 y.o. male who presented to [facility] with right lower extremity numbness, coldness, and tingling.  Patient had arterial Dopplers done showing  "severe right lower extremity stenosis.  Patient then had CTA aorta with runoff during confirming right lower extremity stenosis.  Patient seen by vascular surgery during hospitalization, and recommended to follow-up as outpatient on Monday.  Patient maintained on heparin gtt. during hospitalization, and noted decreased numbness and increased function of right lower extremity with ambulation on heparin gtt. therapy.  Patient subsequently discharged home on Pletal therapy, and vascular surgery follow-up instructions.  Patient also advised to follow-up with primary care after hospital discharge.  Venous Doppler done during hospitalization showed no signs of DVT.    Condition on Discharge: Fair    Physical Exam on Discharge:  /75 (BP Location: Left arm, Patient Position: Lying)   Pulse 76   Temp 97.5 °F (36.4 °C) (Temporal)   Resp 16   Ht 185.4 cm (72.99\")   Wt 63.8 kg (140 lb 9.6 oz)   SpO2 92%   BMI 18.55 kg/m²    Patient Vitals for the past 24 hrs:   BP Temp Temp src Pulse Resp SpO2 Weight   09/10/22 1503 126/75 97.5 °F (36.4 °C) Temporal 76 16 92 % --   09/10/22 1147 153/67 97.4 °F (36.3 °C) Temporal 74 16 97 % --   09/10/22 0956 -- -- -- 72 16 97 % --   09/10/22 0804 -- -- -- 78 -- -- --   09/10/22 0700 135/60 97 °F (36.1 °C) Temporal 72 16 96 % --   09/10/22 0450 -- -- -- -- -- -- 63.8 kg (140 lb 9.6 oz)   09/10/22 0346 132/57 98.1 °F (36.7 °C) Temporal 70 16 94 % --   09/09/22 2343 -- -- -- 72 -- -- --   09/09/22 2308 130/63 97.2 °F (36.2 °C) Temporal 79 16 98 % --   09/09/22 1914 122/58 96.9 °F (36.1 °C) Temporal 79 16 96 % --     Physical Exam  Constitutional:       Appearance: Normal appearance. He is normal weight.   HENT:      Head: Normocephalic and atraumatic.      Right Ear: External ear normal.      Left Ear: External ear normal.      Nose: Nose normal.      Mouth/Throat:      Mouth: Mucous membranes are dry.      Pharynx: Oropharynx is clear.   Eyes:      Extraocular Movements: Extraocular " movements intact.      Conjunctiva/sclera: Conjunctivae normal.      Pupils: Pupils are equal, round, and reactive to light.   Cardiovascular:      Rate and Rhythm: Normal rate and regular rhythm.      Pulses: Normal pulses.      Heart sounds: Normal heart sounds.   Pulmonary:      Effort: Pulmonary effort is normal.      Breath sounds: Normal breath sounds.   Abdominal:      General: Abdomen is flat. Bowel sounds are normal.      Palpations: Abdomen is soft.   Musculoskeletal:      Cervical back: Normal range of motion and neck supple.      Comments: Right lower extremity posterior tib pulse hard to palpate, but able to appreciate with Doppler.  Right lower extremity cold.  Some right lower extremity numbness noted during physical examination.   Skin:     General: Skin is warm and dry.      Capillary Refill: Capillary refill takes less than 2 seconds.   Neurological:      General: No focal deficit present.      Mental Status: He is alert.   Psychiatric:         Thought Content: Thought content normal.        Discharge Disposition:  Home or Self Care    Discharge Medications:     Discharge Medications      New Medications      Instructions Start Date   cilostazol 100 MG tablet  Commonly known as: PLETAL   100 mg, Oral, 2 Times Daily         Continue These Medications      Instructions Start Date   albuterol sulfate  (90 Base) MCG/ACT inhaler  Commonly known as: PROVENTIL HFA;VENTOLIN HFA;PROAIR HFA   2 puffs, Inhalation, Every 4 Hours PRN      atenolol 25 MG tablet  Commonly known as: TENORMIN   25 mg, Oral, Daily      atorvastatin 40 MG tablet  Commonly known as: LIPITOR   40 mg, Oral, Nightly      budesonide-formoterol 160-4.5 MCG/ACT inhaler  Commonly known as: SYMBICORT   2 puffs, Inhalation, 2 Times Daily - RT      cyclobenzaprine 10 MG tablet  Commonly known as: FLEXERIL   10 mg, Oral, 3 Times Daily PRN      docusate sodium 100 MG capsule   100 mg, Oral, Daily      gabapentin 100 MG capsule  Commonly  known as: NEURONTIN   200 mg, Oral, 3 Times Daily      hydrOXYzine 25 MG tablet  Commonly known as: ATARAX   25 mg, Oral, 3 Times Daily PRN             Discharge Diet: Cardiac    Activity at Discharge: Slowly regain normal activity level    Discharge Care Plan/Instructions: See above    Follow-up Appointments: Follow-up with primary care physician and vascular surgery consult after hospital discharge.    No future appointments.    Test Results Pending at Discharge:         Time: 300pm

## 2022-09-10 NOTE — PLAN OF CARE
Problem: Tissue Perfusion Altered  Goal: Improved Tissue Perfusion  Outcome: Ongoing, Progressing     Problem: Adult Inpatient Plan of Care  Goal: Absence of Hospital-Acquired Illness or Injury  Intervention: Prevent Skin Injury  Recent Flowsheet Documentation  Taken 9/9/2022 1915 by Ese Sainz RN  Body Position: position changed independently     Problem: Adult Inpatient Plan of Care  Goal: Optimal Comfort and Wellbeing  Outcome: Ongoing, Progressing  Intervention: Provide Person-Centered Care  Recent Flowsheet Documentation  Taken 9/9/2022 1915 by Ese Sainz RN  Trust Relationship/Rapport:   questions answered   questions encouraged   care explained     Problem: Fall Injury Risk  Goal: Absence of Fall and Fall-Related Injury  Outcome: Ongoing, Progressing  Intervention: Identify and Manage Contributors  Recent Flowsheet Documentation  Taken 9/9/2022 1915 by Ese Sainz RN  Medication Review/Management: medications reviewed  Intervention: Promote Injury-Free Environment  Recent Flowsheet Documentation  Taken 9/9/2022 1915 by Ese Sainz RN  Safety Promotion/Fall Prevention:   safety round/check completed   nonskid shoes/slippers when out of bed   fall prevention program maintained   Goal Outcome Evaluation:

## 2022-09-10 NOTE — THERAPY EVALUATION
Patient Name: Aubrey Montilla  : 1955    MRN: [id number]                              Today's Date: 9/10/2022       Admit Date: 2022    Visit Dx:     ICD-10-CM ICD-9-CM   1. Arterial occlusion, lower extremity (HCC)  I70.209 444.22   2. Impaired functional mobility, balance, gait, and endurance  Z74.09 V49.89     Patient Active Problem List   Diagnosis   • Cerebrovascular accident (CVA) (HCC)   • Hypertension   • Elevated cholesterol   • Arterial occlusion, lower extremity (HCC)   • PVD (peripheral vascular disease) with claudication (HCC)     Past Medical History:   Diagnosis Date   • COPD (chronic obstructive pulmonary disease) (HCC)    • Elevated cholesterol    • Hypertension    • Myocardial infarct (HCC)    • Stroke ([facility])     3-4 strokes in past; last one --has affected memory/balance   • Weight decreasing     over last 3 weeks     Past Surgical History:   Procedure Laterality Date   • ENDOSCOPY N/A 2017    Procedure: ESOPHAGOGASTRODUODENOSCOPY;  Surgeon: Van Khoury DO;  Location: [facility] ENDOSCOPY;  Service:    • ENDOSCOPY N/A 2019    Procedure: ESOPHAGOGASTRODUODENOSCOPY;  Surgeon: Van Khoury DO;  Location: [facility] ENDOSCOPY;  Service: Gastroenterology   • ENDOSCOPY N/A 10/22/2020    Procedure: ESOPHAGOGASTRODUODENOSCOPY;  Surgeon: Van Khoury DO;  Location: [facility] ENDOSCOPY;  Service: Gastroenterology;  Laterality: N/A;  savory dilation 42-48   • ENDOSCOPY N/A 2022    Procedure: ESOPHAGOGASTRODUODENOSCOPY;  Surgeon: Van Khoury DO;  Location: [facility] ENDOSCOPY;  Service: Gastroenterology;  Laterality: N/A;      General Information     Row Name 09/10/22 1108          Physical Therapy Time and Intention    Document Type evaluation  -CORINNA     Mode of Treatment physical therapy  -CORINNA     Row Name 09/10/22 1108          General Information    Patient Profile Reviewed yes  -CORINNA     Prior Level of Function independent:;gait;transfer;home  management;cooking;cleaning;ADL's;driving;shopping;using stairs  -     Existing Precautions/Restrictions fall  -     Row Name 09/10/22 1108          Living Environment    People in Home spouse  -     Row Name 09/10/22 1108          Home Main Entrance    Number of Stairs, Main Entrance one  -     Stair Railings, Main Entrance railing on left side (ascending)  -     Row Name 09/10/22 1108          Stairs Within Home, Primary    Number of Stairs, Within Home, Primary none  -     Row Name 09/10/22 1108          Cognition    Orientation Status (Cognition) oriented x 4  -     Row Name 09/10/22 1108          Safety Issues, Functional Mobility    Safety Issues Affecting Function (Mobility) insight into deficits/self-awareness;safety precaution awareness  -     Impairments Affecting Function (Mobility) balance;endurance/activity tolerance;strength  -           User Key  (r) = Recorded By, (t) = Taken By, (c) = Cosigned By    Initials Name Provider Type    Luci Montes PT Physical Therapist               Mobility     Row Name 09/10/22 1108          Bed Mobility    Bed Mobility supine-sit;sit-supine  -     Supine-Sit Dickens (Bed Mobility) modified independence  -     Sit-Supine Dickens (Bed Mobility) modified independence  -     Row Name 09/10/22 1108          Sit-Stand Transfer    Sit-Stand Dickens (Transfers) standby assist  -     Assistive Device (Sit-Stand Transfers) walker, front-wheeled  -     Row Name 09/10/22 1108          Gait/Stairs (Locomotion)    Dickens Level (Gait) standby assist  -     Assistive Device (Gait) walker, front-wheeled  -     Distance in Feet (Gait) 80ft  -           User Key  (r) = Recorded By, (t) = Taken By, (c) = Cosigned By    Initials Name Provider Type    Luci Montes PT Physical Therapist               Obj/Interventions     Row Name 09/10/22 1108          Range of Motion Comprehensive    Comment, General Range of Motion AROM  WFL all extremities  -     Row Name 09/10/22 1108          Strength Comprehensive (MMT)    Comment, General Manual Muscle Testing (MMT) Assessment BUE: grossly 4/5 BLE: grossly 4/5  -     Row Name 09/10/22 1108          Sensory Assessment (Somatosensory)    Sensory Assessment (Somatosensory) UE sensation intact;left-sided sensation intact;other (see comments)  PT reports numbness and tingling in RLE  -CORINNA           User Key  (r) = Recorded By, (t) = Taken By, (c) = Cosigned By    Initials Name Provider Type    CORINNA Luci Macias, PT Physical Therapist               Goals/Plan     Row Name 09/10/22 1108          Transfer Goal 1 (PT)    Activity/Assistive Device (Transfer Goal 1, PT) sit-to-stand/stand-to-sit;bed-to-chair/chair-to-bed  -CORINNA     Fajardo Level/Cues Needed (Transfer Goal 1, PT) modified independence  -CORINNA     Time Frame (Transfer Goal 1, PT) by discharge  -CORINNA     Progress/Outcome (Transfer Goal 1, PT) goal not met  -     Row Name 09/10/22 1108          Gait Training Goal 1 (PT)    Activity/Assistive Device (Gait Training Goal 1, PT) gait (walking locomotion);assistive device use;decrease fall risk;increase endurance/gait distance;walker, rolling  -CORINNA     Fajardo Level (Gait Training Goal 1, PT) standby assist;modified independence  -CORINNA     Distance (Gait Training Goal 1, PT) 150ft or more  -CORINNA     Time Frame (Gait Training Goal 1, PT) by discharge  -CORINNA     Progress/Outcome (Gait Training Goal 1, PT) goal not met  -     Row Name 09/10/22 1108          Stairs Goal 1 (PT)    Activity/Assistive Device (Stairs Goal 1, PT) ascending stairs;descending stairs;using handrail, left;using handrail, right  -CORINNA     Fajardo Level/Cues Needed (Stairs Goal 1, PT) modified independence  -CORINNA     Number of Stairs (Stairs Goal 1, PT) 1  -CORINNA     Time Frame (Stairs Goal 1, PT) by discharge  -CORINNA     Progress/Outcome (Stairs Goal 1, PT) goal not met  -     Row Name 09/10/22 1108          Therapy  Assessment/Plan (PT)    Planned Therapy Interventions (PT) balance training;bed mobility training;gait training;patient/family education;stair training;strengthening;transfer training  -           User Key  (r) = Recorded By, (t) = Taken By, (c) = Cosigned By    Initials Name Provider Type    Luci Montes, PT Physical Therapist               Clinical Impression     Row Name 09/10/22 1108          Pain    Pretreatment Pain Rating 0/10 - no pain  -CORINNA     Posttreatment Pain Rating 0/10 - no pain  -CORINNA     Pre/Posttreatment Pain Comment reports in bed RLE has numbness and tingling; reports leg does hurt when he is on the leg  -     Additional Documentation Pain Scale: Numbers Pre/Post-Treatment (Group)  -     Row Name 09/10/22 1107          Plan of Care Review    Plan of Care Reviewed With patient  -CORINNA     Outcome Evaluation PT evaluation completed. Pt pleasant and agreeable to therapy. Pt modified independent with bed mobility. Pt transferred sit to stand to sit with FWW with SBA. Pt ambulated with FWW 80 ft with SBA with verbal and tactile cues for safe use of RW. Pt reports he has a cane at home he uses sometimes when RLE is unpredictable in how much walking can be tolerated. Pt may benefit from fixed wheel walker for increased stability. Function limited by decreased strength, balance, and tolerance for functional mobility and activities. Pt will benefit from PT to regain lost function. Anticipate home with assistance at discharge. If pt discharged prior to goals being met, Pt may benefit from HH therapy.  -     Row Name 09/10/22 1108          Therapy Assessment/Plan (PT)    Patient/Family Therapy Goals Statement (PT) improve mobility  -     Rehab Potential (PT) good, to achieve stated therapy goals  -     Criteria for Skilled Interventions Met (PT) yes;meets criteria;skilled treatment is necessary  -     Therapy Frequency (PT) other (see comments)  5-7 days/wk  -     Predicted Duration of  Therapy Intervention (PT) until discharge or goals met  -     Row Name 09/10/22 1108          Vital Signs    Pre Systolic BP Rehab 153  -     Pre Treatment Diastolic BP 67  -CORINNA     Pretreatment Heart Rate (beats/min) 74  -CORINNA     Posttreatment Heart Rate (beats/min) 92  -CORINNA     Pre SpO2 (%) 97  -CORINNA     O2 Delivery Pre Treatment room air  -CORINNA     Post SpO2 (%) 95  -CORINNA     O2 Delivery Post Treatment room air  -CORINNA     Pre Patient Position Supine  -CORINNA     Post Patient Position Supine  -CORINNA     Row Name 09/10/22 1108          Positioning and Restraints    Pre-Treatment Position in bed  -CORINNA     Post Treatment Position bed  sitting with HOB elevated eating lunch  -CORINNA     In Bed sitting;call light within reach;encouraged to call for assist;exit alarm on  -           User Key  (r) = Recorded By, (t) = Taken By, (c) = Cosigned By    Initials Name Provider Type    Luci Montes, PT Physical Therapist               Outcome Measures     Row Name 09/10/22 1108 09/10/22 0833       How much help from another person do you currently need...    Turning from your back to your side while in flat bed without using bedrails? 4  -CORINNA 3  -KH    Moving from lying on back to sitting on the side of a flat bed without bedrails? 3  -CORINNA 3  -KH    Moving to and from a bed to a chair (including a wheelchair)? 3  -CORINNA 3  -KH    Standing up from a chair using your arms (e.g., wheelchair, bedside chair)? 3  -CORINNA 3  -KH    Climbing 3-5 steps with a railing? 3  -CORINNA 3  -KH    To walk in hospital room? 3  -CORINNA 3  -KH    AM-PAC 6 Clicks Score (PT) 19  - 18  -    Highest level of mobility 6 --> Walked 10 steps or more  - 6 --> Walked 10 steps or more  -    Row Name 09/10/22 1108          Functional Assessment    Outcome Measure Options AM-PAC 6 Clicks Basic Mobility (PT)  -           User Key  (r) = Recorded By, (t) = Taken By, (c) = Cosigned By    Initials Name Provider Type    Luci Montes, PT Physical Therapist    Aarti Perry,  RN Registered Nurse                             Physical Therapy Education                 Title: PT OT SLP Therapies (In Progress)     Topic: Physical Therapy (In Progress)     Point: Mobility training (In Progress)     Learning Progress Summary           Patient Acceptance, E, NR by  at 9/10/2022 1418                   Point: Home exercise program (Not Started)     Learner Progress:  Not documented in this visit.          Point: Body mechanics (In Progress)     Learning Progress Summary           Patient Acceptance, E, NR by CORINNA at 9/10/2022 1418                   Point: Precautions (In Progress)     Learning Progress Summary           Patient Acceptance, E, NR by CORINNA at 9/10/2022 1418                               User Key     Initials Effective Dates Name Provider Type Discipline     06/16/21 -  Luci Macias, PT Physical Therapist PT              PT Recommendation and Plan  Planned Therapy Interventions (PT): balance training, bed mobility training, gait training, patient/family education, stair training, strengthening, transfer training  Plan of Care Reviewed With: patient  Outcome Evaluation: PT evaluation completed. Pt pleasant and agreeable to therapy. Pt modified independent with bed mobility. Pt transferred sit to stand to sit with FWW with SBA. Pt ambulated with FWW 80 ft with SBA with verbal and tactile cues for safe use of RW. Pt reports he has a cane at home he uses sometimes when RLE is unpredictable in how much walking can be tolerated. Pt may benefit from fixed wheel walker for increased stability. Function limited by decreased strength, balance, and tolerance for functional mobility and activities. Pt will benefit from PT to regain lost function. Anticipate home with assistance at discharge. If pt discharged prior to goals being met, Pt may benefit from HH therapy.     Time Calculation:    PT Charges     Row Name 09/10/22 1418             Time Calculation    Start Time 1108  -      Stop Time  1205  -CORINNA      Time Calculation (min) 57 min  -CORINNA      PT Received On 09/10/22  -CORINNA      PT Goal Re-Cert Due Date 09/23/22  -CORINNA              Time Calculation- PT    Total Timed Code Minutes- PT 0 minute(s)  -CORINNA              Untimed Charges    PT Eval/Re-eval Minutes 57  -CORINNA              Total Minutes    Untimed Charges Total Minutes 57  -CORINNA       Total Minutes 57  -CORINNA            User Key  (r) = Recorded By, (t) = Taken By, (c) = Cosigned By    Initials Name Provider Type    CORINNA Luci Macias, PT Physical Therapist              Therapy Charges for Today     Code Description Service Date Service Provider Modifiers Qty    88217736562 HC PT EVAL MOD COMPLEXITY 4 9/10/2022 Luci Macias, PT GP 1          PT G-Codes  Outcome Measure Options: AM-PAC 6 Clicks Basic Mobility (PT)  AM-PAC 6 Clicks Score (PT): 19    Luci Macias PT  9/10/2022

## 2022-09-10 NOTE — PLAN OF CARE
Problem: Adult Inpatient Plan of Care  Goal: Plan of Care Review  Recent Flowsheet Documentation  Taken 9/10/2022 1108 by Luci Macias PT  Plan of Care Reviewed With: patient  Outcome Evaluation: PT evaluation completed. Pt pleasant and agreeable to therapy. Pt modified independent with bed mobility. Pt transferred sit to stand to sit with FWW with SBA. Pt ambulated with FWW 80 ft with SBA with verbal and tactile cues for safe use of RW. Pt reports he has a cane at home he uses sometimes when RLE is unpredictable in how much walking can be tolerated. Pt may benefit from fixed wheel walker for increased stability. Function limited by decreased strength, balance, and tolerance for functional mobility and activities. Pt will benefit from PT to regain lost function. Anticipate home with assistance at discharge. If pt discharged prior to goals being met, Pt may benefit from HH therapy.   Goal Outcome Evaluation:  Plan of Care Reviewed With: patient           Outcome Evaluation: PT evaluation completed. Pt pleasant and agreeable to therapy. Pt modified independent with bed mobility. Pt transferred sit to stand to sit with FWW with SBA. Pt ambulated with FWW 80 ft with SBA with verbal and tactile cues for safe use of RW. Pt reports he has a cane at home he uses sometimes when RLE is unpredictable in how much walking can be tolerated. Pt may benefit from fixed wheel walker for increased stability. Function limited by decreased strength, balance, and tolerance for functional mobility and activities. Pt will benefit from PT to regain lost function. Anticipate home with assistance at discharge. If pt discharged prior to goals being met, Pt may benefit from HH therapy.

## 2022-09-12 DIAGNOSIS — I73.9 PVD (PERIPHERAL VASCULAR DISEASE) WITH CLAUDICATION: Primary | ICD-10-CM

## 2023-01-16 ENCOUNTER — OFFICE VISIT (OUTPATIENT)
Dept: CARDIAC SURGERY | Facility: CLINIC | Age: 68
End: 2023-01-16
Payer: OTHER GOVERNMENT

## 2023-01-16 VITALS
HEIGHT: 73 IN | DIASTOLIC BLOOD PRESSURE: 76 MMHG | OXYGEN SATURATION: 97 % | WEIGHT: 142 LBS | BODY MASS INDEX: 18.82 KG/M2 | HEART RATE: 112 BPM | TEMPERATURE: 97.7 F | SYSTOLIC BLOOD PRESSURE: 150 MMHG

## 2023-01-16 DIAGNOSIS — I10 PRIMARY HYPERTENSION: ICD-10-CM

## 2023-01-16 DIAGNOSIS — I63.9 CEREBROVASCULAR ACCIDENT (CVA), UNSPECIFIED MECHANISM: ICD-10-CM

## 2023-01-16 DIAGNOSIS — I73.9 PVD (PERIPHERAL VASCULAR DISEASE) WITH CLAUDICATION: ICD-10-CM

## 2023-01-16 DIAGNOSIS — E78.2 MIXED HYPERLIPIDEMIA: ICD-10-CM

## 2023-01-16 DIAGNOSIS — I73.9 PVD (PERIPHERAL VASCULAR DISEASE): Primary | ICD-10-CM

## 2023-01-16 PROCEDURE — 99215 OFFICE O/P EST HI 40 MIN: CPT | Performed by: THORACIC SURGERY (CARDIOTHORACIC VASCULAR SURGERY)

## 2023-01-16 RX ORDER — SODIUM CHLORIDE 0.9 % (FLUSH) 0.9 %
10 SYRINGE (ML) INJECTION AS NEEDED
Status: CANCELLED | OUTPATIENT
Start: 2023-02-07

## 2023-01-16 RX ORDER — SODIUM CHLORIDE 9 MG/ML
40 INJECTION, SOLUTION INTRAVENOUS AS NEEDED
Status: CANCELLED | OUTPATIENT
Start: 2023-02-07

## 2023-01-16 RX ORDER — SODIUM CHLORIDE 9 MG/ML
100 INJECTION, SOLUTION INTRAVENOUS CONTINUOUS
Status: CANCELLED | OUTPATIENT
Start: 2023-02-07

## 2023-01-16 RX ORDER — CLOPIDOGREL BISULFATE 75 MG/1
75 TABLET ORAL DAILY
COMMUNITY

## 2023-01-16 RX ORDER — SODIUM CHLORIDE 0.9 % (FLUSH) 0.9 %
10 SYRINGE (ML) INJECTION EVERY 12 HOURS SCHEDULED
Status: CANCELLED | OUTPATIENT
Start: 2023-02-07

## 2023-01-16 RX ORDER — BUPIVACAINE HCL/0.9 % NACL/PF 0.1 %
2 PLASTIC BAG, INJECTION (ML) EPIDURAL ONCE
Status: CANCELLED | OUTPATIENT
Start: 2023-02-07 | End: 2023-01-16

## 2023-01-16 NOTE — LETTER
"January 17, 2023     Katina Jiménez MD  [address] [place] [address]    Patient: Aubrey Montilla   YOB: 1955   Date of Visit: 1/16/2023       Dear Dr. Tino MD:    Thank you for referring Aubrey Montilla to me for evaluation. Below are the relevant portions of my assessment and plan of care.    If you have questions, please do not hesitate to call me. I look forward to following Aubrey along with you.         Sincerely,        Gopi Vasquez MD        CC: MD Pedro Browning Thomas Mark, MD  01/17/23 0725  Sign when Signing Visit  1/16/2023    Aubrey Montilla  1955    Chief Complaint:    Chief Complaint   Patient presents with   • Peripheral Vascular Disease       HPI:      PCP:  Dr Jiménez  Cardiology:  Dr Naik ([facility])    67 y.o. male with HTN(stable, increased risk stroke, rupture), Hyperlipidemia(stable, increased risk cardiovascular events) and COPD(stable, increased risk pulmonary complications)  CAD. CVA. PVD (unstable)  former smoker. Failed RIGHT Fem-Tib (2003). [facility]. Most recent evaluation Dr. Aviles ([facility]) [facility]. RLE pain x 1mo, worsening claudication. No TIA, stroke, or amaurosis.  No MI or claudication. No other associated signs, symptoms or modifying factors.     2003 RIGHT femoral tibial bypass (SVG)  2004 \"absorbable stents\"  [facility]  9/2022: Arterial duplex(BDM): Severe right lower extremity inflow disease. Severe right lower extremity peripheral arterial disease. Right posterior tibial artery occlusion.   1/2023 MJ:  RIGHT noncompressible, bi/monophasic.  LEFT .74 biphasic.    9/2022 Lower extremity venous duplex(BDM):  No dvt.  RIGHT GSV absent.     1/2020 ECG:  NSR 98, TQc 446  1/2020 Echocardiogram:  EF 45%, LA 32mm, LV 40mm.    The following portions of the patient's history were reviewed and updated as appropriate: allergies, current medications, past family history, past medical history, past social " history, past surgical history and problem list.  Recent images independently reviewed.  Available laboratory values reviewed.    PMH:  Past Medical History:   Diagnosis Date   • COPD (chronic obstructive pulmonary disease) (HCC)    • Elevated cholesterol    • Hypertension    • Myocardial infarct (HCC) 2003   • Stroke (HCC) 2015    3-4 strokes in past; last one 2015--has affected memory/balance   • Weight decreasing     over last 3 weeks     Past Surgical History:   Procedure Laterality Date   • ENDOSCOPY N/A 8/29/2017    Procedure: ESOPHAGOGASTRODUODENOSCOPY;  Surgeon: Van Khoury DO;  Location: [facility] ENDOSCOPY;  Service:    • ENDOSCOPY N/A 6/11/2019    Procedure: ESOPHAGOGASTRODUODENOSCOPY;  Surgeon: Van Khoury DO;  Location: [facility] ENDOSCOPY;  Service: Gastroenterology   • ENDOSCOPY N/A 10/22/2020    Procedure: ESOPHAGOGASTRODUODENOSCOPY;  Surgeon: Van Khoury DO;  Location: [facility] ENDOSCOPY;  Service: Gastroenterology;  Laterality: N/A;  savory dilation 42-48   • ENDOSCOPY N/A 6/9/2022    Procedure: ESOPHAGOGASTRODUODENOSCOPY;  Surgeon: Van Khoury DO;  Location: [facility] ENDOSCOPY;  Service: Gastroenterology;  Laterality: N/A;     No family history on file.  Social History     Tobacco Use   • Smoking status: Former   • Smokeless tobacco: Never   Vaping Use   • Vaping Use: Never used   Substance Use Topics   • Alcohol use: Not Currently   • Drug use: Never       ALLERGIES:  Allergies   Allergen Reactions   • Contrast Dye (Echo Or Unknown Ct/Mr) Itching         MEDICATIONS:    Current Outpatient Medications:   •  albuterol (PROVENTIL HFA;VENTOLIN HFA) 108 (90 Base) MCG/ACT inhaler, Inhale 2 puffs Every 4 (Four) Hours As Needed for Wheezing., Disp: , Rfl:   •  atenolol (TENORMIN) 25 MG tablet, Take 1 tablet by mouth Daily., Disp: , Rfl:   •  atorvastatin (LIPITOR) 40 MG tablet, Take 1 tablet by mouth Every Night., Disp: , Rfl:   •  budesonide-formoterol (SYMBICORT) 160-4.5 MCG/ACT  "inhaler, Inhale 2 puffs 2 (Two) Times a Day., Disp: , Rfl:   •  clopidogrel (PLAVIX) 75 MG tablet, Take 75 mg by mouth Daily., Disp: , Rfl:   •  cyclobenzaprine (FLEXERIL) 10 MG tablet, Take 10 mg by mouth 3 (Three) Times a Day As Needed for Muscle Spasms., Disp: , Rfl:   •  docusate sodium 100 MG capsule, Take 100 mg by mouth Daily., Disp: , Rfl:   •  gabapentin (NEURONTIN) 100 MG capsule, Take 2 capsules by mouth 3 (Three) Times a Day., Disp: 9 capsule, Rfl: 0    Review of Systems   Review of Systems   Constitutional: Positive for malaise/fatigue. Negative for weight loss.   Cardiovascular: Positive for claudication. Negative for chest pain and dyspnea on exertion.   Respiratory: Negative for cough and shortness of breath.    Skin: Positive for color change. Negative for poor wound healing.   Musculoskeletal: Positive for myalgias.   Neurological: Positive for numbness and paresthesias. Negative for dizziness and weakness.       Physical Exam   Vitals:    01/16/23 1427 01/16/23 1428   BP: 148/70 150/76   BP Location: Left arm Right arm   Pulse: 112    Temp: 97.7 °F (36.5 °C)    TempSrc: Infrared    SpO2: 97%    Weight: 64.4 kg (142 lb)    Height: 185.4 cm (73\")      Body surface area is 1.86 meters squared.  Body mass index is 18.73 kg/m².  Physical Exam  Constitutional:       General: He is not in acute distress.     Appearance: He is not ill-appearing.   HENT:      Right Ear: Hearing normal.      Left Ear: Hearing normal.      Nose: No nasal deformity.      Mouth/Throat:      Dentition: Normal dentition. Does not have dentures.   Cardiovascular:      Rate and Rhythm: Normal rate and regular rhythm.      Pulses:           Carotid pulses are 2+ on the right side and 2+ on the left side.       Radial pulses are 2+ on the right side and 2+ on the left side.        Dorsalis pedis pulses are 0 on the right side and 1+ on the left side.        Posterior tibial pulses are 0 on the right side and 1+ on the left side.     "  Heart sounds: No murmur heard.  Pulmonary:      Effort: Pulmonary effort is normal.      Breath sounds: Normal breath sounds.   Abdominal:      General: There is no distension.      Palpations: Abdomen is soft. There is no mass.      Tenderness: There is no abdominal tenderness.   Musculoskeletal:         General: No deformity.      Comments: Gait normal.    Skin:     General: Skin is warm and dry.      Coloration: Skin is not pale.      Findings: No erythema.      Comments: No venous staining   Neurological:      Mental Status: He is alert and oriented to person, place, and time.   Psychiatric:         Speech: Speech normal.         Behavior: Behavior is cooperative.         Thought Content: Thought content normal.         Judgment: Judgment normal.         BUN   Date Value Ref Range Status   09/09/2022 18 8 - 23 mg/dL Final     Creatinine   Date Value Ref Range Status   09/09/2022 1.03 0.76 - 1.27 mg/dL Final     eGFR Non  Amer   Date Value Ref Range Status   09/30/2021 59 (L) >60 mL/min/1.73 Final       ASSESSMENT:  Diagnoses and all orders for this visit:    1. PVD (peripheral vascular disease) (HCC) (Primary)  -     Case Request; Standing  -     Type & Screen; Future  -     sodium chloride 0.9 % infusion  -     ceFAZolin (ANCEF) 2 g in sodium chloride 0.9 % 100 mL IVPB  -     sodium chloride 0.9 % flush 10 mL  -     sodium chloride 0.9 % flush 10 mL  -     sodium chloride 0.9 % infusion 40 mL  -     Case Request    2. Primary hypertension    3. Mixed hyperlipidemia    4. PVD (peripheral vascular disease) with claudication ([facility])    5. Cerebrovascular accident (CVA), unspecified mechanism ([facility])    Other orders  -     Provide NPO Instructions to Patient; Future  -     Chlorhexidine Skin Prep; Future  -     Inpatient Admission; Standing  -     Obtain informed consent; Standing  -     Clip bilateral groins; Standing  -     Insert Arterial Line; Standing  -     Insert Peripheral IV; Standing  -     Saline  Lock & Maintain IV Access; Standing    PLAN:  Detailed discussion with Aubrey Montilla regarding situation, options and plans. severe lifestyle limiting claudication. femoral to femoral artery bypass is advisable.  Prior failed distal bypass, stents.  Will plan to address inflow only.    Risks including but not limited to Mortality, Major Morbidity 2-3%, bleeding, transfusion, infection, pulmonary (prolonged mechanical ventilation, tracheostomy), renal dysfunction (dialysis), blood vessel, nerve injury.  Benefits:  relief of symptoms, reduction in vascular events, limb loss and hospitalization.  Options:  peripheral angioplasty/stent, medical therapy, amputation discussed.  Understands and wishes to proceed.    femoral to femoral artery bypass. (PTFE). Lower extremity arteriogram.  GEN.  SDS.  2/7/2023    Return after above studies complete    Recommended regular physical activity, progressive walking program.  Continue current medications as directed.  Advance Care Planning   ACP discussion was declined by the patient. Patient does not have an advance directive, declines further assistance.    Thank you for the opportunity to participate in this patient's care.    Copy to primary care provider.

## 2023-01-17 PROBLEM — I73.9 PVD (PERIPHERAL VASCULAR DISEASE): Status: RESOLVED | Noted: 2023-01-16 | Resolved: 2023-01-17

## 2023-01-17 PROBLEM — E78.2 MIXED HYPERLIPIDEMIA: Status: ACTIVE | Noted: 2023-01-17

## 2023-01-17 NOTE — PROGRESS NOTES
"1/16/2023    Aubrey Montilla  1955    Chief Complaint:    Chief Complaint   Patient presents with   • Peripheral Vascular Disease       HPI:      PCP:  Dr Jiménez  Cardiology:  Dr Naik ([facility])    67 y.o. male with HTN(stable, increased risk stroke, rupture), Hyperlipidemia(stable, increased risk cardiovascular events) and COPD(stable, increased risk pulmonary complications)  CAD. CVA. PVD (chronic severe progression, increase risk cardiovascular events)  former smoker. Failed RIGHT Fem-Tib (2003). [facility]man/Keven. Most recent evaluation Dr. Aviels ([facility]) [facility]. RLE pain x 1mo, worsening claudication. No TIA, stroke, or amaurosis.  No MI or claudication. No other associated signs, symptoms or modifying factors.     2003 RIGHT femoral tibial bypass (SVG)  2004 \"absorbable stents\"  [facility]  9/2022: Arterial duplex(BDM): Severe right lower extremity inflow disease. Severe right lower extremity peripheral arterial disease. Right posterior tibial artery occlusion.   1/2023 MJ:  RIGHT noncompressible, bi/monophasic.  LEFT .74 biphasic.    9/2022 Lower extremity venous duplex(BDM):  No dvt.  RIGHT GSV absent.     1/2020 ECG:  NSR 98, TQc 446  1/2020 Echocardiogram:  EF 45%, LA 32mm, LV 40mm.    The following portions of the patient's history were reviewed and updated as appropriate: allergies, current medications, past family history, past medical history, past social history, past surgical history and problem list.  Recent images independently reviewed.  Available laboratory values reviewed.    PMH:  Past Medical History:   Diagnosis Date   • COPD (chronic obstructive pulmonary disease) (HCC)    • Elevated cholesterol    • Hypertension    • Myocardial infarct (HCC) 2003   • Stroke (HCC) 2015    3-4 strokes in past; last one 2015--has affected memory/balance   • Weight decreasing     over last 3 weeks     Past Surgical History:   Procedure Laterality Date   • ENDOSCOPY N/A 8/29/2017    " Procedure: ESOPHAGOGASTRODUODENOSCOPY;  Surgeon: Van Khoury DO;  Location: [facility] ENDOSCOPY;  Service:    • ENDOSCOPY N/A 6/11/2019    Procedure: ESOPHAGOGASTRODUODENOSCOPY;  Surgeon: Van Khoury DO;  Location: [facility] ENDOSCOPY;  Service: Gastroenterology   • ENDOSCOPY N/A 10/22/2020    Procedure: ESOPHAGOGASTRODUODENOSCOPY;  Surgeon: Van Khoury DO;  Location: [facility] ENDOSCOPY;  Service: Gastroenterology;  Laterality: N/A;  savory dilation 42-48   • ENDOSCOPY N/A 6/9/2022    Procedure: ESOPHAGOGASTRODUODENOSCOPY;  Surgeon: Van Khoury DO;  Location: [facility] ENDOSCOPY;  Service: Gastroenterology;  Laterality: N/A;     No family history on file.  Social History     Tobacco Use   • Smoking status: Former   • Smokeless tobacco: Never   Vaping Use   • Vaping Use: Never used   Substance Use Topics   • Alcohol use: Not Currently   • Drug use: Never       ALLERGIES:  Allergies   Allergen Reactions   • Contrast Dye (Echo Or Unknown Ct/Mr) Itching         MEDICATIONS:    Current Outpatient Medications:   •  albuterol (PROVENTIL HFA;VENTOLIN HFA) 108 (90 Base) MCG/ACT inhaler, Inhale 2 puffs Every 4 (Four) Hours As Needed for Wheezing., Disp: , Rfl:   •  atenolol (TENORMIN) 25 MG tablet, Take 1 tablet by mouth Daily., Disp: , Rfl:   •  atorvastatin (LIPITOR) 40 MG tablet, Take 1 tablet by mouth Every Night., Disp: , Rfl:   •  budesonide-formoterol (SYMBICORT) 160-4.5 MCG/ACT inhaler, Inhale 2 puffs 2 (Two) Times a Day., Disp: , Rfl:   •  clopidogrel (PLAVIX) 75 MG tablet, Take 75 mg by mouth Daily., Disp: , Rfl:   •  cyclobenzaprine (FLEXERIL) 10 MG tablet, Take 10 mg by mouth 3 (Three) Times a Day As Needed for Muscle Spasms., Disp: , Rfl:   •  docusate sodium 100 MG capsule, Take 100 mg by mouth Daily., Disp: , Rfl:   •  gabapentin (NEURONTIN) 100 MG capsule, Take 2 capsules by mouth 3 (Three) Times a Day., Disp: 9 capsule, Rfl: 0    Review of Systems   Review of Systems   Constitutional: Positive  "for malaise/fatigue. Negative for weight loss.   Cardiovascular: Positive for claudication. Negative for chest pain and dyspnea on exertion.   Respiratory: Negative for cough and shortness of breath.    Skin: Positive for color change. Negative for poor wound healing.   Musculoskeletal: Positive for myalgias.   Neurological: Positive for numbness and paresthesias. Negative for dizziness and weakness.       Physical Exam   Vitals:    01/16/23 1427 01/16/23 1428   BP: 148/70 150/76   BP Location: Left arm Right arm   Pulse: 112    Temp: 97.7 °F (36.5 °C)    TempSrc: Infrared    SpO2: 97%    Weight: 64.4 kg (142 lb)    Height: 185.4 cm (73\")      Body surface area is 1.86 meters squared.  Body mass index is 18.73 kg/m².  Physical Exam  Constitutional:       General: He is not in acute distress.     Appearance: He is not ill-appearing.   HENT:      Right Ear: Hearing normal.      Left Ear: Hearing normal.      Nose: No nasal deformity.      Mouth/Throat:      Dentition: Normal dentition. Does not have dentures.   Cardiovascular:      Rate and Rhythm: Normal rate and regular rhythm.      Pulses:           Carotid pulses are 2+ on the right side and 2+ on the left side.       Radial pulses are 2+ on the right side and 2+ on the left side.        Dorsalis pedis pulses are 0 on the right side and 1+ on the left side.        Posterior tibial pulses are 0 on the right side and 1+ on the left side.      Heart sounds: No murmur heard.  Pulmonary:      Effort: Pulmonary effort is normal.      Breath sounds: Normal breath sounds.   Abdominal:      General: There is no distension.      Palpations: Abdomen is soft. There is no mass.      Tenderness: There is no abdominal tenderness.   Musculoskeletal:         General: No deformity.      Comments: Gait normal.    Skin:     General: Skin is warm and dry.      Coloration: Skin is not pale.      Findings: No erythema.      Comments: No venous staining   Neurological:      Mental " Status: He is alert and oriented to person, place, and time.   Psychiatric:         Speech: Speech normal.         Behavior: Behavior is cooperative.         Thought Content: Thought content normal.         Judgment: Judgment normal.         BUN   Date Value Ref Range Status   09/09/2022 18 8 - 23 mg/dL Final     Creatinine   Date Value Ref Range Status   09/09/2022 1.03 0.76 - 1.27 mg/dL Final     eGFR Non  Amer   Date Value Ref Range Status   09/30/2021 59 (L) >60 mL/min/1.73 Final       ASSESSMENT:  Diagnoses and all orders for this visit:    1. PVD (peripheral vascular disease) (HCC) (Primary)  -     Case Request; Standing  -     Type & Screen; Future  -     sodium chloride 0.9 % infusion  -     ceFAZolin (ANCEF) 2 g in sodium chloride 0.9 % 100 mL IVPB  -     sodium chloride 0.9 % flush 10 mL  -     sodium chloride 0.9 % flush 10 mL  -     sodium chloride 0.9 % infusion 40 mL  -     Case Request    2. Primary hypertension    3. Mixed hyperlipidemia    4. PVD (peripheral vascular disease) with claudication ([facility])    5. Cerebrovascular accident (CVA), unspecified mechanism ([facility])    Other orders  -     Provide NPO Instructions to Patient; Future  -     Chlorhexidine Skin Prep; Future  -     Inpatient Admission; Standing  -     Obtain informed consent; Standing  -     Clip bilateral groins; Standing  -     Insert Arterial Line; Standing  -     Insert Peripheral IV; Standing  -     Saline Lock & Maintain IV Access; Standing    PLAN:  Detailed discussion with Aubrey Montilla regarding situation, options and plans. severe lifestyle limiting claudication. femoral to femoral artery bypass is advisable.  Prior failed distal bypass, stents.  Will plan to address inflow only.    Risks including but not limited to Mortality, Major Morbidity 2-3%, bleeding, transfusion, infection, pulmonary (prolonged mechanical ventilation, tracheostomy), renal dysfunction (dialysis), blood vessel, nerve injury.  Benefits:  relief  of symptoms, reduction in vascular events, limb loss and hospitalization.  Options:  peripheral angioplasty/stent, medical therapy, amputation discussed.  Understands and wishes to proceed.    femoral to femoral artery bypass. (PTFE). Lower extremity arteriogram.  GEN.  SDS.  2/7/2023    Return after above studies complete    Recommended regular physical activity, progressive walking program.  Continue current medications as directed.  Advance Care Planning   ACP discussion was declined by the patient. Patient does not have an advance directive, declines further assistance.     Thank you for the opportunity to participate in this patient's care.    Copy to primary care provider.

## 2023-01-18 ENCOUNTER — PATIENT ROUNDING (BHMG ONLY) (OUTPATIENT)
Dept: CARDIOLOGY | Facility: CLINIC | Age: 68
End: 2023-01-18
Payer: OTHER GOVERNMENT

## 2023-01-18 NOTE — PROGRESS NOTES
January 18, 2023    Hello, may I speak with Aubrey Montilla?    My name is MARTY Martin      I am  with [facility] CARDIOLOGY [facility] CARDIOLOG[facility]  [address] [place] [address] [phone].    Before we get started may I verify your date of birth? 1955    I am calling to officially welcome you to our practice and ask about your recent visit. Is this a good time to talk? yes    Tell me about your visit with us. What things went well?  Everything went well. He was really nice.        We're always looking for ways to make our patients' experiences even better. Do you have recommendations on ways we may improve?  no    Overall were you satisfied with your first visit to our practice? yes       I appreciate you taking the time to speak with me today. Is there anything else I can do for you? no      Thank you, and have a great day.

## 2023-01-31 ENCOUNTER — PRE-ADMISSION TESTING (OUTPATIENT)
Dept: PREADMISSION TESTING | Facility: HOSPITAL | Age: 68
End: 2023-01-31
Payer: OTHER GOVERNMENT

## 2023-01-31 VITALS
DIASTOLIC BLOOD PRESSURE: 78 MMHG | BODY MASS INDEX: 19.61 KG/M2 | HEART RATE: 85 BPM | WEIGHT: 148 LBS | HEIGHT: 73 IN | RESPIRATION RATE: 18 BRPM | OXYGEN SATURATION: 98 % | SYSTOLIC BLOOD PRESSURE: 138 MMHG

## 2023-01-31 DIAGNOSIS — I73.9 PVD (PERIPHERAL VASCULAR DISEASE): ICD-10-CM

## 2023-01-31 LAB
ABO GROUP BLD: NORMAL
BLD GP AB SCN SERPL QL: NEGATIVE
DEPRECATED RDW RBC AUTO: 42.3 FL (ref 37–54)
ERYTHROCYTE [DISTWIDTH] IN BLOOD BY AUTOMATED COUNT: 12.7 % (ref 12.3–15.4)
HCT VFR BLD AUTO: 45 % (ref 37.5–51)
HGB BLD-MCNC: 14.2 G/DL (ref 13–17.7)
Lab: NORMAL
MCH RBC QN AUTO: 28.9 PG (ref 26.6–33)
MCHC RBC AUTO-ENTMCNC: 31.6 G/DL (ref 31.5–35.7)
MCV RBC AUTO: 91.5 FL (ref 79–97)
PLATELET # BLD AUTO: 239 10*3/MM3 (ref 140–450)
PMV BLD AUTO: 10.5 FL (ref 6–12)
QT INTERVAL: 362 MS
QTC INTERVAL: 433 MS
RBC # BLD AUTO: 4.92 10*6/MM3 (ref 4.14–5.8)
RH BLD: POSITIVE
T&S EXPIRATION DATE: NORMAL
WBC NRBC COR # BLD: 8.18 10*3/MM3 (ref 3.4–10.8)

## 2023-01-31 PROCEDURE — 86850 RBC ANTIBODY SCREEN: CPT

## 2023-01-31 PROCEDURE — 86900 BLOOD TYPING SEROLOGIC ABO: CPT

## 2023-01-31 PROCEDURE — 36415 COLL VENOUS BLD VENIPUNCTURE: CPT

## 2023-01-31 PROCEDURE — 93005 ELECTROCARDIOGRAM TRACING: CPT

## 2023-01-31 PROCEDURE — 86901 BLOOD TYPING SEROLOGIC RH(D): CPT

## 2023-01-31 PROCEDURE — 93010 ELECTROCARDIOGRAM REPORT: CPT | Performed by: INTERNAL MEDICINE

## 2023-01-31 PROCEDURE — 85027 COMPLETE CBC AUTOMATED: CPT

## 2023-01-31 RX ORDER — CARBOXYMETHYLCELLULOSE SODIUM 5 MG/ML
1 SOLUTION/ DROPS OPHTHALMIC 4 TIMES DAILY PRN
COMMUNITY

## 2023-01-31 RX ORDER — DOCUSATE SODIUM 100 MG/1
100 CAPSULE, LIQUID FILLED ORAL 2 TIMES DAILY PRN
COMMUNITY

## 2023-02-06 ENCOUNTER — ANESTHESIA EVENT (OUTPATIENT)
Dept: PERIOP | Facility: HOSPITAL | Age: 68
DRG: 254 | End: 2023-02-06
Payer: OTHER GOVERNMENT

## 2023-02-07 ENCOUNTER — APPOINTMENT (OUTPATIENT)
Dept: GENERAL RADIOLOGY | Facility: HOSPITAL | Age: 68
DRG: 254 | End: 2023-02-07
Payer: OTHER GOVERNMENT

## 2023-02-07 ENCOUNTER — ANESTHESIA (OUTPATIENT)
Dept: PERIOP | Facility: HOSPITAL | Age: 68
DRG: 254 | End: 2023-02-07
Payer: OTHER GOVERNMENT

## 2023-02-07 ENCOUNTER — HOSPITAL ENCOUNTER (INPATIENT)
Facility: HOSPITAL | Age: 68
LOS: 2 days | Discharge: HOME | DRG: 254 | End: 2023-02-09
Attending: THORACIC SURGERY (CARDIOTHORACIC VASCULAR SURGERY) | Admitting: THORACIC SURGERY (CARDIOTHORACIC VASCULAR SURGERY)
Payer: OTHER GOVERNMENT

## 2023-02-07 DIAGNOSIS — I73.9 PVD (PERIPHERAL VASCULAR DISEASE): ICD-10-CM

## 2023-02-07 DIAGNOSIS — Z74.09 IMPAIRED FUNCTIONAL MOBILITY, BALANCE, GAIT, AND ENDURANCE: ICD-10-CM

## 2023-02-07 DIAGNOSIS — Z48.812 SURGICAL AFTERCARE, CIRCULATORY SYSTEM: Primary | ICD-10-CM

## 2023-02-07 LAB
ABO GROUP BLD: NORMAL
BLD GP AB SCN SERPL QL: NEGATIVE
Lab: NORMAL
RH BLD: POSITIVE
T&S EXPIRATION DATE: NORMAL

## 2023-02-07 PROCEDURE — 25010000002 FENTANYL CITRATE (PF) 250 MCG/5ML SOLUTION: Performed by: NURSE ANESTHETIST, CERTIFIED REGISTERED

## 2023-02-07 PROCEDURE — 86901 BLOOD TYPING SEROLOGIC RH(D): CPT | Performed by: ANESTHESIOLOGY

## 2023-02-07 PROCEDURE — 86900 BLOOD TYPING SEROLOGIC ABO: CPT | Performed by: ANESTHESIOLOGY

## 2023-02-07 PROCEDURE — 25010000002 HEPARIN (PORCINE) PER 1000 UNITS: Performed by: NURSE ANESTHETIST, CERTIFIED REGISTERED

## 2023-02-07 PROCEDURE — 85347 COAGULATION TIME ACTIVATED: CPT

## 2023-02-07 PROCEDURE — A4648 IMPLANTABLE TISSUE MARKER: HCPCS | Performed by: THORACIC SURGERY (CARDIOTHORACIC VASCULAR SURGERY)

## 2023-02-07 PROCEDURE — 94640 AIRWAY INHALATION TREATMENT: CPT

## 2023-02-07 PROCEDURE — 25010000002 NEOSTIGMINE 10 MG/10ML SOLUTION: Performed by: NURSE ANESTHETIST, CERTIFIED REGISTERED

## 2023-02-07 PROCEDURE — 25010000002 KETOROLAC TROMETHAMINE PER 15 MG: Performed by: THORACIC SURGERY (CARDIOTHORACIC VASCULAR SURGERY)

## 2023-02-07 PROCEDURE — 86850 RBC ANTIBODY SCREEN: CPT | Performed by: ANESTHESIOLOGY

## 2023-02-07 PROCEDURE — 94760 N-INVAS EAR/PLS OXIMETRY 1: CPT

## 2023-02-07 PROCEDURE — 25010000002 MIDAZOLAM PER 1 MG: Performed by: NURSE ANESTHETIST, CERTIFIED REGISTERED

## 2023-02-07 PROCEDURE — 35661 BPG FEMORAL-FEMORAL: CPT | Performed by: PHYSICIAN ASSISTANT

## 2023-02-07 PROCEDURE — 97608 NEG PRS WND THER NDME>50SQCM: CPT | Performed by: THORACIC SURGERY (CARDIOTHORACIC VASCULAR SURGERY)

## 2023-02-07 PROCEDURE — C1768 GRAFT, VASCULAR: HCPCS | Performed by: THORACIC SURGERY (CARDIOTHORACIC VASCULAR SURGERY)

## 2023-02-07 PROCEDURE — 25010000002 PROTAMINE SULFATE PER 10 MG: Performed by: NURSE ANESTHETIST, CERTIFIED REGISTERED

## 2023-02-07 PROCEDURE — 25010000002 DEXAMETHASONE PER 1 MG: Performed by: NURSE ANESTHETIST, CERTIFIED REGISTERED

## 2023-02-07 PROCEDURE — 25010000002 CEFAZOLIN PER 500 MG: Performed by: THORACIC SURGERY (CARDIOTHORACIC VASCULAR SURGERY)

## 2023-02-07 PROCEDURE — 35661 BPG FEMORAL-FEMORAL: CPT | Performed by: THORACIC SURGERY (CARDIOTHORACIC VASCULAR SURGERY)

## 2023-02-07 PROCEDURE — 25010000002 ONDANSETRON PER 1 MG: Performed by: THORACIC SURGERY (CARDIOTHORACIC VASCULAR SURGERY)

## 2023-02-07 PROCEDURE — 041K0JN BYPASS RIGHT FEMORAL ARTERY TO POSTERIOR TIBIAL ARTERY WITH SYNTHETIC SUBSTITUTE, OPEN APPROACH: ICD-10-PCS | Performed by: THORACIC SURGERY (CARDIOTHORACIC VASCULAR SURGERY)

## 2023-02-07 PROCEDURE — 25010000002 ONDANSETRON PER 1 MG: Performed by: NURSE ANESTHETIST, CERTIFIED REGISTERED

## 2023-02-07 PROCEDURE — 25010000002 HYDROMORPHONE PER 4 MG: Performed by: THORACIC SURGERY (CARDIOTHORACIC VASCULAR SURGERY)

## 2023-02-07 PROCEDURE — 25010000002 PHENYLEPHRINE 10 MG/ML SOLUTION 5 ML VIAL: Performed by: NURSE ANESTHETIST, CERTIFIED REGISTERED

## 2023-02-07 PROCEDURE — 25010000002 HEPARIN (PORCINE) PER 1000 UNITS: Performed by: THORACIC SURGERY (CARDIOTHORACIC VASCULAR SURGERY)

## 2023-02-07 PROCEDURE — 25010000002 VANCOMYCIN 1 G RECONSTITUTED SOLUTION: Performed by: THORACIC SURGERY (CARDIOTHORACIC VASCULAR SURGERY)

## 2023-02-07 PROCEDURE — 25010000002 PROPOFOL 200 MG/20ML EMULSION: Performed by: NURSE ANESTHETIST, CERTIFIED REGISTERED

## 2023-02-07 DEVICE — GRFT VASC PROPAT THNSTRCH REMVRNG6X50X40: Type: IMPLANTABLE DEVICE | Site: GROIN | Status: FUNCTIONAL

## 2023-02-07 DEVICE — HEMOST ABS SURGICEL SNOW 1X2IN: Type: IMPLANTABLE DEVICE | Site: GROIN | Status: FUNCTIONAL

## 2023-02-07 DEVICE — HEMOST ABS SURGIFOAM 8X6.25CM 10MM: Type: IMPLANTABLE DEVICE | Site: GROIN | Status: FUNCTIONAL

## 2023-02-07 RX ORDER — ONDANSETRON 2 MG/ML
4 INJECTION INTRAMUSCULAR; INTRAVENOUS ONCE AS NEEDED
Status: DISCONTINUED | OUTPATIENT
Start: 2023-02-07 | End: 2023-02-07 | Stop reason: HOSPADM

## 2023-02-07 RX ORDER — HEPARIN SODIUM 1000 [USP'U]/ML
INJECTION, SOLUTION INTRAVENOUS; SUBCUTANEOUS AS NEEDED
Status: DISCONTINUED | OUTPATIENT
Start: 2023-02-07 | End: 2023-02-07 | Stop reason: SURG

## 2023-02-07 RX ORDER — EPHEDRINE SULFATE 50 MG/ML
INJECTION INTRAVENOUS AS NEEDED
Status: DISCONTINUED | OUTPATIENT
Start: 2023-02-07 | End: 2023-02-07 | Stop reason: SURG

## 2023-02-07 RX ORDER — SODIUM CHLORIDE 0.9 % (FLUSH) 0.9 %
10 SYRINGE (ML) INJECTION AS NEEDED
Status: DISCONTINUED | OUTPATIENT
Start: 2023-02-07 | End: 2023-02-07 | Stop reason: HOSPADM

## 2023-02-07 RX ORDER — LIDOCAINE HYDROCHLORIDE 20 MG/ML
INJECTION, SOLUTION EPIDURAL; INFILTRATION; INTRACAUDAL; PERINEURAL AS NEEDED
Status: DISCONTINUED | OUTPATIENT
Start: 2023-02-07 | End: 2023-02-07 | Stop reason: SURG

## 2023-02-07 RX ORDER — ACETAMINOPHEN 500 MG
1000 TABLET ORAL EVERY 6 HOURS SCHEDULED
Status: DISCONTINUED | OUTPATIENT
Start: 2023-02-07 | End: 2023-02-09 | Stop reason: HOSPADM

## 2023-02-07 RX ORDER — ALBUTEROL SULFATE 2.5 MG/3ML
2.5 SOLUTION RESPIRATORY (INHALATION)
Status: DISCONTINUED | OUTPATIENT
Start: 2023-02-07 | End: 2023-02-09 | Stop reason: HOSPADM

## 2023-02-07 RX ORDER — CLOPIDOGREL BISULFATE 75 MG/1
75 TABLET ORAL DAILY
Status: DISCONTINUED | OUTPATIENT
Start: 2023-02-08 | End: 2023-02-09 | Stop reason: HOSPADM

## 2023-02-07 RX ORDER — ALBUTEROL SULFATE 90 UG/1
2 AEROSOL, METERED RESPIRATORY (INHALATION) EVERY 6 HOURS PRN
Status: DISCONTINUED | OUTPATIENT
Start: 2023-02-07 | End: 2023-02-07 | Stop reason: CLARIF

## 2023-02-07 RX ORDER — BUPIVACAINE HCL/0.9 % NACL/PF 0.1 %
2 PLASTIC BAG, INJECTION (ML) EPIDURAL EVERY 8 HOURS
Status: COMPLETED | OUTPATIENT
Start: 2023-02-07 | End: 2023-02-08

## 2023-02-07 RX ORDER — HYDROMORPHONE HCL IN 0.9% NACL 0.2 MG/ML
PLASTIC BAG, INJECTION (ML) INTRAVENOUS CONTINUOUS
Status: DISCONTINUED | OUTPATIENT
Start: 2023-02-07 | End: 2023-02-08

## 2023-02-07 RX ORDER — SODIUM CHLORIDE 9 MG/ML
INJECTION, SOLUTION INTRAVENOUS
Status: DISPENSED
Start: 2023-02-07 | End: 2023-02-08

## 2023-02-07 RX ORDER — DIPHENHYDRAMINE HYDROCHLORIDE 50 MG/ML
25 INJECTION INTRAMUSCULAR; INTRAVENOUS EVERY 6 HOURS PRN
Status: DISCONTINUED | OUTPATIENT
Start: 2023-02-07 | End: 2023-02-09 | Stop reason: HOSPADM

## 2023-02-07 RX ORDER — NALOXONE HCL 0.4 MG/ML
0.4 VIAL (ML) INJECTION AS NEEDED
Status: DISCONTINUED | OUTPATIENT
Start: 2023-02-07 | End: 2023-02-07 | Stop reason: HOSPADM

## 2023-02-07 RX ORDER — ACETAMINOPHEN 325 MG/1
650 TABLET ORAL ONCE AS NEEDED
Status: DISCONTINUED | OUTPATIENT
Start: 2023-02-07 | End: 2023-02-07 | Stop reason: HOSPADM

## 2023-02-07 RX ORDER — SODIUM CHLORIDE 9 MG/ML
100 INJECTION, SOLUTION INTRAVENOUS CONTINUOUS
Status: DISCONTINUED | OUTPATIENT
Start: 2023-02-07 | End: 2023-02-07

## 2023-02-07 RX ORDER — ROCURONIUM BROMIDE 10 MG/ML
INJECTION, SOLUTION INTRAVENOUS AS NEEDED
Status: DISCONTINUED | OUTPATIENT
Start: 2023-02-07 | End: 2023-02-07 | Stop reason: SURG

## 2023-02-07 RX ORDER — CARBOXYMETHYLCELLULOSE SODIUM 5 MG/ML
1 SOLUTION/ DROPS OPHTHALMIC 4 TIMES DAILY PRN
Status: DISCONTINUED | OUTPATIENT
Start: 2023-02-07 | End: 2023-02-09 | Stop reason: HOSPADM

## 2023-02-07 RX ORDER — NITROGLYCERIN 20 MG/100ML
5-200 INJECTION INTRAVENOUS
Status: DISCONTINUED | OUTPATIENT
Start: 2023-02-07 | End: 2023-02-08

## 2023-02-07 RX ORDER — ONDANSETRON 2 MG/ML
4 INJECTION INTRAMUSCULAR; INTRAVENOUS EVERY 6 HOURS PRN
Status: DISCONTINUED | OUTPATIENT
Start: 2023-02-07 | End: 2023-02-09 | Stop reason: HOSPADM

## 2023-02-07 RX ORDER — WATER 1000 ML/1000ML
INJECTION, SOLUTION INTRAVENOUS AS NEEDED
Status: DISCONTINUED | OUTPATIENT
Start: 2023-02-07 | End: 2023-02-07 | Stop reason: HOSPADM

## 2023-02-07 RX ORDER — SODIUM CHLORIDE 9 MG/ML
100 INJECTION, SOLUTION INTRAVENOUS CONTINUOUS
Status: DISCONTINUED | OUTPATIENT
Start: 2023-02-07 | End: 2023-02-09 | Stop reason: HOSPADM

## 2023-02-07 RX ORDER — DOCUSATE SODIUM 100 MG/1
100 CAPSULE, LIQUID FILLED ORAL 2 TIMES DAILY PRN
Status: DISCONTINUED | OUTPATIENT
Start: 2023-02-07 | End: 2023-02-09 | Stop reason: HOSPADM

## 2023-02-07 RX ORDER — ONDANSETRON 4 MG/1
4 TABLET, FILM COATED ORAL EVERY 6 HOURS PRN
Status: DISCONTINUED | OUTPATIENT
Start: 2023-02-07 | End: 2023-02-09 | Stop reason: HOSPADM

## 2023-02-07 RX ORDER — SODIUM CHLORIDE, SODIUM GLUCONATE, SODIUM ACETATE, POTASSIUM CHLORIDE AND MAGNESIUM CHLORIDE 526; 502; 368; 37; 30 MG/100ML; MG/100ML; MG/100ML; MG/100ML; MG/100ML
INJECTION, SOLUTION INTRAVENOUS CONTINUOUS PRN
Status: DISCONTINUED | OUTPATIENT
Start: 2023-02-07 | End: 2023-02-07 | Stop reason: SURG

## 2023-02-07 RX ORDER — NEOSTIGMINE METHYLSULFATE 1 MG/ML
INJECTION, SOLUTION INTRAVENOUS AS NEEDED
Status: DISCONTINUED | OUTPATIENT
Start: 2023-02-07 | End: 2023-02-07 | Stop reason: SURG

## 2023-02-07 RX ORDER — KETOROLAC TROMETHAMINE 15 MG/ML
15 INJECTION, SOLUTION INTRAMUSCULAR; INTRAVENOUS ONCE
Status: COMPLETED | OUTPATIENT
Start: 2023-02-07 | End: 2023-02-07

## 2023-02-07 RX ORDER — VANCOMYCIN HYDROCHLORIDE 1 G/20ML
INJECTION, POWDER, LYOPHILIZED, FOR SOLUTION INTRAVENOUS AS NEEDED
Status: DISCONTINUED | OUTPATIENT
Start: 2023-02-07 | End: 2023-02-07 | Stop reason: HOSPADM

## 2023-02-07 RX ORDER — PROPOFOL 10 MG/ML
INJECTION, EMULSION INTRAVENOUS AS NEEDED
Status: DISCONTINUED | OUTPATIENT
Start: 2023-02-07 | End: 2023-02-07 | Stop reason: SURG

## 2023-02-07 RX ORDER — PROTAMINE SULFATE 10 MG/ML
INJECTION, SOLUTION INTRAVENOUS AS NEEDED
Status: DISCONTINUED | OUTPATIENT
Start: 2023-02-07 | End: 2023-02-07 | Stop reason: SURG

## 2023-02-07 RX ORDER — ATORVASTATIN CALCIUM 40 MG/1
40 TABLET, FILM COATED ORAL NIGHTLY
Status: DISCONTINUED | OUTPATIENT
Start: 2023-02-07 | End: 2023-02-09 | Stop reason: HOSPADM

## 2023-02-07 RX ORDER — DEXAMETHASONE SODIUM PHOSPHATE 4 MG/ML
INJECTION, SOLUTION INTRA-ARTICULAR; INTRALESIONAL; INTRAMUSCULAR; INTRAVENOUS; SOFT TISSUE AS NEEDED
Status: DISCONTINUED | OUTPATIENT
Start: 2023-02-07 | End: 2023-02-07 | Stop reason: SURG

## 2023-02-07 RX ORDER — ACETAMINOPHEN 650 MG/1
650 SUPPOSITORY RECTAL ONCE AS NEEDED
Status: DISCONTINUED | OUTPATIENT
Start: 2023-02-07 | End: 2023-02-07 | Stop reason: HOSPADM

## 2023-02-07 RX ORDER — BUDESONIDE AND FORMOTEROL FUMARATE DIHYDRATE 160; 4.5 UG/1; UG/1
2 AEROSOL RESPIRATORY (INHALATION)
Status: DISCONTINUED | OUTPATIENT
Start: 2023-02-07 | End: 2023-02-09 | Stop reason: HOSPADM

## 2023-02-07 RX ORDER — ALBUTEROL SULFATE 2.5 MG/3ML
2.5 SOLUTION RESPIRATORY (INHALATION) EVERY 6 HOURS PRN
Status: DISCONTINUED | OUTPATIENT
Start: 2023-02-07 | End: 2023-02-09 | Stop reason: HOSPADM

## 2023-02-07 RX ORDER — SODIUM CHLORIDE 9 MG/ML
40 INJECTION, SOLUTION INTRAVENOUS AS NEEDED
Status: DISCONTINUED | OUTPATIENT
Start: 2023-02-07 | End: 2023-02-07 | Stop reason: HOSPADM

## 2023-02-07 RX ORDER — SODIUM CHLORIDE 0.9 % (FLUSH) 0.9 %
10 SYRINGE (ML) INJECTION EVERY 12 HOURS SCHEDULED
Status: DISCONTINUED | OUTPATIENT
Start: 2023-02-07 | End: 2023-02-07 | Stop reason: HOSPADM

## 2023-02-07 RX ORDER — FLUMAZENIL 0.1 MG/ML
0.2 INJECTION INTRAVENOUS AS NEEDED
Status: DISCONTINUED | OUTPATIENT
Start: 2023-02-07 | End: 2023-02-07 | Stop reason: HOSPADM

## 2023-02-07 RX ORDER — DIPHENHYDRAMINE HYDROCHLORIDE 50 MG/ML
12.5 INJECTION INTRAMUSCULAR; INTRAVENOUS
Status: DISCONTINUED | OUTPATIENT
Start: 2023-02-07 | End: 2023-02-07 | Stop reason: HOSPADM

## 2023-02-07 RX ORDER — FENTANYL CITRATE 50 UG/ML
INJECTION, SOLUTION INTRAMUSCULAR; INTRAVENOUS AS NEEDED
Status: DISCONTINUED | OUTPATIENT
Start: 2023-02-07 | End: 2023-02-07 | Stop reason: SURG

## 2023-02-07 RX ORDER — EPHEDRINE SULFATE 50 MG/ML
5 INJECTION, SOLUTION INTRAVENOUS ONCE AS NEEDED
Status: DISCONTINUED | OUTPATIENT
Start: 2023-02-07 | End: 2023-02-07 | Stop reason: HOSPADM

## 2023-02-07 RX ORDER — ONDANSETRON 2 MG/ML
INJECTION INTRAMUSCULAR; INTRAVENOUS AS NEEDED
Status: DISCONTINUED | OUTPATIENT
Start: 2023-02-07 | End: 2023-02-07 | Stop reason: SURG

## 2023-02-07 RX ORDER — MIDAZOLAM HYDROCHLORIDE 1 MG/ML
INJECTION INTRAMUSCULAR; INTRAVENOUS AS NEEDED
Status: DISCONTINUED | OUTPATIENT
Start: 2023-02-07 | End: 2023-02-07 | Stop reason: SURG

## 2023-02-07 RX ORDER — NALOXONE HCL 0.4 MG/ML
0.1 VIAL (ML) INJECTION
Status: DISCONTINUED | OUTPATIENT
Start: 2023-02-07 | End: 2023-02-09 | Stop reason: HOSPADM

## 2023-02-07 RX ORDER — BUPIVACAINE HCL/0.9 % NACL/PF 0.1 %
2 PLASTIC BAG, INJECTION (ML) EPIDURAL ONCE
Status: COMPLETED | OUTPATIENT
Start: 2023-02-07 | End: 2023-02-07

## 2023-02-07 RX ORDER — SODIUM CHLORIDE 9 MG/ML
INJECTION, SOLUTION INTRAVENOUS AS NEEDED
Status: DISCONTINUED | OUTPATIENT
Start: 2023-02-07 | End: 2023-02-07 | Stop reason: HOSPADM

## 2023-02-07 RX ORDER — HEPARIN SODIUM 5000 [USP'U]/ML
INJECTION, SOLUTION INTRAVENOUS; SUBCUTANEOUS AS NEEDED
Status: DISCONTINUED | OUTPATIENT
Start: 2023-02-07 | End: 2023-02-07 | Stop reason: HOSPADM

## 2023-02-07 RX ORDER — CYCLOBENZAPRINE HCL 5 MG
5 TABLET ORAL 3 TIMES DAILY PRN
Status: DISCONTINUED | OUTPATIENT
Start: 2023-02-07 | End: 2023-02-09 | Stop reason: HOSPADM

## 2023-02-07 RX ADMIN — HYDROMORPHONE HYDROCHLORIDE: 2 INJECTION INTRAMUSCULAR; INTRAVENOUS; SUBCUTANEOUS at 19:42

## 2023-02-07 RX ADMIN — FENTANYL CITRATE 50 MCG: 50 INJECTION, SOLUTION INTRAMUSCULAR; INTRAVENOUS at 15:03

## 2023-02-07 RX ADMIN — FENTANYL CITRATE 25 MCG: 50 INJECTION, SOLUTION INTRAMUSCULAR; INTRAVENOUS at 14:53

## 2023-02-07 RX ADMIN — MIDAZOLAM 2 MG: 1 INJECTION, SOLUTION INTRAMUSCULAR; INTRAVENOUS at 13:35

## 2023-02-07 RX ADMIN — PROPOFOL 40 MG: 10 INJECTION, EMULSION INTRAVENOUS at 14:34

## 2023-02-07 RX ADMIN — ACETAMINOPHEN 1000 MG: 500 TABLET, FILM COATED ORAL at 19:23

## 2023-02-07 RX ADMIN — SODIUM CHLORIDE 75 ML/HR: 9 INJECTION, SOLUTION INTRAVENOUS at 19:31

## 2023-02-07 RX ADMIN — LIDOCAINE HYDROCHLORIDE 70 MG: 20 INJECTION, SOLUTION EPIDURAL; INFILTRATION; INTRACAUDAL; PERINEURAL at 13:47

## 2023-02-07 RX ADMIN — ROCURONIUM BROMIDE 10 MG: 10 INJECTION INTRAVENOUS at 15:42

## 2023-02-07 RX ADMIN — FENTANYL CITRATE 50 MCG: 50 INJECTION, SOLUTION INTRAMUSCULAR; INTRAVENOUS at 14:34

## 2023-02-07 RX ADMIN — PROTAMINE SULFATE 20 MG: 10 INJECTION, SOLUTION INTRAVENOUS at 16:37

## 2023-02-07 RX ADMIN — ROCURONIUM BROMIDE 40 MG: 10 INJECTION INTRAVENOUS at 13:47

## 2023-02-07 RX ADMIN — PROPOFOL 100 MG: 10 INJECTION, EMULSION INTRAVENOUS at 13:47

## 2023-02-07 RX ADMIN — KETOROLAC TROMETHAMINE 15 MG: 15 INJECTION, SOLUTION INTRAMUSCULAR; INTRAVENOUS at 17:39

## 2023-02-07 RX ADMIN — DEXAMETHASONE SODIUM PHOSPHATE 4 MG: 4 INJECTION, SOLUTION INTRAMUSCULAR; INTRAVENOUS at 14:22

## 2023-02-07 RX ADMIN — NITROGLYCERIN 0.5 INCH: 20 OINTMENT TOPICAL at 17:39

## 2023-02-07 RX ADMIN — GLYCOPYRROLATE 0.6 MCG: 0.2 INJECTION, SOLUTION INTRAMUSCULAR; INTRAVITREAL at 17:04

## 2023-02-07 RX ADMIN — PHENYLEPHRINE HYDROCHLORIDE 0.5 MCG/KG/MIN: 10 INJECTION INTRAVENOUS at 15:45

## 2023-02-07 RX ADMIN — NITROGLYCERIN 0.5 INCH: 20 OINTMENT TOPICAL at 21:05

## 2023-02-07 RX ADMIN — FENTANYL CITRATE 25 MCG: 50 INJECTION, SOLUTION INTRAMUSCULAR; INTRAVENOUS at 13:47

## 2023-02-07 RX ADMIN — BUDESONIDE AND FORMOTEROL FUMARATE DIHYDRATE 2 PUFF: 160; 4.5 AEROSOL RESPIRATORY (INHALATION) at 22:19

## 2023-02-07 RX ADMIN — SODIUM CHLORIDE, SODIUM GLUCONATE, SODIUM ACETATE, POTASSIUM CHLORIDE AND MAGNESIUM CHLORIDE: 526; 502; 368; 37; 30 INJECTION, SOLUTION INTRAVENOUS at 15:13

## 2023-02-07 RX ADMIN — ROCURONIUM BROMIDE 10 MG: 10 INJECTION INTRAVENOUS at 15:31

## 2023-02-07 RX ADMIN — NEOSTIGMINE METHYLSULFATE 3 MG: 0.5 INJECTION INTRAVENOUS at 17:04

## 2023-02-07 RX ADMIN — FENTANYL CITRATE 50 MCG: 50 INJECTION, SOLUTION INTRAMUSCULAR; INTRAVENOUS at 17:06

## 2023-02-07 RX ADMIN — EPHEDRINE SULFATE 5 MG: 50 INJECTION INTRAVENOUS at 14:40

## 2023-02-07 RX ADMIN — SODIUM CHLORIDE 100 ML/HR: 9 INJECTION, SOLUTION INTRAVENOUS at 10:39

## 2023-02-07 RX ADMIN — SODIUM CHLORIDE, SODIUM GLUCONATE, SODIUM ACETATE, POTASSIUM CHLORIDE AND MAGNESIUM CHLORIDE: 526; 502; 368; 37; 30 INJECTION, SOLUTION INTRAVENOUS at 13:53

## 2023-02-07 RX ADMIN — ROCURONIUM BROMIDE 10 MG: 10 INJECTION INTRAVENOUS at 14:30

## 2023-02-07 RX ADMIN — ONDANSETRON 4 MG: 2 INJECTION INTRAMUSCULAR; INTRAVENOUS at 16:39

## 2023-02-07 RX ADMIN — HEPARIN SODIUM 8000 UNITS: 1000 INJECTION INTRAVENOUS; SUBCUTANEOUS at 15:12

## 2023-02-07 RX ADMIN — Medication 2 G: at 13:47

## 2023-02-07 RX ADMIN — ATORVASTATIN CALCIUM 40 MG: 40 TABLET, FILM COATED ORAL at 21:04

## 2023-02-07 RX ADMIN — ONDANSETRON 4 MG: 2 INJECTION INTRAMUSCULAR; INTRAVENOUS at 22:04

## 2023-02-07 RX ADMIN — FENTANYL CITRATE 50 MCG: 50 INJECTION, SOLUTION INTRAMUSCULAR; INTRAVENOUS at 15:53

## 2023-02-07 RX ADMIN — CEFAZOLIN 2 G: 10 INJECTION, POWDER, FOR SOLUTION INTRAVENOUS at 21:04

## 2023-02-07 RX ADMIN — ALBUTEROL SULFATE 2.5 MG: 2.5 SOLUTION RESPIRATORY (INHALATION) at 22:19

## 2023-02-07 NOTE — ANESTHESIA PROCEDURE NOTES
Peripheral IV    Patient location during procedure: OR  Start time: 2/7/2023 1:50 PM  End time: 2/7/2023 1:51 PM  Line placed for Fluids/Medication Admin.  Performed By   CRNA/KANA: Sahwn Fry CRNASRNA: Jeffrey Garcia SRNA  Preanesthetic Checklist  Completed: patient identified, IV checked, site marked, risks and benefits discussed, surgical consent, monitors and equipment checked, pre-op evaluation and timeout performed  Peripheral IV Prep   Patient position: supine   Prep: ChloraPrep  Patient monitoring: heart rate, cardiac monitor and continuous pulse ox  Peripheral IV Procedure   Laterality:right  Location:  Wrist  Catheter size: 18 G  Guidance: ultrasound guided          Post Assessment   Dressing Type: gauze, tape and transparent.    IV Dressing/Site: clean, dry and intact

## 2023-02-07 NOTE — ANESTHESIA PREPROCEDURE EVALUATION
Anesthesia Evaluation     Patient summary reviewed and Nursing notes reviewed   no history of anesthetic complications:  NPO Solid Status: > 8 hours  NPO Liquid Status: > 8 hours           Airway   Mallampati: I  TM distance: >3 FB  Neck ROM: full  No difficulty expected  Dental    (+) edentulous    Pulmonary     breath sounds clear to auscultation  (+) a smoker Former, COPD mild, shortness of breath, decreased breath sounds,   (-) asthma, rhonchi, wheezes, rales, pulmonary embolism, no home oxygen  Cardiovascular   Exercise tolerance: poor (<4 METS)    ECG reviewed  PT is on anticoagulation therapy  Rhythm: regular  Rate: normal    (+) hypertension, valvular problems/murmurs MR, past MI  >12 months, CAD, DO, PVD, hyperlipidemia,   (-) pacemaker, dysrhythmias, angina, CHF, murmur, JVD, peripheral edema, cardiac stents, CABG, DVT    ROS comment: EKG 1/31/2023:  Normal sinus rhythm  Possible Left atrial enlargement  RSR' pattern in V1  Cannot rule out Septal infarct , age undetermined  Abnormal ECG  When compared with ECG of 01-JAN-2020 08:54,  Minimal criteria for Septal infarct are now present    TTE 1/2/2020:  Overall normal LV systolic function with an ejection fraction of 50 to 55% with no wall motion abnormalities.  No LV mass or thrombus was noted.  Aortic root and proximal ascending aorta was normal in size.  Left atrium was normal in size.  Left atrial appendage was intact with no thrombus noted.  No smoke noted.  Right atrium was normal in size.  Interatrial septum was intact and bubble contrast study showed no evidence of PFO or ASD.  Mitral valve appeared structurally normal with trace to mild mitral regurgitation.  Tricuspid valve appeared structurally normal with trace tricuspid regurgitation.  Aortic valve was trileaflet and within normal limits.  Pulmonary valve appeared structurally normal.     Impression: No intracardiac mass or thrombus noted on the study.  Normal LV systolic function with an EF  "of 50 to 55% with no wall motion abnormalities.  No left atrial mass or thrombus noted.  Left atrial appendage intact.      Neuro/Psych  (+) TIA, CVA residual symptoms, numbness,    (-) seizures  GI/Hepatic/Renal/Endo    (+)  GERD poorly controlled,    (-)  obesity, morbid obesity, PUD    Musculoskeletal     Abdominal  - normal exam   Substance History - negative use     OB/GYN negative ob/gyn ROS         Other   arthritis,      ROS/Med Hx Other: 2003 RIGHT femoral tibial bypass (SVG)  2004 \"absorbable stents\"  [facility]  9/2022: Arterial duplex(BDM): Severe right lower extremity inflow disease. Severe right lower extremity peripheral arterial disease. Right posterior tibial artery occlusion.   1/2023 MJ:  RIGHT noncompressible, bi/monophasic.  LEFT .74 biphasic.     9/2022 Lower extremity venous duplex(BDM):  No dvt.  RIGHT GSV absent.     1/2020 ECG:  NSR 98, TQc 446  1/2020 Echocardiogram:  EF 45%, LA 32mm, LV 40mm.    Former smoker- quit 1/1/2012. Mild COPD uses albuterol inhaler twice a day. Symbicort BID as well. Denies ever being admitted for COPD exacerbation.     Hx of CVA x3: Problems with speech, memory & balance. Last stroke 9/2015    Multiple dilations for esophagus    CT angio aorta 9/9/2022:  IMPRESSION:  Occlusion of right external iliac artery proximally with  retrograde flow via collaterals distally  More than narrowing of the left external iliac artery, with  severe narrowing close to the ostium of the left internal iliac  artery.  Severe demonstrated narrowing of the right superficial femoral  artery proximally, with occlusion close to the adductor hiatus.  Occlusion of the right popliteal artery.  Moderate narrowing of the right tibioperoneal trunk with flow  supplied via collaterals and not from distal right popliteal  artery.  Moderate narrowing of the left superficial femoral artery and  left tibioperoneal trunk.    MI in 2003- nothing said in the hx on a cardiac stent              "     Anesthesia Plan    ASA 4     general and [place]     (Hgb=14.2, T&S  EF=50-55%  Discussed arterial line & another IV and patient understands possible complications, risks, & agrees.  )  intravenous induction     Anesthetic plan, risks, benefits, and alternatives have been provided, discussed and informed consent has been obtained with: patient.  Pre-procedure education provided  Use of blood products discussed with patient  Consented to blood products.   Plan discussed with CRNA.        CODE STATUS:

## 2023-02-07 NOTE — ANESTHESIA PROCEDURE NOTES
Airway  Date/Time: 2/7/2023 1:51 PM  End Time:2/7/2023 1:52 PM  Airway not difficult    General Information and Staff    Patient location during procedure: OR  CRNA/CAA: Shawn Fry CRNA  SRNA: Ly Johnson SRNA  Indications and Patient Condition  Indications for airway management: airway protection    Preoxygenated: yes  MILS maintained throughout  Mask difficulty assessment: 1 - vent by mask    Final Airway Details  Final airway type: endotracheal airway      Successful airway: ETT  Cuffed: yes   Successful intubation technique: direct laryngoscopy  Facilitating devices/methods: intubating stylet  Endotracheal tube insertion site: oral  Blade: Jocelyn  Blade size: 3  ETT size (mm): 7.5  Cormack-Lehane Classification: grade I - full view of glottis  Placement verified by: chest auscultation and capnometry   Cuff volume (mL): 10  Measured from: lips  ETT/EBT  to lips (cm): 24  Number of attempts at approach: 1  Assessment: lips, teeth, and gum same as pre-op and atraumatic intubation

## 2023-02-07 NOTE — OP NOTE
OPERATIVE NOTE  Aubrey Montilla  1955  2/7/2023    PREOP DIAGNOSES:  PVD (peripheral vascular disease) [I73.9]  PVD (peripheral vascular disease) (HCC) [I73.9]  Prior RIGHT femoral tibial bypass    POSTOP DIAGNOSES:  PVD (peripheral vascular disease) [I73.9]                     PROCEDURE:   FEMORAL FEMORAL BYPASS (6mm PTFE graft)  RIGHTcommon femoral endarterectomy  Reoperation RIGHT femoral anastamosis  Negative pressure wound therapy (>50cm2, Prevena)     SURGEON: MATEUS Vasquez MD FACS RPVI    ASSISTANT: Jarek Snyder [facility] provided critical assistance during the case including suctioning, exposure, retraction, and reduction of blood loss.    ANESTHESIA: General ET     ESTIMATED BLOOD LOSS: 100 ml     SPECIMEN:  None    COMPLICATIONS:  None    DESCRIPTION OF OPERATION: Patient was taken to the operating room and placed in supine position. General endotracheal anesthesia was induced. Radial arterial line was placed by Anesthesia. Patient prepped and draped in sterile fashion.    Oblique incision was then made in the LEFT groin, dissection carried down to the common femoral artery and its branches which were isolated. Redo oblique incision was then made in the RIGHT groin, dissection carried down to the common femoral artery and its branches which were isolated, prior RIGHT femoral tibial vein graft occluded. 6mm PTFE graft was tunneled, inverted C fashion subcutaneously between groin incisions using [place] tunneler. 8000 units heparin given to maintain ACT ~300. The LEFT end of the PTFE graft was fashioned for anastomosis. Profunda and SFA were isolated with profunda clamps, proximal common femoral artery was isolated with Z-Clamp, opened with 11 blade and there was mild obstructive plaque. End of the PTFE graft was fashioned and anastomosed end-to-side to the common femoral artery using CV 6 [place]-Harman suture. Usual de-airing techniques were employed. Good pulse distal to the anastomosis.The RIGHT end  of the PTFE graft was fashioned for anastomosis. Profunda and SFA were isolated with profunda clamps, proximal common femoral artery was isolated with Z-Clamp, opened with 11 blade and there was severe calcific obstructive plaque. End of the PTFE graft was fashioned and anastomosed end-to-side to the common femoral artery using CV 6 [place]-Har[place] suture.    Usual de-airing techniques were employed. Good pulse distal to the anastomosis. Good biphasic signal in the posterior tibial artery and biphasic signal in the dorsalis pedis artery. 20 mg protamine was given with return of ACT to baseline. Hemostasis was obtained. Pati was used. Incisions were closed in layers of 3-0 PDS, 3-0 PDS, 4-0 Monocryl in the skin.  Patient had a moderate tissue edema, bilateral negative pressure wound therapy was performed using the Prevena wound dressing([id number]). Patient tolerated procedure well and was transferred to PACU stable condition.               This document has been electronically signed by Gopi Vasquez MD on February 7, 2023 17:11 CST

## 2023-02-07 NOTE — ANESTHESIA PROCEDURE NOTES
Arterial Line    Pre-sedation assessment completed: 2/7/2023 1:45 PM    Patient reassessed immediately prior to procedure    Patient location during procedure: OR  Start time: 2/7/2023 1:57 PM  Stop Time:2/7/2023 1:58 PM       Line placed for hemodynamic monitoring and ABGs/Labs/ISTAT.  Performed By   CRNA/KANA: Shawn Fry CRNA SRNA: Jeffrey Garcia SRNA  Preanesthetic Checklist  Completed: patient identified, IV checked, site marked, risks and benefits discussed, surgical consent, monitors and equipment checked, pre-op evaluation and timeout performed  Arterial Line Prep    Sterile Tech: gloves, cap, mask and gown  Prep: ChloraPrep  Patient monitoring: blood pressure monitoring, continuous pulse oximetry and EKG  Arterial Line Procedure   Laterality:left  Location:  radial artery  Catheter size: 20 G   Guidance: ultrasound guided  PROCEDURE NOTE/ULTRASOUND INTERPRETATION.  Using ultrasound guidance the potential vascular sites for insertion of the catheter were visualized to determine the patency of the vessel to be used for vascular access.  After selecting the appropriate site for insertion, the needle was visualized under ultrasound being inserted into the radial artery, followed by ultrasound confirmation of wire and catheter placement. There were no abnormalities seen on ultrasound; an image was taken; and the patient tolerated the procedure with no complications.   Number of attempts: 1  Successful placement: yes   Post Assessment   Dressing Type: occlusive dressing applied, secured with tape and wrist guard applied.   Complications no  Circ/Move/Sens Assessment: normal.   Patient Tolerance: patient tolerated the procedure well with no apparent complications

## 2023-02-07 NOTE — ANESTHESIA POSTPROCEDURE EVALUATION
Patient: Aubrey Montilla    Procedure Summary     Date: 02/07/23 Room / Location: [facility] OR 05 / [facility] OR    Anesthesia Start: 1337 Anesthesia Stop: 1737    Procedure: FEMORAL FEMORAL BYPASS                 (C-ARM#2 AND C-ARM TABLE )         (VA TRANSPORATION (Groin) Diagnosis:       PVD (peripheral vascular disease) (HCC)      (PVD (peripheral vascular disease) (HCC) [I73.9])    Surgeons: Gopi Vasquez MD Provider: Shawn Fry CRNA    Anesthesia Type: general, [place] ASA Status: 4          Anesthesia Type: general, [place]    Vitals  No vitals data found for the desired time range.          Post Anesthesia Care and Evaluation    Patient location during evaluation: PACU  Patient participation: waiting for patient participation  Level of consciousness: sleepy but conscious  Pain score: 0  Pain management: adequate    Airway patency: patent  Anesthetic complications: No anesthetic complications  PONV Status: none  Cardiovascular status: hemodynamically stable and acceptable  Respiratory status: acceptable, spontaneous ventilation and room air  Hydration status: acceptable    Comments: HR 96  /56  RR 16  SPO2 100%  Temp 97.8

## 2023-02-08 LAB
ANION GAP SERPL CALCULATED.3IONS-SCNC: 13 MMOL/L (ref 5–15)
BASOPHILS # BLD AUTO: 0.01 10*3/MM3 (ref 0–0.2)
BASOPHILS NFR BLD AUTO: 0.1 % (ref 0–1.5)
BUN SERPL-MCNC: 13 MG/DL (ref 8–23)
BUN/CREAT SERPL: 12.4 (ref 7–25)
CALCIUM SPEC-SCNC: 7.8 MG/DL (ref 8.6–10.5)
CHLORIDE SERPL-SCNC: 104 MMOL/L (ref 98–107)
CO2 SERPL-SCNC: 20 MMOL/L (ref 22–29)
CREAT SERPL-MCNC: 1.05 MG/DL (ref 0.76–1.27)
DEPRECATED RDW RBC AUTO: 42.8 FL (ref 37–54)
EGFRCR SERPLBLD CKD-EPI 2021: 77.8 ML/MIN/1.73
EOSINOPHIL # BLD AUTO: 0.01 10*3/MM3 (ref 0–0.4)
EOSINOPHIL NFR BLD AUTO: 0.1 % (ref 0.3–6.2)
ERYTHROCYTE [DISTWIDTH] IN BLOOD BY AUTOMATED COUNT: 12.8 % (ref 12.3–15.4)
GLUCOSE SERPL-MCNC: 130 MG/DL (ref 65–99)
HCT VFR BLD AUTO: 29.3 % (ref 37.5–51)
HGB BLD-MCNC: 9.6 G/DL (ref 13–17.7)
IMM GRANULOCYTES # BLD AUTO: 0.07 10*3/MM3 (ref 0–0.05)
IMM GRANULOCYTES NFR BLD AUTO: 0.5 % (ref 0–0.5)
LYMPHOCYTES # BLD AUTO: 0.91 10*3/MM3 (ref 0.7–3.1)
LYMPHOCYTES NFR BLD AUTO: 6.6 % (ref 19.6–45.3)
MCH RBC QN AUTO: 29.5 PG (ref 26.6–33)
MCHC RBC AUTO-ENTMCNC: 32.8 G/DL (ref 31.5–35.7)
MCV RBC AUTO: 90.2 FL (ref 79–97)
MONOCYTES # BLD AUTO: 0.99 10*3/MM3 (ref 0.1–0.9)
MONOCYTES NFR BLD AUTO: 7.2 % (ref 5–12)
NEUTROPHILS NFR BLD AUTO: 11.85 10*3/MM3 (ref 1.7–7)
NEUTROPHILS NFR BLD AUTO: 85.5 % (ref 42.7–76)
NRBC BLD AUTO-RTO: 0 /100 WBC (ref 0–0.2)
PLATELET # BLD AUTO: 204 10*3/MM3 (ref 140–450)
PMV BLD AUTO: 11 FL (ref 6–12)
POTASSIUM SERPL-SCNC: 4.5 MMOL/L (ref 3.5–5.2)
RBC # BLD AUTO: 3.25 10*6/MM3 (ref 4.14–5.8)
SODIUM SERPL-SCNC: 137 MMOL/L (ref 136–145)
WBC NRBC COR # BLD: 13.84 10*3/MM3 (ref 3.4–10.8)

## 2023-02-08 PROCEDURE — 80048 BASIC METABOLIC PNL TOTAL CA: CPT | Performed by: THORACIC SURGERY (CARDIOTHORACIC VASCULAR SURGERY)

## 2023-02-08 PROCEDURE — 25010000002 CEFAZOLIN PER 500 MG: Performed by: THORACIC SURGERY (CARDIOTHORACIC VASCULAR SURGERY)

## 2023-02-08 PROCEDURE — 25010000002 ONDANSETRON PER 1 MG: Performed by: THORACIC SURGERY (CARDIOTHORACIC VASCULAR SURGERY)

## 2023-02-08 PROCEDURE — 99024 POSTOP FOLLOW-UP VISIT: CPT | Performed by: NURSE PRACTITIONER

## 2023-02-08 PROCEDURE — 85025 COMPLETE CBC W/AUTO DIFF WBC: CPT | Performed by: THORACIC SURGERY (CARDIOTHORACIC VASCULAR SURGERY)

## 2023-02-08 PROCEDURE — 94761 N-INVAS EAR/PLS OXIMETRY MLT: CPT

## 2023-02-08 PROCEDURE — 97162 PT EVAL MOD COMPLEX 30 MIN: CPT

## 2023-02-08 PROCEDURE — P9041 ALBUMIN (HUMAN),5%, 50ML: HCPCS | Performed by: THORACIC SURGERY (CARDIOTHORACIC VASCULAR SURGERY)

## 2023-02-08 PROCEDURE — 94799 UNLISTED PULMONARY SVC/PX: CPT

## 2023-02-08 PROCEDURE — 25010000002 ALBUMIN HUMAN 5% PER 50 ML: Performed by: THORACIC SURGERY (CARDIOTHORACIC VASCULAR SURGERY)

## 2023-02-08 PROCEDURE — 25010000002 PHENYLEPHRINE 10 MG/ML SOLUTION: Performed by: THORACIC SURGERY (CARDIOTHORACIC VASCULAR SURGERY)

## 2023-02-08 RX ORDER — ASPIRIN 81 MG/1
81 TABLET, CHEWABLE ORAL DAILY
Status: DISCONTINUED | OUTPATIENT
Start: 2023-02-08 | End: 2023-02-09 | Stop reason: HOSPADM

## 2023-02-08 RX ORDER — OXYCODONE HYDROCHLORIDE AND ACETAMINOPHEN 5; 325 MG/1; MG/1
1 TABLET ORAL EVERY 4 HOURS PRN
Status: DISCONTINUED | OUTPATIENT
Start: 2023-02-08 | End: 2023-02-09 | Stop reason: HOSPADM

## 2023-02-08 RX ORDER — PHENYLEPHRINE HCL IN 0.9% NACL 0.5 MG/5ML
.5-3 SYRINGE (ML) INTRAVENOUS
Status: DISCONTINUED | OUTPATIENT
Start: 2023-02-08 | End: 2023-02-08

## 2023-02-08 RX ORDER — ALBUMIN, HUMAN INJ 5% 5 %
500 SOLUTION INTRAVENOUS ONCE
Status: COMPLETED | OUTPATIENT
Start: 2023-02-08 | End: 2023-02-08

## 2023-02-08 RX ORDER — HYDROMORPHONE HCL IN 0.9% NACL 0.2 MG/ML
PLASTIC BAG, INJECTION (ML) INTRAVENOUS CONTINUOUS
Status: DISCONTINUED | OUTPATIENT
Start: 2023-02-08 | End: 2023-02-08

## 2023-02-08 RX ADMIN — NITROGLYCERIN 0.5 INCH: 20 OINTMENT TOPICAL at 08:22

## 2023-02-08 RX ADMIN — PHENYLEPHRINE HYDROCHLORIDE 0.5 MCG/KG/MIN: 50 INJECTION INTRAVENOUS at 04:27

## 2023-02-08 RX ADMIN — CEFAZOLIN 2 G: 10 INJECTION, POWDER, FOR SOLUTION INTRAVENOUS at 05:01

## 2023-02-08 RX ADMIN — ALBUMIN (HUMAN) 500 ML: 12.5 INJECTION, SOLUTION INTRAVENOUS at 02:40

## 2023-02-08 RX ADMIN — BUDESONIDE AND FORMOTEROL FUMARATE DIHYDRATE 2 PUFF: 160; 4.5 AEROSOL RESPIRATORY (INHALATION) at 07:24

## 2023-02-08 RX ADMIN — ALBUTEROL SULFATE 2.5 MG: 2.5 SOLUTION RESPIRATORY (INHALATION) at 07:24

## 2023-02-08 RX ADMIN — NITROGLYCERIN 0.5 INCH: 20 OINTMENT TOPICAL at 04:36

## 2023-02-08 RX ADMIN — ACETAMINOPHEN 1000 MG: 500 TABLET, FILM COATED ORAL at 11:03

## 2023-02-08 RX ADMIN — ALBUTEROL SULFATE 2.5 MG: 2.5 SOLUTION RESPIRATORY (INHALATION) at 14:54

## 2023-02-08 RX ADMIN — ACETAMINOPHEN 1000 MG: 500 TABLET, FILM COATED ORAL at 05:01

## 2023-02-08 RX ADMIN — NITROGLYCERIN 0.5 INCH: 20 OINTMENT TOPICAL at 20:46

## 2023-02-08 RX ADMIN — OXYCODONE HYDROCHLORIDE AND ACETAMINOPHEN 1 TABLET: 5; 325 TABLET ORAL at 08:22

## 2023-02-08 RX ADMIN — ASPIRIN 81 MG: 81 TABLET, CHEWABLE ORAL at 11:01

## 2023-02-08 RX ADMIN — ACETAMINOPHEN 1000 MG: 500 TABLET, FILM COATED ORAL at 00:53

## 2023-02-08 RX ADMIN — NITROGLYCERIN 0.5 INCH: 20 OINTMENT TOPICAL at 18:20

## 2023-02-08 RX ADMIN — SODIUM CHLORIDE 100 ML/HR: 9 INJECTION, SOLUTION INTRAVENOUS at 21:59

## 2023-02-08 RX ADMIN — ACETAMINOPHEN 1000 MG: 500 TABLET, FILM COATED ORAL at 18:20

## 2023-02-08 RX ADMIN — ALBUMIN (HUMAN) 500 ML: 12.5 INJECTION, SOLUTION INTRAVENOUS at 00:52

## 2023-02-08 RX ADMIN — ATORVASTATIN CALCIUM 40 MG: 40 TABLET, FILM COATED ORAL at 20:45

## 2023-02-08 RX ADMIN — CLOPIDOGREL BISULFATE 75 MG: 75 TABLET ORAL at 08:22

## 2023-02-08 RX ADMIN — ONDANSETRON 4 MG: 2 INJECTION INTRAMUSCULAR; INTRAVENOUS at 06:32

## 2023-02-08 NOTE — PLAN OF CARE
Goal Outcome Evaluation:  Plan of Care Reviewed With: caregiver, patient        Progress: no change  Outcome Evaluation: Nutrition New Assessment:  pt is post op surgery for PVD.  He denies any current eating difficulties but often has to have his esophagus stretched.  Good appetite.  No recent wt loss. BMI 20.  Will add supplements to opimize nurition and monitor.   to see to discuss food sources that are available if needed.

## 2023-02-08 NOTE — PROGRESS NOTES
"  Cardiothoracic - Vascular Surgery Daily Note        LOS: 1 day   Patient Care Team:  Lorenzo Mondragon MD (Inactive) as PCP - General    POD1  FEMORAL FEMORAL BYPASS (6mm PTFE graft)  RIGHTcommon femoral endarterectomy  Reoperation RIGHT femoral anastamosis    Chief Complaint: Post surgical pain       Subjective     The following portions of the patient's history were reviewed and updated as appropriate: allergies, current medications, past family history, past medical history, past social history, past surgical history and problem list.     Subjective:  Symptoms:  Stable.  No shortness of breath.    Diet:  Poor intake.  He reports  nausea.    Activity level: Impaired due to pain.    Pain:  He complains of pain that is moderate.  He reports pain is improving.  Pain is partially controlled and requiring pain medication.          Objective     Vital Signs  Temp:  [96 °F (35.6 °C)-97.8 °F (36.6 °C)] 97.5 °F (36.4 °C)  Heart Rate:  [] 109  Resp:  [16-20] 18  BP: ()/(44-92) 109/92  Arterial Line BP: ()/(35-59) 93/42  Body mass index is 19.95 kg/m².    Intake/Output Summary (Last 24 hours) at 2/8/2023 0927  Last data filed at 2/8/2023 0500  Gross per 24 hour   Intake 3140 ml   Output 495 ml   Net 2645 ml     No intake/output data recorded.    Wt Readings from Last 3 Encounters:   02/08/23 68.6 kg (151 lb 3.2 oz)   01/31/23 67.1 kg (148 lb)   01/16/23 64.4 kg (142 lb)         Physical Exam   Objective:  General Appearance:  Comfortable, well-appearing, in no acute distress and in pain.    Vital signs: (most recent): Blood pressure 109/92, pulse 109, temperature 97.5 °F (36.4 °C), temperature source Oral, resp. rate 18, height 185.4 cm (73\"), weight 68.6 kg (151 lb 3.2 oz), SpO2 96 %.  Vital signs are normal.    Output: Producing urine and no stool output.    HEENT: Normal HEENT exam.    Lungs:  Normal effort and normal respiratory rate.    Heart: Tachycardia.  Regular rhythm.    Chest: No chest wall " tenderness.    Abdomen: Abdomen is soft.  Hypoactive bowel sounds.   There is no abdominal tenderness.     Extremities: Decreased range of motion.  There is dependent edema.    Pulses: Distal pulses are intact.    Neurological: Patient is alert and oriented to person, place and time.  GCS score is 15.    Pupils:  Pupils are equal, round, and reactive to light.    Skin:  Warm and dry.  (Bilateral groin incision with prevena. )              Results Review:    Lab Results   Component Value Date    WBC 13.84 (H) 02/08/2023    HGB 9.6 (L) 02/08/2023    HCT 29.3 (L) 02/08/2023    MCV 90.2 02/08/2023     02/08/2023     Lab Results   Component Value Date    GLUCOSE 130 (H) 02/08/2023    BUN 13 02/08/2023    CREATININE 1.05 02/08/2023    EGFR 77.8 02/08/2023    BCR 12.4 02/08/2023    K 4.5 02/08/2023    CO2 20.0 (L) 02/08/2023    CALCIUM 7.8 (L) 02/08/2023    ALBUMIN 3.70 09/09/2022    AST 14 09/09/2022    ALT 6 09/09/2022       Calcium Calcium   Date/Time Value Ref Range Status   02/08/2023 0546 7.8 (L) 8.6 - 10.5 mg/dL Final      Magnesium No results found for: MG     Imaging Results (Last 24 Hours)     ** No results found for the last 24 hours. **                              acetaminophen, 1,000 mg, Oral, Q6H  albuterol, 2.5 mg, Nebulization, Q8H - RT  aspirin, 81 mg, Oral, Daily  atorvastatin, 40 mg, Oral, Nightly  budesonide-formoterol, 2 puff, Inhalation, BID - RT  clopidogrel, 75 mg, Oral, Daily  nitroglycerin, 0.5 inch, Topical, Q12H  nitroglycerin, 0.5 inch, Topical, Q12H      sodium chloride, 100 mL/hr, Last Rate: 75 mL/hr (02/07/23 1931)              ASSESSMENT/PLAN     Satisfactory Post op  Progressing well, up in chair, well appearing, tolerating PO.  Off myla.     PVD  Status post revascularization, Mild reperfusion edema  Long term DAPT, Statin    biphasic dopplered signals on exam    MJ and graft duplex in 6 weeks post op     Acute blood loss anemia  No blood products intra-op or post op.  Remains  asymptomatic.  HH and hemodynamics acceptable     Post operative pain  Controlled oxycodone/acetaminophen     Rehab  Up ad sirena, ambulate TID in hallways with nursing assistance     Respiratory  NEBS PRN, wean o2 as tolerated, IS/OPEP     Disposition  Stable for transfer to telemetry, home tomorrow if all well.        This document has been electronically signed by BETHANY Driver-BC @  On February 8, 2023 09:27 CST

## 2023-02-08 NOTE — CONSULTS
Adult Nutrition  Assessment/PES    Patient Name:  Aubrey Montilla  YOB: 1955  MRN: [id number]  Admit Date:  2/7/2023    Assessment Date:  2/8/2023    Comments:  Nutrition:  Pt seen for nutrition screening reporting difficulty chewing/swallowing.  Pt is edentulous and reports that he intermittently has to have his esophagus stretched--but at this time he denies any eating difficulties.  Does not need diet modification.  Pt claims that he was eating well pta and his wt has been stable.  He did meet criteria for Moderate malnutrition in Sept 2022 but at this time his wt appears stable and there is only mild signs of fat loss and muscle wasting.  Pt did voice some concerns about always have the ability to get food due to finances.  RD spoke with staff and  is to discuss resources that are available.      He is POD#1 Femoral Femoral bypass for PVD.  Suspect he will be discharged soon.    Will add boost to all trays to optimize po and protein intake. BMI is 20.      Nutrient dense foods and beverages encouraged at discharge along with supplements to promote good Nutrition and overall good health.  Discussed alternative forms of protein that are more economic.      Will monitor POC and po intake.       Reason for Assessment     Row Name 02/08/23 1103          Reason for Assessment    Reason For Assessment identified at risk by screening criteria     Diagnosis cardiac disease;surgery/postoperative complications     Identified At Risk by Screening Criteria difficulty chewing/swallowing                Nutrition/Diet History     Row Name 02/08/23 1103          Nutrition/Diet History    Typical Intake (Food/Fluid/EN/PN) Pt reports that he was eating well pta but sometimes they have difficutly obtaining food due to finances.  He is aware of some of the food pantries in the area.  He states that he does have to have his esophagus stretched but at this time it is doing ok.  HE denies the need for  "diet modifications.  He is hoping to go home today.                Labs/Tests/Procedures/Meds     Row Name 02/08/23 1106          Labs/Procedures/Meds    Lab Results Reviewed reviewed, pertinent     Lab Results Comments gluc 130        Diagnostic Tests/Procedures    Diagnostic Test/Procedure Reviewed reviewed, pertinent     Diagnostic Test/Procedures Comments S/p surgery for PVD        Medications    Pertinent Medications Reviewed reviewed, pertinent     Pertinent Medications Comments NaCl 100cc./hr                  Estimated/Assessed Needs - Anthropometrics     Row Name 02/08/23 1108 02/08/23 0500       Anthropometrics    Weight -- 68.6 kg (151 lb 3.2 oz)    Height for Calculation 1.854 m (6' 1\") --    Weight for Calculation 83.5 kg (184 lb) --       Estimated/Assessed Needs    Additional Documentation Additional Requirements (Group);Modified [place] State Equation (Group);Fluid Requirements (Group);Estimated Calorie Needs (Group);KCAL/KG (Group);[place]-St. Jeor Equation (Group);Protein Requirements (Group) --       Estimated Calorie Needs    Estimated Calorie Need Method kcal/kg --       KCAL/KG    KCAL/KG 25 Kcal/Kg (kcal) --    25 Kcal/Kg (kcal) 2086.55 --       [place]-St. Jeor Equation    RMR ([place]-St. Jeor Equation) 1663.495 --       Protein Requirements    Weight Used For Protein Calculations 83.5 kg (184 lb) --    Est Protein Requirement Amount (gms/kg) 1.0 gm protein --    Estimated Protein Requirements (gms/day) 83.46 --       Fluid Requirements    Fluid Requirements (mL/day) 2100 --    Estimated Fluid Requirement Method RDA Method --    RDA Method (mL) 2100 --               Nutrition Prescription Ordered     Row Name 02/08/23 1109          Nutrition Prescription PO    Current PO Diet Regular     Common Modifiers Cardiac;Consistent Carbohydrate                Evaluation of Received Nutrient/Fluid Intake     Row Name 02/08/23 1109          PO Evaluation    Number of Days PO Intake Evaluated Insufficient " Data                   Problem/Interventions:   Problem 1     Row Name 02/08/23 1110          Nutrition Diagnoses Problem 1    Problem 1 Increased Nutrient Needs     Macronutrient Protein;Kcal     Etiology (related to) Medical Diagnosis     Cardiac PVD     Signs/Symptoms (evidenced by) Other (comment)     Other Comment Hypermetabolic state                      Intervention Goal     Row Name 02/08/23 1110          Intervention Goal    General Meet nutritional needs for age/condition;Reduce/improve symptoms     PO Tolerate PO;Meet estimated needs     Weight Maintain weight                Nutrition Intervention     Row Name 02/08/23 1110          Nutrition Intervention    RD/Tech Action Follow Tx progress;Care plan reviewd;Encourage intake;Recommend/ordered;Advise alternate selection;Advise available snack;Interview for preference;Menu provided     Recommended/Ordered Supplement                Nutrition Prescription     Row Name 02/08/23 1111          Nutrition Prescription PO    PO Prescription Begin/change supplement     Supplement Boost Plus     Supplement Frequency 3 times a day     New PO Prescription Ordered? Yes                Education/Evaluation     Row Name 02/08/23 1111          Education    Education Education topics;Advised regarding habits/behavior     Education Topics Basic nutrition;Protein     Advised Regarding Habits/Behavior Increased nutrient density;Snacks;Use supplement;Food choices        Monitor/Evaluation    Monitor Per protocol;I&O;PO intake;Supplement intake;Skin status;Symptoms;Weight     Education Follow-up Reinforce PRN                 Electronically signed by:  Lana Sanches RD  02/08/23 11:13 CST

## 2023-02-08 NOTE — PLAN OF CARE
Goal Outcome Evaluation:  Plan of Care Reviewed With: patient           Outcome Evaluation: PT Enrrique completed this date. Patient is A&Ox4 w/cueing and agreeable to evaluation. Reports mild pain with movement in hips where procedures were done. LE strength is diminished and patient requires CGA for STS and ambulation using FWW. No LOB but patient requires cueing for managing AD, maintaining safe distance to AD and for sequencing. Patient would benefit from skilled PT during hospital stay to addres strength, balance, endurance and mobility deficits. Recommending home w/HHPT.
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
## 2023-02-08 NOTE — PLAN OF CARE
Goal Outcome Evaluation:   Pts blood pressure dropped during the shift. Pt received 1L total albumin. Pulse in right foot lost earlier in shift, but later returned. However, BP remains decreased, UOP dropped off to 10-20 per hour. Received orders to decrease basal dilaudid and keep demand. Also received orders to start myla for systolic less than 100. Grecia and marco antonio remain in place. Labs WNL. Pt A& O with no complaints this am. Will monitor.

## 2023-02-09 ENCOUNTER — HOME HEALTH ADMISSION (OUTPATIENT)
Dept: HOME HEALTH SERVICES | Facility: HOME HEALTHCARE | Age: 68
End: 2023-02-09
Payer: OTHER GOVERNMENT

## 2023-02-09 ENCOUNTER — READMISSION MANAGEMENT (OUTPATIENT)
Dept: CALL CENTER | Facility: HOSPITAL | Age: 68
End: 2023-02-09
Payer: OTHER GOVERNMENT

## 2023-02-09 VITALS
HEIGHT: 73 IN | BODY MASS INDEX: 20.17 KG/M2 | HEART RATE: 85 BPM | OXYGEN SATURATION: 95 % | SYSTOLIC BLOOD PRESSURE: 126 MMHG | DIASTOLIC BLOOD PRESSURE: 62 MMHG | WEIGHT: 152.2 LBS | RESPIRATION RATE: 18 BRPM | TEMPERATURE: 95 F

## 2023-02-09 PROCEDURE — 94799 UNLISTED PULMONARY SVC/PX: CPT

## 2023-02-09 PROCEDURE — 94760 N-INVAS EAR/PLS OXIMETRY 1: CPT

## 2023-02-09 PROCEDURE — 94664 DEMO&/EVAL PT USE INHALER: CPT

## 2023-02-09 PROCEDURE — 99024 POSTOP FOLLOW-UP VISIT: CPT | Performed by: NURSE PRACTITIONER

## 2023-02-09 RX ORDER — ASPIRIN 81 MG/1
81 TABLET, CHEWABLE ORAL DAILY
Qty: 30 TABLET | Refills: 11 | Status: SHIPPED | OUTPATIENT
Start: 2023-02-10

## 2023-02-09 RX ORDER — ATORVASTATIN CALCIUM 40 MG/1
40 TABLET, FILM COATED ORAL NIGHTLY
Qty: 90 TABLET | Refills: 3 | Status: SHIPPED | OUTPATIENT
Start: 2023-02-09

## 2023-02-09 RX ORDER — HYDROCODONE BITARTRATE AND ACETAMINOPHEN 5; 325 MG/1; MG/1
1 TABLET ORAL EVERY 6 HOURS PRN
Qty: 28 TABLET | Refills: 0 | Status: SHIPPED | OUTPATIENT
Start: 2023-02-09 | End: 2023-03-17

## 2023-02-09 RX ADMIN — SODIUM CHLORIDE 100 ML/HR: 9 INJECTION, SOLUTION INTRAVENOUS at 09:01

## 2023-02-09 RX ADMIN — ASPIRIN 81 MG: 81 TABLET, CHEWABLE ORAL at 08:48

## 2023-02-09 RX ADMIN — NITROGLYCERIN 0.5 INCH: 20 OINTMENT TOPICAL at 05:20

## 2023-02-09 RX ADMIN — CLOPIDOGREL BISULFATE 75 MG: 75 TABLET ORAL at 08:48

## 2023-02-09 RX ADMIN — NITROGLYCERIN 0.5 INCH: 20 OINTMENT TOPICAL at 08:48

## 2023-02-09 RX ADMIN — ACETAMINOPHEN 1000 MG: 500 TABLET, FILM COATED ORAL at 05:20

## 2023-02-09 RX ADMIN — ALBUTEROL SULFATE 2.5 MG: 2.5 SOLUTION RESPIRATORY (INHALATION) at 00:16

## 2023-02-09 RX ADMIN — ALBUTEROL SULFATE 2.5 MG: 2.5 SOLUTION RESPIRATORY (INHALATION) at 07:18

## 2023-02-09 NOTE — PLAN OF CARE
Goal Outcome Evaluation:  Plan of Care Reviewed With: patient        Progress: improving   VSS. Pt is very pleasant, and rested some through the night. Currently not complaining of any pain. Left leg is cool, but no tingling or numbness present. Pulses can be heard with doppler at 2+. Right leg is warm with no tingling or numbness, and pulses can also be heard with doppler at 2+. No complaints at this time. Will cont with pt care.

## 2023-02-09 NOTE — PROGRESS NOTES
"  Cardiothoracic - Vascular Surgery Daily Note        LOS: 2 days   Patient Care Team:  Lorenzo Mondragon MD (Inactive) as PCP - General    POD2  FEMORAL FEMORAL BYPASS (6mm PTFE graft)  RIGHTcommon femoral endarterectomy  Reoperation RIGHT femoral anastamosis    Chief Complaint: Post surgical pain       Subjective     The following portions of the patient's history were reviewed and updated as appropriate: allergies, current medications, past family history, past medical history, past social history, past surgical history and problem list.     Subjective:  Symptoms:  Stable.  No shortness of breath.    Diet:  Adequate intake.  No nausea.    Activity level: Impaired due to pain.    Pain:  He complains of pain that is moderate.  He reports pain is improving.  Pain is partially controlled and requiring pain medication.          Objective     Vital Signs  Temp:  [95 °F (35 °C)-98.9 °F (37.2 °C)] 95 °F (35 °C)  Heart Rate:  [] 85  Resp:  [18-20] 18  BP: (102-155)/(50-66) 126/62  Body mass index is 20.08 kg/m².    Intake/Output Summary (Last 24 hours) at 2/9/2023 0926  Last data filed at 2/9/2023 0657  Gross per 24 hour   Intake 240 ml   Output 1775 ml   Net -1535 ml     No intake/output data recorded.    Wt Readings from Last 3 Encounters:   02/09/23 69 kg (152 lb 3.2 oz)   01/31/23 67.1 kg (148 lb)   01/16/23 64.4 kg (142 lb)         Physical Exam   Objective:  General Appearance:  Comfortable, well-appearing, in no acute distress and in pain.    Vital signs: (most recent): Blood pressure 126/62, pulse 85, temperature 95 °F (35 °C), temperature source Temporal, resp. rate 18, height 185.4 cm (72.99\"), weight 69 kg (152 lb 3.2 oz), SpO2 95 %.  Vital signs are normal.    Output: Producing urine and no stool output.    HEENT: Normal HEENT exam.    Lungs:  Normal effort and normal respiratory rate.    Heart: Normal rate.  Regular rhythm.    Chest: No chest wall tenderness.    Abdomen: Abdomen is soft.  Hypoactive " bowel sounds.   There is no abdominal tenderness.     Extremities: Decreased range of motion.  There is dependent edema.    Pulses: Distal pulses are intact.    Neurological: Patient is alert and oriented to person, place and time.  GCS score is 15.    Pupils:  Pupils are equal, round, and reactive to light.    Skin:  Warm and dry.  (Bilateral groin incision with prevena. )              Results Review:    Lab Results   Component Value Date    WBC 13.84 (H) 02/08/2023    HGB 9.6 (L) 02/08/2023    HCT 29.3 (L) 02/08/2023    MCV 90.2 02/08/2023     02/08/2023     Lab Results   Component Value Date    GLUCOSE 130 (H) 02/08/2023    BUN 13 02/08/2023    CREATININE 1.05 02/08/2023    EGFR 77.8 02/08/2023    BCR 12.4 02/08/2023    K 4.5 02/08/2023    CO2 20.0 (L) 02/08/2023    CALCIUM 7.8 (L) 02/08/2023    ALBUMIN 3.70 09/09/2022    AST 14 09/09/2022    ALT 6 09/09/2022       Calcium Calcium   Date/Time Value Ref Range Status   02/08/2023 0546 7.8 (L) 8.6 - 10.5 mg/dL Final      Magnesium No results found for: MG     Imaging Results (Last 24 Hours)     ** No results found for the last 24 hours. **                              acetaminophen, 1,000 mg, Oral, Q6H  albuterol, 2.5 mg, Nebulization, Q8H - RT  aspirin, 81 mg, Oral, Daily  atorvastatin, 40 mg, Oral, Nightly  budesonide-formoterol, 2 puff, Inhalation, BID - RT  clopidogrel, 75 mg, Oral, Daily  nitroglycerin, 0.5 inch, Topical, Q12H  nitroglycerin, 0.5 inch, Topical, Q12H      sodium chloride, 100 mL/hr, Last Rate: 100 mL/hr (02/09/23 0901)              ASSESSMENT/PLAN     Satisfactory Post op  Progressing well, up in chair, well appearing, tolerating PO.       PVD  Status post revascularization, Mild reperfusion edema  Long term DAPT, Statin    biphasic dopplered signals on exam    MJ and graft duplex in 6 weeks post op     Acute blood loss anemia  No blood products intra-op or post op.  Remains asymptomatic.  HH and hemodynamics acceptable     Post  operative pain  Controlled oxycodone/acetaminophen     Rehab  Up ad sirena, ambulate TID in hallways with nursing assistance     Respiratory  NEBS PRN, wean o2 as tolerated, IS/OPEP     Disposition  Stable for D/C home one week follow up ctvs aprn         This document has been electronically signed by BETHANY Driver-BC @  On February 9, 2023 09:26 CST

## 2023-02-09 NOTE — DISCHARGE SUMMARY
CVTS DISCHARGE SUMMARY  Date of Admission: 2/7/2023 10:06 AM  Date of Discharge:  2/9/2023    Admission Diagnosis:   PVD (peripheral vascular disease) ([facility]) [I73.9]    Discharge Diagnosis:   Post-Op Diagnosis Codes:     * PVD (peripheral vascular disease) ([facility]) [I73.9]  Active Hospital Problems    Diagnosis  POA   • PVD (peripheral vascular disease) ([facility]) [I73.9]  Yes   • PVD (peripheral vascular disease) with claudication ([facility]) [I73.9]  Yes      Resolved Hospital Problems    Diagnosis Date Resolved POA   • **PVD (peripheral vascular disease) ([facility]) [I73.9] 01/17/2023 Unknown       Consults:   Consults     No orders found from 1/9/2023 to 2/8/2023.          Procedures Performed  Procedure(s):  FEMORAL FEMORAL BYPASS                 (C-ARM#2 AND C-ARM TABLE )         ([facility] TRANSPORATION       Discharge Medications     Discharge Medications      New Medications      Instructions Start Date   aspirin 81 MG chewable tablet   81 mg, Oral, Daily   Start Date: February 10, 2023     atorvastatin 40 MG tablet  Commonly known as: LIPITOR   40 mg, Oral, Nightly      HYDROcodone-acetaminophen 5-325 MG per tablet  Commonly known as: Norco   1 tablet, Oral, Every 6 Hours PRN         Continue These Medications      Instructions Start Date   albuterol sulfate  (90 Base) MCG/ACT inhaler  Commonly known as: PROVENTIL HFA;VENTOLIN HFA;PROAIR HFA   2 puffs, Inhalation, Every 6 Hours PRN      budesonide-formoterol 160-4.5 MCG/ACT inhaler  Commonly known as: SYMBICORT   2 puffs, Inhalation, 2 Times Daily - RT      carboxymethylcellulose 0.5 % solution  Commonly known as: REFRESH PLUS   1 drop, Both Eyes, 4 Times Daily PRN      clopidogrel 75 MG tablet  Commonly known as: PLAVIX   75 mg, Oral, Daily      cyclobenzaprine 5 MG tablet  Commonly known as: FLEXERIL   5 mg, Oral, 3 Times Daily PRN      docusate sodium 100 MG capsule  Commonly known as: COLACE   100 mg, Oral, 2 Times Daily PRN           Lobo MACARIO  Reviewed risks,  "benefits, and habit forming potential and weaning from narcotic medication. Patient understands and wishes to receive prescription.  Prescription written for norco for post-surgical pain after Hank placed on file.    Discharge Diet:   Diet Instructions     Diet: Regular, Consistent Carbohydrate, Cardiac      Discharge Diet:  Regular  Consistent Carbohydrate  Cardiac             Discharge Disposition: home in stable condition one week follow up ctvs aprn     History of Present Illness/Hospital Course:   HPI:       PCP:  Dr Jiménez  Cardiology:  Dr Naik ([facility])     67 y.o. male with HTN(stable, increased risk stroke, rupture), Hyperlipidemia(stable, increased risk cardiovascular events) and COPD(stable, increased risk pulmonary complications)  CAD. CVA. PVD (chronic severe progression, increase risk cardiovascular events)  former smoker. Failed RIGHT Fem-Tib (2003). [facility]. Most recent evaluation Dr. Aviles ([facility]) [facility]. RLE pain x 1mo, worsening claudication. No TIA, stroke, or amaurosis.  No MI or claudication. No other associated signs, symptoms or modifying factors.     2003 RIGHT femoral tibial bypass (SVG)  2004 \"absorbable stents\"  [facility]  9/2022: Arterial duplex(BDM): Severe right lower extremity inflow disease. Severe right lower extremity peripheral arterial disease. Right posterior tibial artery occlusion.   1/2023 MJ:  RIGHT noncompressible, bi/monophasic.  LEFT .74 biphasic.     9/2022 Lower extremity venous duplex(BDM):  No dvt.  RIGHT GSV absent.     1/2020 ECG:  NSR 98, TQc 446  1/2020 Echocardiogram:  EF 45%, LA 32mm, LV 40mm.    Adequate preop studies were completed and the patient was scheduled electively. Operative day Aubrey Montilla admitted through \[facility]", taken to operating suite and placed under general anesthesia.  Underwent said procedure without complications or difficulty. The patient was weaned easily from mechanical ventilation and extubated in the " "recovery room placed on oxygen per nasal cannula and further transferred to CCU for further care and monitoring. Aubrey Montilla remained hemodynamically and neurovascularly stable immediately postop.  POD1 the patient was out of the bed to the chair tolerating breakfast pain controlled with dilaudid pca and stable for transfer to 3W telemetry.  Aubrey Montilla continued with PT/OT progressing well.  Operative incisions clean, dry, intact. Neurovascular status remained stable for discharge home on POD 2.       Vital Signs  Temp:  [95 °F (35 °C)-98.9 °F (37.2 °C)] 95 °F (35 °C)  Heart Rate:  [] 85  Resp:  [18-20] 18  BP: (102-155)/(50-66) 126/62      02/08/23  0500 02/08/23  1555 02/09/23  0537   Weight: 68.6 kg (151 lb 3.2 oz) 68 kg (150 lb) 69 kg (152 lb 3.2 oz)       Pertinent Test Results:  Lab Results   Component Value Date    WBC 13.84 (H) 02/08/2023    HGB 9.6 (L) 02/08/2023    HCT 29.3 (L) 02/08/2023    MCV 90.2 02/08/2023     02/08/2023     Lab Results   Component Value Date    GLUCOSE 130 (H) 02/08/2023    BUN 13 02/08/2023    CREATININE 1.05 02/08/2023    EGFR 77.8 02/08/2023    BCR 12.4 02/08/2023    K 4.5 02/08/2023    CO2 20.0 (L) 02/08/2023    CALCIUM 7.8 (L) 02/08/2023    ALBUMIN 3.70 09/09/2022    AST 14 09/09/2022    ALT 6 09/09/2022       PPhysical Exam   Objective:  General Appearance:  Comfortable, well-appearing, in no acute distress and in pain.    Vital signs: (most recent): Blood pressure 126/62, pulse 85, temperature 95 °F (35 °C), temperature source Temporal, resp. rate 18, height 185.4 cm (72.99\"), weight 69 kg (152 lb 3.2 oz), SpO2 95 %.  Vital signs are normal.    Output: Producing urine and no stool output.    HEENT: Normal HEENT exam.    Lungs:  Normal effort and normal respiratory rate.    Heart: Normal rate.  Regular rhythm.    Chest: No chest wall tenderness.    Abdomen: Abdomen is soft.  Hypoactive bowel sounds.   There is no abdominal tenderness.   "   Extremities: Decreased range of motion.  There is dependent edema.    Pulses: Distal pulses are intact.    Neurological: Patient is alert and oriented to person, place and time.  GCS score is 15.    Pupils:  Pupils are equal, round, and reactive to light.    Skin:  Warm and dry.  (Bilateral groin incision with prevena. )    Additional Instructions for the Follow-ups that You Need to Schedule     Call MD With Problems / Concerns   As directed      Instructions: Instructions: notify provider for uncontrolled pain, shortness of breath, fever or chills, purulent drainage or swelling from the surgical incision site.    Business hours call [phone]    After business hours may call hospital and speak to surgeon on call [phone]    Order Comments: Instructions: Instructions: notify provider for uncontrolled pain, shortness of breath, fever or chills, purulent drainage or swelling from the surgical incision site.  Business hours call [phone]  After business hours may call hospital and speak to surgeon on call [phone]          Discharge Follow-up with PCP   As directed       Currently Documented PCP:    Lorenzo Mondragon MD (Inactive)    PCP Phone Number:    None     Follow Up Details: as scheduled         Discharge Follow-up with Specified Provider: Moses MACARIO; 1 Week   As directed      To: Moses MACARIO    Follow Up: 1 Week               Discharge Instructions:   Discharge instructions include no heavy lifting anything greater than 10lbs for approximately 4 weeks.  No sex or driving for 2-4 weeks. Printed information given to the patient with advancement of activities weekly.  Risks and benefits of narcotic medications and weaning postoperatively have been discussed. Clean operative site with antibacterial soap/water, pat dry. Keep open to air unless draining, then may apply dry dressing.  No ointments or creams unless prescribed by provider. For signs and symptoms of infection including drainage  from operative site, redness, swelling, with associated fever and/or chills [facility] immediately for wound check. If signs and symptoms of ischemia should occur including but not limited to pale/blue discoloration of limb, increasing pain with ambulation or at rest, or a non-healing wound the patient is to notify [facility] for immediate evaluation. Patient verbalizes understanding of discharge instructions, all questions are answered, follow-up appointments have been made. The patient is discharged home in stable condition.            Follow-up Appointments  Future Appointments   Date Time Provider Department Center   2/16/2023  3:00 PM Lobo Dallas APRN [facility] None       Test Results Pending at Discharge        Time: Discharge 45 min

## 2023-02-09 NOTE — DISCHARGE INSTR - OTHER ORDERS
Food Bank:  [facility]- [phone] [address] [place]. [address]    [employer]  [address] [place]. [address]    [employer] [facility]  [address] [place]. [address]    [employer]- [phone]  [address] [place]. [address]

## 2023-02-09 NOTE — DISCHARGE PLACEMENT REQUEST
"Aubrey Wilcox (67 y.o. Male)     Date of Birth   1955    Social Security Number       Address   [address] [address]    Home Phone   [phone]    MRN   [id number]       Restorationism   Mormonism    Marital Status                               Admission Date   2/7/23    Admission Type   Elective    Admitting Provider   Gopi Vasquez MD    Attending Provider       Department, Room/Bed   [address], 312/1       Discharge Date   2/9/2023    Discharge Disposition   Home or Self Care    Discharge Destination                               Attending Provider: (none)   Allergies: Contrast Dye (Echo Or Unknown Ct/mr)    Isolation: None   Infection: None   Code Status: CPR    Ht: 185.4 cm (72.99\")   Wt: 69 kg (152 lb 3.2 oz)    Admission Cmt: None   Principal Problem: PVD (peripheral vascular disease) (HCC) [I73.9]                 Active Insurance as of 2/7/2023     Primary Coverage     Payor Plan Insurance Group Employer/Plan Group    [facility] OPTUM [id number]     Payor Plan Address Payor Plan Phone Number Payor Plan Fax Number Effective Dates    [address] [phone]  5/16/2000 - None Entered    [facility] [id number]       Subscriber Name Subscriber Birth Date Member ID       AUBREY WILCOX 1955 [id number]                 Emergency Contacts      (Rel.) Home Phone Work Phone Mobile Phone    Billie Wilcox (Spouse) [phone] -- [phone]            Insurance Information                [facility] OPTUM Phone: [phone]    Subscriber: RoldanAburey Subscriber#: [id number]    Group#: [id number] Precert#: --             History & Physical      Gopi Vasquez MD at 02/07/23 1221        H&P reviewed.  The patient was examined and there are no changes to the H&P   Electronically signed by Gopi Vasquez MD at 02/07/23 1221   Source Note      " "    1/16/2023    Aubrey Montilla  1955    Chief Complaint:    Chief Complaint   Patient presents with   • Peripheral Vascular Disease       HPI:      PCP:  Dr Jiménez  Cardiology:  Dr Naik ([facility])    67 y.o. male with HTN(stable, increased risk stroke, rupture), Hyperlipidemia(stable, increased risk cardiovascular events) and COPD(stable, increased risk pulmonary complications)  CAD. CVA. PVD (chronic severe progression, increase risk cardiovascular events)  former smoker. Failed RIGHT Fem-Tib (2003). Hamman/Keven. Most recent evaluation Dr. Aviles ([facility]) [facility]. RLE pain x 1mo, worsening claudication. No TIA, stroke, or amaurosis.  No MI or claudication. No other associated signs, symptoms or modifying factors.     2003 RIGHT femoral tibial bypass (SVG)  2004 \"absorbable stents\"  [facility]  9/2022: Arterial duplex(BDM): Severe right lower extremity inflow disease. Severe right lower extremity peripheral arterial disease. Right posterior tibial artery occlusion.   1/2023 MJ:  RIGHT noncompressible, bi/monophasic.  LEFT .74 biphasic.    9/2022 Lower extremity venous duplex(BDM):  No dvt.  RIGHT GSV absent.     1/2020 ECG:  NSR 98, TQc 446  1/2020 Echocardiogram:  EF 45%, LA 32mm, LV 40mm.    The following portions of the patient's history were reviewed and updated as appropriate: allergies, current medications, past family history, past medical history, past social history, past surgical history and problem list.  Recent images independently reviewed.  Available laboratory values reviewed.    PMH:  Past Medical History:   Diagnosis Date   • COPD (chronic obstructive pulmonary disease) (HCC)    • Elevated cholesterol    • Hypertension    • Myocardial infarct (HCC) 2003   • Stroke (HCC) 2015    3-4 strokes in past; last one 2015--has affected memory/balance   • Weight decreasing     over last 3 weeks     Past Surgical History:   Procedure Laterality Date   • ENDOSCOPY N/A 8/29/2017    " Procedure: ESOPHAGOGASTRODUODENOSCOPY;  Surgeon: Van Khoury DO;  Location: [facility] ENDOSCOPY;  Service:    • ENDOSCOPY N/A 6/11/2019    Procedure: ESOPHAGOGASTRODUODENOSCOPY;  Surgeon: Van Khoury DO;  Location: [facility] ENDOSCOPY;  Service: Gastroenterology   • ENDOSCOPY N/A 10/22/2020    Procedure: ESOPHAGOGASTRODUODENOSCOPY;  Surgeon: Van Khoury DO;  Location: [facility] ENDOSCOPY;  Service: Gastroenterology;  Laterality: N/A;  savory dilation 42-48   • ENDOSCOPY N/A 6/9/2022    Procedure: ESOPHAGOGASTRODUODENOSCOPY;  Surgeon: Van Khoury DO;  Location: [facility] ENDOSCOPY;  Service: Gastroenterology;  Laterality: N/A;     No family history on file.  Social History     Tobacco Use   • Smoking status: Former   • Smokeless tobacco: Never   Vaping Use   • Vaping Use: Never used   Substance Use Topics   • Alcohol use: Not Currently   • Drug use: Never       ALLERGIES:  Allergies   Allergen Reactions   • Contrast Dye (Echo Or Unknown Ct/Mr) Itching         MEDICATIONS:    Current Outpatient Medications:   •  albuterol (PROVENTIL HFA;VENTOLIN HFA) 108 (90 Base) MCG/ACT inhaler, Inhale 2 puffs Every 4 (Four) Hours As Needed for Wheezing., Disp: , Rfl:   •  atenolol (TENORMIN) 25 MG tablet, Take 1 tablet by mouth Daily., Disp: , Rfl:   •  atorvastatin (LIPITOR) 40 MG tablet, Take 1 tablet by mouth Every Night., Disp: , Rfl:   •  budesonide-formoterol (SYMBICORT) 160-4.5 MCG/ACT inhaler, Inhale 2 puffs 2 (Two) Times a Day., Disp: , Rfl:   •  clopidogrel (PLAVIX) 75 MG tablet, Take 75 mg by mouth Daily., Disp: , Rfl:   •  cyclobenzaprine (FLEXERIL) 10 MG tablet, Take 10 mg by mouth 3 (Three) Times a Day As Needed for Muscle Spasms., Disp: , Rfl:   •  docusate sodium 100 MG capsule, Take 100 mg by mouth Daily., Disp: , Rfl:   •  gabapentin (NEURONTIN) 100 MG capsule, Take 2 capsules by mouth 3 (Three) Times a Day., Disp: 9 capsule, Rfl: 0    Review of Systems   Review of Systems   Constitutional: Positive  "for malaise/fatigue. Negative for weight loss.   Cardiovascular: Positive for claudication. Negative for chest pain and dyspnea on exertion.   Respiratory: Negative for cough and shortness of breath.    Skin: Positive for color change. Negative for poor wound healing.   Musculoskeletal: Positive for myalgias.   Neurological: Positive for numbness and paresthesias. Negative for dizziness and weakness.       Physical Exam   Vitals:    01/16/23 1427 01/16/23 1428   BP: 148/70 150/76   BP Location: Left arm Right arm   Pulse: 112    Temp: 97.7 °F (36.5 °C)    TempSrc: Infrared    SpO2: 97%    Weight: 64.4 kg (142 lb)    Height: 185.4 cm (73\")      Body surface area is 1.86 meters squared.  Body mass index is 18.73 kg/m².  Physical Exam  Constitutional:       General: He is not in acute distress.     Appearance: He is not ill-appearing.   HENT:      Right Ear: Hearing normal.      Left Ear: Hearing normal.      Nose: No nasal deformity.      Mouth/Throat:      Dentition: Normal dentition. Does not have dentures.   Cardiovascular:      Rate and Rhythm: Normal rate and regular rhythm.      Pulses:           Carotid pulses are 2+ on the right side and 2+ on the left side.       Radial pulses are 2+ on the right side and 2+ on the left side.        Dorsalis pedis pulses are 0 on the right side and 1+ on the left side.        Posterior tibial pulses are 0 on the right side and 1+ on the left side.      Heart sounds: No murmur heard.  Pulmonary:      Effort: Pulmonary effort is normal.      Breath sounds: Normal breath sounds.   Abdominal:      General: There is no distension.      Palpations: Abdomen is soft. There is no mass.      Tenderness: There is no abdominal tenderness.   Musculoskeletal:         General: No deformity.      Comments: Gait normal.    Skin:     General: Skin is warm and dry.      Coloration: Skin is not pale.      Findings: No erythema.      Comments: No venous staining   Neurological:      Mental " Status: He is alert and oriented to person, place, and time.   Psychiatric:         Speech: Speech normal.         Behavior: Behavior is cooperative.         Thought Content: Thought content normal.         Judgment: Judgment normal.         BUN   Date Value Ref Range Status   09/09/2022 18 8 - 23 mg/dL Final     Creatinine   Date Value Ref Range Status   09/09/2022 1.03 0.76 - 1.27 mg/dL Final     eGFR Non  Amer   Date Value Ref Range Status   09/30/2021 59 (L) >60 mL/min/1.73 Final       ASSESSMENT:  Diagnoses and all orders for this visit:    1. PVD (peripheral vascular disease) (HCC) (Primary)  -     Case Request; Standing  -     Type & Screen; Future  -     sodium chloride 0.9 % infusion  -     ceFAZolin (ANCEF) 2 g in sodium chloride 0.9 % 100 mL IVPB  -     sodium chloride 0.9 % flush 10 mL  -     sodium chloride 0.9 % flush 10 mL  -     sodium chloride 0.9 % infusion 40 mL  -     Case Request    2. Primary hypertension    3. Mixed hyperlipidemia    4. PVD (peripheral vascular disease) with claudication ([facility])    5. Cerebrovascular accident (CVA), unspecified mechanism ([facility])    Other orders  -     Provide NPO Instructions to Patient; Future  -     Chlorhexidine Skin Prep; Future  -     Inpatient Admission; Standing  -     Obtain informed consent; Standing  -     Clip bilateral groins; Standing  -     Insert Arterial Line; Standing  -     Insert Peripheral IV; Standing  -     Saline Lock & Maintain IV Access; Standing    PLAN:  Detailed discussion with Aubrey Montilla regarding situation, options and plans. severe lifestyle limiting claudication. femoral to femoral artery bypass is advisable.  Prior failed distal bypass, stents.  Will plan to address inflow only.    Risks including but not limited to Mortality, Major Morbidity 2-3%, bleeding, transfusion, infection, pulmonary (prolonged mechanical ventilation, tracheostomy), renal dysfunction (dialysis), blood vessel, nerve injury.  Benefits:  relief  "of symptoms, reduction in vascular events, limb loss and hospitalization.  Options:  peripheral angioplasty/stent, medical therapy, amputation discussed.  Understands and wishes to proceed.    femoral to femoral artery bypass. (PTFE). Lower extremity arteriogram.  GEN.  SDS.  2/7/2023    Return after above studies complete    Recommended regular physical activity, progressive walking program.  Continue current medications as directed.  Advance Care Planning   ACP discussion was declined by the patient. Patient does not have an advance directive, declines further assistance.    Thank you for the opportunity to participate in this patient's care.    Copy to primary care provider.      Electronically signed by Gopi Vasquez MD at 01/17/23 0784             Gopi Vasquez MD at 02/07/23 1221        H&P reviewed.  The patient was examined and there are no changes to the H&P   Electronically signed by Gopi Vasquez MD at 02/07/23 1221   Source Note          1/16/2023    Aubrey Montilla  1955    Chief Complaint:    Chief Complaint   Patient presents with   • Peripheral Vascular Disease       HPI:      PCP:  Dr Jiménez  Cardiology:  Dr Naik ([facility])    67 y.o. male with HTN(stable, increased risk stroke, rupture), Hyperlipidemia(stable, increased risk cardiovascular events) and COPD(stable, increased risk pulmonary complications)  CAD. CVA. PVD (chronic severe progression, increase risk cardiovascular events)  former smoker. Failed RIGHT Fem-Tib (2003). Hamman/Keven. Most recent evaluation Dr. Aviles ([facility]) [facility]. RLE pain x 1mo, worsening claudication. No TIA, stroke, or amaurosis.  No MI or claudication. No other associated signs, symptoms or modifying factors.     2003 RIGHT femoral tibial bypass (SVG)  2004 \"absorbable stents\"  [facility]  9/2022: Arterial duplex(BDM): Severe right lower extremity inflow disease. Severe right lower extremity peripheral arterial " disease. Right posterior tibial artery occlusion.   1/2023 MJ:  RIGHT noncompressible, bi/monophasic.  LEFT .74 biphasic.    9/2022 Lower extremity venous duplex(BDM):  No dvt.  RIGHT GSV absent.     1/2020 ECG:  NSR 98, TQc 446  1/2020 Echocardiogram:  EF 45%, LA 32mm, LV 40mm.    The following portions of the patient's history were reviewed and updated as appropriate: allergies, current medications, past family history, past medical history, past social history, past surgical history and problem list.  Recent images independently reviewed.  Available laboratory values reviewed.    PMH:  Past Medical History:   Diagnosis Date   • COPD (chronic obstructive pulmonary disease) ([facility])    • Elevated cholesterol    • Hypertension    • Myocardial infarct ([facility]) 2003   • Stroke ([facility]) 2015    3-4 strokes in past; last one 2015--has affected memory/balance   • Weight decreasing     over last 3 weeks     Past Surgical History:   Procedure Laterality Date   • ENDOSCOPY N/A 8/29/2017    Procedure: ESOPHAGOGASTRODUODENOSCOPY;  Surgeon: Van Khoury DO;  Location: [facility] ENDOSCOPY;  Service:    • ENDOSCOPY N/A 6/11/2019    Procedure: ESOPHAGOGASTRODUODENOSCOPY;  Surgeon: Van Khoury DO;  Location: [facility] ENDOSCOPY;  Service: Gastroenterology   • ENDOSCOPY N/A 10/22/2020    Procedure: ESOPHAGOGASTRODUODENOSCOPY;  Surgeon: Van Khoury DO;  Location: [facility] ENDOSCOPY;  Service: Gastroenterology;  Laterality: N/A;  savory dilation 42-48   • ENDOSCOPY N/A 6/9/2022    Procedure: ESOPHAGOGASTRODUODENOSCOPY;  Surgeon: Van Khoury DO;  Location: [facility] ENDOSCOPY;  Service: Gastroenterology;  Laterality: N/A;     No family history on file.  Social History     Tobacco Use   • Smoking status: Former   • Smokeless tobacco: Never   Vaping Use   • Vaping Use: Never used   Substance Use Topics   • Alcohol use: Not Currently   • Drug use: Never       ALLERGIES:  Allergies   Allergen Reactions   • Contrast Dye (Echo Or  "Unknown Ct/Mr) Itching         MEDICATIONS:    Current Outpatient Medications:   •  albuterol (PROVENTIL HFA;VENTOLIN HFA) 108 (90 Base) MCG/ACT inhaler, Inhale 2 puffs Every 4 (Four) Hours As Needed for Wheezing., Disp: , Rfl:   •  atenolol (TENORMIN) 25 MG tablet, Take 1 tablet by mouth Daily., Disp: , Rfl:   •  atorvastatin (LIPITOR) 40 MG tablet, Take 1 tablet by mouth Every Night., Disp: , Rfl:   •  budesonide-formoterol (SYMBICORT) 160-4.5 MCG/ACT inhaler, Inhale 2 puffs 2 (Two) Times a Day., Disp: , Rfl:   •  clopidogrel (PLAVIX) 75 MG tablet, Take 75 mg by mouth Daily., Disp: , Rfl:   •  cyclobenzaprine (FLEXERIL) 10 MG tablet, Take 10 mg by mouth 3 (Three) Times a Day As Needed for Muscle Spasms., Disp: , Rfl:   •  docusate sodium 100 MG capsule, Take 100 mg by mouth Daily., Disp: , Rfl:   •  gabapentin (NEURONTIN) 100 MG capsule, Take 2 capsules by mouth 3 (Three) Times a Day., Disp: 9 capsule, Rfl: 0    Review of Systems   Review of Systems   Constitutional: Positive for malaise/fatigue. Negative for weight loss.   Cardiovascular: Positive for claudication. Negative for chest pain and dyspnea on exertion.   Respiratory: Negative for cough and shortness of breath.    Skin: Positive for color change. Negative for poor wound healing.   Musculoskeletal: Positive for myalgias.   Neurological: Positive for numbness and paresthesias. Negative for dizziness and weakness.       Physical Exam   Vitals:    01/16/23 1427 01/16/23 1428   BP: 148/70 150/76   BP Location: Left arm Right arm   Pulse: 112    Temp: 97.7 °F (36.5 °C)    TempSrc: Infrared    SpO2: 97%    Weight: 64.4 kg (142 lb)    Height: 185.4 cm (73\")      Body surface area is 1.86 meters squared.  Body mass index is 18.73 kg/m².  Physical Exam  Constitutional:       General: He is not in acute distress.     Appearance: He is not ill-appearing.   HENT:      Right Ear: Hearing normal.      Left Ear: Hearing normal.      Nose: No nasal deformity.      " Mouth/Throat:      Dentition: Normal dentition. Does not have dentures.   Cardiovascular:      Rate and Rhythm: Normal rate and regular rhythm.      Pulses:           Carotid pulses are 2+ on the right side and 2+ on the left side.       Radial pulses are 2+ on the right side and 2+ on the left side.        Dorsalis pedis pulses are 0 on the right side and 1+ on the left side.        Posterior tibial pulses are 0 on the right side and 1+ on the left side.      Heart sounds: No murmur heard.  Pulmonary:      Effort: Pulmonary effort is normal.      Breath sounds: Normal breath sounds.   Abdominal:      General: There is no distension.      Palpations: Abdomen is soft. There is no mass.      Tenderness: There is no abdominal tenderness.   Musculoskeletal:         General: No deformity.      Comments: Gait normal.    Skin:     General: Skin is warm and dry.      Coloration: Skin is not pale.      Findings: No erythema.      Comments: No venous staining   Neurological:      Mental Status: He is alert and oriented to person, place, and time.   Psychiatric:         Speech: Speech normal.         Behavior: Behavior is cooperative.         Thought Content: Thought content normal.         Judgment: Judgment normal.         BUN   Date Value Ref Range Status   09/09/2022 18 8 - 23 mg/dL Final     Creatinine   Date Value Ref Range Status   09/09/2022 1.03 0.76 - 1.27 mg/dL Final     eGFR Non  Amer   Date Value Ref Range Status   09/30/2021 59 (L) >60 mL/min/1.73 Final       ASSESSMENT:  Diagnoses and all orders for this visit:    1. PVD (peripheral vascular disease) (HCC) (Primary)  -     Case Request; Standing  -     Type & Screen; Future  -     sodium chloride 0.9 % infusion  -     ceFAZolin (ANCEF) 2 g in sodium chloride 0.9 % 100 mL IVPB  -     sodium chloride 0.9 % flush 10 mL  -     sodium chloride 0.9 % flush 10 mL  -     sodium chloride 0.9 % infusion 40 mL  -     Case Request    2. Primary hypertension    3.  Mixed hyperlipidemia    4. PVD (peripheral vascular disease) with claudication (HCC)    5. Cerebrovascular accident (CVA), unspecified mechanism (HCC)    Other orders  -     Provide NPO Instructions to Patient; Future  -     Chlorhexidine Skin Prep; Future  -     Inpatient Admission; Standing  -     Obtain informed consent; Standing  -     Clip bilateral groins; Standing  -     Insert Arterial Line; Standing  -     Insert Peripheral IV; Standing  -     Saline Lock & Maintain IV Access; Standing    PLAN:  Detailed discussion with Aubrey Montilla regarding situation, options and plans. severe lifestyle limiting claudication. femoral to femoral artery bypass is advisable.  Prior failed distal bypass, stents.  Will plan to address inflow only.    Risks including but not limited to Mortality, Major Morbidity 2-3%, bleeding, transfusion, infection, pulmonary (prolonged mechanical ventilation, tracheostomy), renal dysfunction (dialysis), blood vessel, nerve injury.  Benefits:  relief of symptoms, reduction in vascular events, limb loss and hospitalization.  Options:  peripheral angioplasty/stent, medical therapy, amputation discussed.  Understands and wishes to proceed.    femoral to femoral artery bypass. (PTFE). Lower extremity arteriogram.  GEN.  SDS.  2/7/2023    Return after above studies complete    Recommended regular physical activity, progressive walking program.  Continue current medications as directed.  Advance Care Planning   ACP discussion was declined by the patient. Patient does not have an advance directive, declines further assistance.    Thank you for the opportunity to participate in this patient's care.    Copy to primary care provider.      Electronically signed by Gopi Vasquez MD at 01/17/23 0552             Gopi Vasquez MD at 02/07/23 1221        H&P reviewed.  The patient was examined and there are no changes to the H&P   Electronically signed by Gopi Vasquez MD  "at 02/07/23 1221   Source Note          1/16/2023    Aubrey Montilla  1955    Chief Complaint:    Chief Complaint   Patient presents with   • Peripheral Vascular Disease       HPI:      PCP:  Dr Jiménez  Cardiology:  Dr Naik ([facility])    67 y.o. male with HTN(stable, increased risk stroke, rupture), Hyperlipidemia(stable, increased risk cardiovascular events) and COPD(stable, increased risk pulmonary complications)  CAD. CVA. PVD (chronic severe progression, increase risk cardiovascular events)  former smoker. Failed RIGHT Fem-Tib (2003). Hamman/Keven. Most recent evaluation Dr. Aviles ([facility]) [facility]. RLE pain x 1mo, worsening claudication. No TIA, stroke, or amaurosis.  No MI or claudication. No other associated signs, symptoms or modifying factors.     2003 RIGHT femoral tibial bypass (SVG)  2004 \"absorbable stents\"  [facility]  9/2022: Arterial duplex(BDM): Severe right lower extremity inflow disease. Severe right lower extremity peripheral arterial disease. Right posterior tibial artery occlusion.   1/2023 MJ:  RIGHT noncompressible, bi/monophasic.  LEFT .74 biphasic.    9/2022 Lower extremity venous duplex(BDM):  No dvt.  RIGHT GSV absent.     1/2020 ECG:  NSR 98, TQc 446  1/2020 Echocardiogram:  EF 45%, LA 32mm, LV 40mm.    The following portions of the patient's history were reviewed and updated as appropriate: allergies, current medications, past family history, past medical history, past social history, past surgical history and problem list.  Recent images independently reviewed.  Available laboratory values reviewed.    PMH:  Past Medical History:   Diagnosis Date   • COPD (chronic obstructive pulmonary disease) (HCC)    • Elevated cholesterol    • Hypertension    • Myocardial infarct (HCC) 2003   • Stroke ([facility]) 2015    3-4 strokes in past; last one 2015--has affected memory/balance   • Weight decreasing     over last 3 weeks     Past Surgical History:   Procedure Laterality " Date   • ENDOSCOPY N/A 8/29/2017    Procedure: ESOPHAGOGASTRODUODENOSCOPY;  Surgeon: Van Khoury DO;  Location: [facility] ENDOSCOPY;  Service:    • ENDOSCOPY N/A 6/11/2019    Procedure: ESOPHAGOGASTRODUODENOSCOPY;  Surgeon: Van Khoury DO;  Location: [facility] ENDOSCOPY;  Service: Gastroenterology   • ENDOSCOPY N/A 10/22/2020    Procedure: ESOPHAGOGASTRODUODENOSCOPY;  Surgeon: Van Khoury DO;  Location: [facility] ENDOSCOPY;  Service: Gastroenterology;  Laterality: N/A;  savory dilation 42-48   • ENDOSCOPY N/A 6/9/2022    Procedure: ESOPHAGOGASTRODUODENOSCOPY;  Surgeon: Van Khoury DO;  Location: [facility] ENDOSCOPY;  Service: Gastroenterology;  Laterality: N/A;     No family history on file.  Social History     Tobacco Use   • Smoking status: Former   • Smokeless tobacco: Never   Vaping Use   • Vaping Use: Never used   Substance Use Topics   • Alcohol use: Not Currently   • Drug use: Never       ALLERGIES:  Allergies   Allergen Reactions   • Contrast Dye (Echo Or Unknown Ct/Mr) Itching         MEDICATIONS:    Current Outpatient Medications:   •  albuterol (PROVENTIL HFA;VENTOLIN HFA) 108 (90 Base) MCG/ACT inhaler, Inhale 2 puffs Every 4 (Four) Hours As Needed for Wheezing., Disp: , Rfl:   •  atenolol (TENORMIN) 25 MG tablet, Take 1 tablet by mouth Daily., Disp: , Rfl:   •  atorvastatin (LIPITOR) 40 MG tablet, Take 1 tablet by mouth Every Night., Disp: , Rfl:   •  budesonide-formoterol (SYMBICORT) 160-4.5 MCG/ACT inhaler, Inhale 2 puffs 2 (Two) Times a Day., Disp: , Rfl:   •  clopidogrel (PLAVIX) 75 MG tablet, Take 75 mg by mouth Daily., Disp: , Rfl:   •  cyclobenzaprine (FLEXERIL) 10 MG tablet, Take 10 mg by mouth 3 (Three) Times a Day As Needed for Muscle Spasms., Disp: , Rfl:   •  docusate sodium 100 MG capsule, Take 100 mg by mouth Daily., Disp: , Rfl:   •  gabapentin (NEURONTIN) 100 MG capsule, Take 2 capsules by mouth 3 (Three) Times a Day., Disp: 9 capsule, Rfl: 0    Review of Systems   Review  "of Systems   Constitutional: Positive for malaise/fatigue. Negative for weight loss.   Cardiovascular: Positive for claudication. Negative for chest pain and dyspnea on exertion.   Respiratory: Negative for cough and shortness of breath.    Skin: Positive for color change. Negative for poor wound healing.   Musculoskeletal: Positive for myalgias.   Neurological: Positive for numbness and paresthesias. Negative for dizziness and weakness.       Physical Exam   Vitals:    01/16/23 1427 01/16/23 1428   BP: 148/70 150/76   BP Location: Left arm Right arm   Pulse: 112    Temp: 97.7 °F (36.5 °C)    TempSrc: Infrared    SpO2: 97%    Weight: 64.4 kg (142 lb)    Height: 185.4 cm (73\")      Body surface area is 1.86 meters squared.  Body mass index is 18.73 kg/m².  Physical Exam  Constitutional:       General: He is not in acute distress.     Appearance: He is not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 stairs, all skills  -HM     [place] Level/Cues Needed (Stairs Goal 1, PT) standby assist  -HM     Number of Stairs (Stairs Goal 1, PT) 3  -HM     Time Frame (Stairs Goal 1, PT) by discharge  -HM     Progress/Outcome  (Stairs Goal 1, PT) new goal;goal not met  -     Row Name 02/08/23 1324          Therapy Assessment/Plan (PT)    Planned Therapy Interventions (PT) balance training;bed mobility training;gait training;neuromuscular re-education;patient/family education;stair training;strengthening;transfer training  -           User Key  (r) = Recorded By, (t) = Taken By, (c) = Cosigned By    Initials Name Provider Type     Claudine Perdomo, PT Physical Therapist               Clinical Impression     Row Name 02/08/23 1331          Pain    Pretreatment Pain Rating 3/10  -     Posttreatment Pain Rating 0/10 - no pain  -     Pain Location - Side/Orientation Bilateral  -     Pain Location anterior  -     Pain Location - hip  -     Pain Intervention(s) Repositioned;Ambulation/increased activity  -     Row Name 02/08/23 1331          Plan of Care Review    Plan of Care Reviewed With patient  -     Outcome Evaluation PT Enrrique completed this date. Patient is A&Ox4 w/cueing and agreeable to evaluation. Reports mild pain with movement in hips where procedures were done. LE strength is diminished and patient requires CGA for STS and ambulation using FWW. No LOB but patient requires cueing for managing AD, maintaining safe distance to AD and for sequencing. Patient would benefit from skilled PT during hospital stay to addres strength, balance, endurance and mobility deficits. Recommending home w/HHPT.  -     Row Name 02/08/23 1331          Therapy Assessment/Plan (PT)    Rehab Potential (PT) good, to achieve stated therapy goals  -     Criteria for Skilled Interventions Met (PT) yes;meets criteria;skilled treatment is necessary  -     Therapy Frequency (PT) other (see comments)  5-7d/wk  -     Row Name 02/08/23 1331          Vital Signs    Pre Systolic BP Rehab 111  -HM     Pre Treatment Diastolic BP 56  -HM     Post Systolic BP Rehab 136  -HM     Post Treatment Diastolic BP 80  -HM     Pretreatment Heart Rate  (beats/min) 79  -HM     Posttreatment Heart Rate (beats/min) 82  -HM     Pre SpO2 (%) 99  -HM     O2 Delivery Pre Treatment room air  -HM     Post SpO2 (%) 100  -HM     O2 Delivery Post Treatment room air  -HM     Pre Patient Position Sitting  -HM     Intra Patient Position Standing  -HM     Post Patient Position Sitting  -HM     Row Name 02/08/23 1331          Positioning and Restraints    Pre-Treatment Position sitting in chair/recliner  -HM     Post Treatment Position chair  -HM     In Chair sitting;call light within reach;encouraged to call for assist  -           User Key  (r) = Recorded By, (t) = Taken By, (c) = Cosigned By    Initials Name Provider Type     Claudine Perdomo PT Physical Therapist               Outcome Measures     Row Name 02/08/23 1324          How much help from another person do you currently need...    Turning from your back to your side while in flat bed without using bedrails? 3  -HM     Moving from lying on back to sitting on the side of a flat bed without bedrails? 3  -HM     Moving to and from a bed to a chair (including a wheelchair)? 3  -HM     Standing up from a chair using your arms (e.g., wheelchair, bedside chair)? 3  -HM     Climbing 3-5 steps with a railing? 3  -HM     To walk in hospital room? 3  -HM     AM-PAC 6 Clicks Score (PT) 18  -HM     Highest level of mobility 6 --> Walked 10 steps or more  -     Row Name 02/08/23 1324          Functional Assessment    Outcome Measure Options AM-PAC 6 Clicks Basic Mobility (PT)  -HM           User Key  (r) = Recorded By, (t) = Taken By, (c) = Cosigned By    Initials Name Provider Type     Claudine Perdomo PT Physical Therapist                             Physical Therapy Education     Title: PT OT SLP Therapies (In Progress)     Topic: Physical Therapy (In Progress)     Point: Mobility training (Done)     Learning Progress Summary           Patient ESTELLA Mckee VU by  at 2/8/2023 1413    Comment: POC and goals                    Point: Home exercise program (Not Started)     Learner Progress:  Not documented in this visit.          Point: Body mechanics (Not Started)     Learner Progress:  Not documented in this visit.          Point: Precautions (Not Started)     Learner Progress:  Not documented in this visit.                      User Key     Initials Effective Dates Name Provider Type [facility] 01/09/23 -  Claudine Perdomo PT Physical Therapist PT              PT Recommendation and Plan  Planned Therapy Interventions (PT): balance training, bed mobility training, gait training, neuromuscular re-education, patient/family education, stair training, strengthening, transfer training  Plan of Care Reviewed With: patient  Outcome Evaluation: PT Eval completed this date. Patient is A&Ox4 w/cueing and agreeable to evaluation. Reports mild pain with movement in hips where procedures were done. LE strength is diminished and patient requires CGA for STS and ambulation using FWW. No LOB but patient requires cueing for managing AD, maintaining safe distance to AD and for sequencing. Patient would benefit from skilled PT during hospital stay to addres strength, balance, endurance and mobility deficits. Recommending home w/HHPT.     Time Calculation:    PT Charges     Row Name 02/08/23 1321             Time Calculation    Start Time 1324  -HM      Stop Time 1405  -HM      Time Calculation (min) 41 min  -HM      PT Received On 02/08/23  -      PT Goal Re-Cert Due Date 02/21/23  -         Untimed Charges    PT Eval/Re-eval Minutes 41  -HM         Total Minutes    Untimed Charges Total Minutes 41  -HM       Total Minutes 41  -HM            User Key  (r) = Recorded By, (t) = Taken By, (c) = Cosigned By    Initials Name Provider Type     Claudine Perdomo PT Physical Therapist              Therapy Charges for Today     Code Description Service Date Service Provider Modifiers Qty    [id number] HC PT EVAL MOD COMPLEXITY 3 2/8/2023  Claudine Perdomo, PT GP 1          PT G-Codes  Outcome Measure Options: AM-PAC 6 Clicks Basic Mobility (PT)  AM-PAC 6 Clicks Score (PT): 18  PT Discharge Summary  Anticipated Discharge Disposition (PT): home with home health    Claudine Perdomo PT  2/8/2023      Electronically signed by Claudine Perdomo PT at 02/08/23 1417       Occupational Therapy Notes (last 24 hours)  Notes from 02/08/23 1046 through 02/09/23 1046   No notes exist for this encounter.

## 2023-02-10 NOTE — OUTREACH NOTE
Prep Survey    Flowsheet Row Responses   [facility] [place]   Is LACE score < 7 ? No   Eligibility Readm Mgmt   Discharge diagnosis PVD,  fem/fem bypass   Does the patient have one of the following disease processes/diagnoses(primary or secondary)? General Surgery   Does the patient have Home health ordered? Yes   What is the Home health agency?  [facility]   Is there a DME ordered? No   Prep survey completed? Yes          Dayana Canas Registered Nurse

## 2023-02-13 ENCOUNTER — READMISSION MANAGEMENT (OUTPATIENT)
Dept: CALL CENTER | Facility: HOSPITAL | Age: 68
End: 2023-02-13
Payer: OTHER GOVERNMENT

## 2023-02-13 NOTE — OUTREACH NOTE
General Surgery Week 1 Survey    Flowsheet Row Responses   [facility] patient discharged from? [place]   Does the patient have one of the following disease processes/diagnoses(primary or secondary)? General Surgery   Week 1 attempt successful? Yes   Call start time 1116   Call end time 1125   Discharge diagnosis PVD,  fem/fem bypass   Is patient permission given to speak with other caregiver? Yes   List who call center can speak with Spouse/patient   Person spoke with today (if not patient) and relationship Spouse/patient   Meds reviewed with patient/caregiver? Yes   Is the patient having any side effects they believe may be caused by any medication additions or changes? No   Does the patient have all medications related to this admission filled (includes all antibiotics, pain medications, etc.) No   What is keeping the patient from filling the prescriptions? --  [[facility]. [facility] and [facility].]   Nursing Interventions No intervention needed   Is the patient taking all medications as directed (includes completed medication regime)? Yes   Does the patient have a follow up appointment scheduled with their surgeon? Yes   Has the patient kept scheduled appointments due by today? N/A   Comments Surgery follow up 2/16/23. needs to schedule PCP appointment   Has home health visited the patient within 72 hours of discharge? No   Home health comments Patient does not feel he needs home health   Psychosocial issues? No   Did the patient receive a copy of their discharge instructions? Yes   Nursing interventions Reviewed instructions with patient   What is the patient's perception of their health status since discharge? Improving   Nursing interventions Nurse provided patient education   Is the patient /caregiver able to teach back basic post-op care? Keep incision areas clean,dry and  protected, No tub bath, swimming, or hot tub until instructed by MD, Drive as instructed by MD in discharge instructions   Is the patient/caregiver able to teach back signs and symptoms of incisional infection? Increased redness, swelling or pain at the incisonal site, Increased drainage or bleeding, Incisional warmth, Pus or odor from incision, Fever   Is the patient/caregiver able to teach back steps to recovery at home? Set small, achievable goals for return to baseline health, Rest and rebuild strength, gradually increase activity, Eat a well-balance diet   If the patient is a current smoker, are they able to teach back resources for cessation? Not a smoker   Is the patient/caregiver able to teach back the hierarchy of who to call/visit for symptoms/problems? PCP, Specialist, Home health nurse, Urgent Care, ED, 911 Yes   Week 1 call completed? Yes   Is the patient interested in additional calls from an ambulatory ?  NOTE:  applies to high risk patients requiring additional follow-up. No          Zaira BRINK - Registered Nurse

## 2023-02-16 ENCOUNTER — OFFICE VISIT (OUTPATIENT)
Dept: CARDIAC SURGERY | Facility: CLINIC | Age: 68
End: 2023-02-16
Payer: OTHER GOVERNMENT

## 2023-02-16 VITALS
BODY MASS INDEX: 20.15 KG/M2 | HEIGHT: 73 IN | OXYGEN SATURATION: 97 % | SYSTOLIC BLOOD PRESSURE: 142 MMHG | TEMPERATURE: 97.5 F | HEART RATE: 101 BPM | WEIGHT: 152 LBS | DIASTOLIC BLOOD PRESSURE: 80 MMHG

## 2023-02-16 DIAGNOSIS — I73.9 PVD (PERIPHERAL VASCULAR DISEASE): Primary | ICD-10-CM

## 2023-02-16 DIAGNOSIS — F17.200 SMOKER: ICD-10-CM

## 2023-02-16 DIAGNOSIS — E78.2 MIXED HYPERLIPIDEMIA: ICD-10-CM

## 2023-02-16 DIAGNOSIS — Z48.812 SURGICAL AFTERCARE, CIRCULATORY SYSTEM: ICD-10-CM

## 2023-02-16 PROCEDURE — 99024 POSTOP FOLLOW-UP VISIT: CPT | Performed by: NURSE PRACTITIONER

## 2023-02-16 NOTE — PATIENT INSTRUCTIONS
Clean operative site with antibacterial soap/water, pat dry. Keep open to air unless draining, then may apply dry dressing.  No ointments or creams unless prescribed by provider.    Lifting restrictions <10lbs x4 weeks from surgery date  No tub baths    Incisions CDI     Follow up vascular studies with graft duplex and MJ in 6 weeks
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
## 2023-02-28 ENCOUNTER — TELEPHONE (OUTPATIENT)
Dept: SOCIAL WORK | Facility: HOSPITAL | Age: 68
End: 2023-02-28
Payer: OTHER GOVERNMENT

## 2023-02-28 LAB
ACT BLD: 139 SECONDS (ref 82–152)
ACT BLD: 139 SECONDS (ref 82–152)
ACT BLD: 237 SECONDS (ref 82–152)
ACT BLD: 254 SECONDS (ref 82–152)
ACT BLD: 267 SECONDS (ref 82–152)

## 2023-02-28 NOTE — TELEPHONE ENCOUNTER
KELLI received a call from Vianca Hanna with [facility] on 2/27/2023.  Per Vianca she has had trouble receiving her new referrals.  Vianca reports she just received the referral KELLI sent for patient on 2/7/23.  KELLI updated Vianca that patient has been discharged from the hospital and she states she will reach out to patient via telephone... PEEWEE Osman

## 2023-03-17 ENCOUNTER — OFFICE VISIT (OUTPATIENT)
Dept: CARDIAC SURGERY | Facility: CLINIC | Age: 68
End: 2023-03-17
Payer: OTHER GOVERNMENT

## 2023-03-17 VITALS
DIASTOLIC BLOOD PRESSURE: 74 MMHG | WEIGHT: 152 LBS | SYSTOLIC BLOOD PRESSURE: 147 MMHG | BODY MASS INDEX: 20.15 KG/M2 | HEIGHT: 73 IN | HEART RATE: 88 BPM | OXYGEN SATURATION: 98 %

## 2023-03-17 DIAGNOSIS — Z48.812 SURGICAL AFTERCARE, CIRCULATORY SYSTEM: ICD-10-CM

## 2023-03-17 DIAGNOSIS — I73.9 PVD (PERIPHERAL VASCULAR DISEASE) WITH CLAUDICATION: Primary | ICD-10-CM

## 2023-03-17 DIAGNOSIS — E78.2 MIXED HYPERLIPIDEMIA: ICD-10-CM

## 2023-03-17 PROCEDURE — 99024 POSTOP FOLLOW-UP VISIT: CPT | Performed by: NURSE PRACTITIONER

## 2023-03-17 NOTE — PROGRESS NOTES
"1/16/2023    Aubrey Montilla  1955    Chief Complaint:    Chief Complaint   Patient presents with   • Peripheral Vascular Disease       HPI:      PCP:  Dr Jiménez  Cardiology:  Dr Naik ([facility])    67 y.o. male with HTN(stable, increased risk stroke, rupture), Hyperlipidemia(stable, increased risk cardiovascular events) and COPD(stable, increased risk pulmonary complications)  CAD. CVA. PVD (chronic severe progression, increase risk cardiovascular events)  former smoker. Failed RIGHT Fem-Tib (2003). Hamman/Keven. Most recent evaluation Dr. Aviles ([facility]) [facility]. RLE pain x 1mo, worsening claudication.   Underwent recent femorofemoral bypass returns today in follow-up.  No TIA, stroke, or amaurosis.  No MI or claudication. No other associated signs, symptoms or modifying factors.     2003 RIGHT femoral tibial bypass (SVG)  2004 \"absorbable stents\"  [facility]  9/2022: Arterial duplex(BDM): Severe right lower extremity inflow disease. Severe right lower extremity peripheral arterial disease. Right posterior tibial artery occlusion.   1/2023 MJ:  RIGHT noncompressible, bi/monophasic.  LEFT .74 biphasic.  2/2023 femoral-femoral bypass PTFE  3/2023  ROCÍO Art US: fem-fem graft patent, mPSV 227cm/s in flow vessel  3/2023 MJ: Right noncompressible triphasic femoral biphasic popliteal.  Left noncompressible triphasic    9/2022 Lower extremity venous duplex(BDM):  No dvt.  RIGHT GSV absent.     1/2020 ECG:  NSR 98, TQc 446  1/2020 Echocardiogram:  EF 45%, LA 32mm, LV 40mm.    The following portions of the patient's history were reviewed and updated as appropriate: allergies, current medications, past family history, past medical history, past social history, past surgical history and problem list.  Recent images independently reviewed.  Available laboratory values reviewed.    PMH:  Past Medical History:   Diagnosis Date   • Arthritis    • COPD (chronic obstructive pulmonary disease) (HCC)    • " Elevated cholesterol    • GERD (gastroesophageal reflux disease)    • Hypertension    • Myocardial infarct (HCC)    • Stroke (HCC) 2015    3-4 strokes in past; last one --has affected memory/balance   • Weight decreasing     over last 3 weeks     Past Surgical History:   Procedure Laterality Date   • CARDIAC CATHETERIZATION     • ENDOSCOPY N/A 2017    Procedure: ESOPHAGOGASTRODUODENOSCOPY;  Surgeon: Van Khoury DO;  Location: [facility] ENDOSCOPY;  Service:    • ENDOSCOPY N/A 2019    Procedure: ESOPHAGOGASTRODUODENOSCOPY;  Surgeon: Van Khoury DO;  Location: [facility] ENDOSCOPY;  Service: Gastroenterology   • ENDOSCOPY N/A 10/22/2020    Procedure: ESOPHAGOGASTRODUODENOSCOPY;  Surgeon: Van Khoury DO;  Location: [facility] ENDOSCOPY;  Service: Gastroenterology;  Laterality: N/A;  savory dilation 42-48   • ENDOSCOPY N/A 2022    Procedure: ESOPHAGOGASTRODUODENOSCOPY;  Surgeon: Van Khoury DO;  Location: [facility] ENDOSCOPY;  Service: Gastroenterology;  Laterality: N/A;   • FEMORAL FEMORAL BYPASS N/A 2023    Procedure: FEMORAL FEMORAL BYPASS                 (C-ARM#2 AND C-ARM TABLE )         ([facility];  Surgeon: Gopi Vasquez MD;  Location: [facility] OR;  Service: Vascular;  Laterality: N/A;   • FEMORAL TIBIAL BYPASS Right    • LEG SURGERY Right     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     Family History   Adopted: Yes     Social History     Tobacco Use   • Smoking status: Former     Types: Cigarettes     Quit date:      Years since quittin.2   • Smokeless tobacco: Never   Vaping Use   • Vaping Use: Never used   Substance Use Topics   • Alcohol use: Not Currently   • Drug use: Never       ALLERGIES:  Allergies   Allergen Reactions   • Contrast Dye (Echo Or Unknown Ct/Mr) Itching         MEDICATIONS:    Current Outpatient Medications:   •  albuterol (PROVENTIL HFA;VENTOLIN HFA) 108 (90 Base) MCG/ACT inhaler, Inhale 2 puffs Every  "6 (Six) Hours As Needed for Wheezing or Shortness of Air., Disp: , Rfl:   •  aspirin 81 MG chewable tablet, Chew 1 tablet Daily., Disp: 30 tablet, Rfl: 11  •  atorvastatin (LIPITOR) 40 MG tablet, Take 1 tablet by mouth Every Night., Disp: 90 tablet, Rfl: 3  •  budesonide-formoterol (SYMBICORT) 160-4.5 MCG/ACT inhaler, Inhale 2 puffs 2 (Two) Times a Day., Disp: , Rfl:   •  carboxymethylcellulose (REFRESH PLUS) 0.5 % solution, Administer 1 drop to both eyes 4 (Four) Times a Day As Needed for Dry Eyes., Disp: , Rfl:   •  clopidogrel (PLAVIX) 75 MG tablet, Take 1 tablet by mouth Daily., Disp: , Rfl:   •  cyclobenzaprine (FLEXERIL) 5 MG tablet, Take 1 tablet by mouth 3 (Three) Times a Day As Needed for Muscle Spasms., Disp: , Rfl:   •  docusate sodium (COLACE) 100 MG capsule, Take 1 capsule by mouth 2 (Two) Times a Day As Needed for Constipation., Disp: , Rfl:     Review of Systems   Review of Systems   Constitutional: Negative for malaise/fatigue and weight loss.   Cardiovascular: Positive for claudication. Negative for chest pain and dyspnea on exertion.   Respiratory: Negative for cough and shortness of breath.    Skin: Positive for color change. Negative for poor wound healing.   Musculoskeletal: Positive for myalgias.   Neurological: Positive for numbness and paresthesias. Negative for dizziness and weakness.       Physical Exam   Vitals:    03/17/23 1348   BP: 147/74   BP Location: Left arm   Patient Position: Sitting   Cuff Size: Adult   Pulse: 88   SpO2: 98%   Weight: 68.9 kg (152 lb)   Height: 185.4 cm (73\")     Body surface area is 1.91 meters squared.  Body mass index is 20.05 kg/m².  Physical Exam  Constitutional:       General: He is not in acute distress.     Appearance: He is not ill-appearing.   HENT:      Right Ear: Hearing normal.      Left Ear: Hearing normal.      Nose: No nasal deformity.      Mouth/Throat:      Dentition: Normal dentition. Does not have dentures.   Cardiovascular:      Rate and " Rhythm: Normal rate and regular rhythm.      Pulses:           Carotid pulses are 2+ on the right side and 2+ on the left side.       Radial pulses are 2+ on the right side and 2+ on the left side.        Dorsalis pedis pulses are 1+ on the right side and 2+ on the left side.        Posterior tibial pulses are 1+ on the right side and 2+ on the left side.      Heart sounds: No murmur heard.  Pulmonary:      Effort: Pulmonary effort is normal.      Breath sounds: Normal breath sounds.   Abdominal:      General: There is no distension.      Palpations: Abdomen is soft. There is no mass.      Tenderness: There is no abdominal tenderness.   Musculoskeletal:         General: No deformity.      Comments: Gait normal.    Skin:     General: Skin is warm and dry.      Coloration: Skin is not pale.      Findings: No erythema.      Comments: No venous staining  Bilateral groin incisions clean dry and intact   Neurological:      Mental Status: He is alert and oriented to person, place, and time.   Psychiatric:         Speech: Speech normal.         Behavior: Behavior is cooperative.         Thought Content: Thought content normal.         Judgment: Judgment normal.         BUN   Date Value Ref Range Status   02/08/2023 13 8 - 23 mg/dL Final     Creatinine   Date Value Ref Range Status   02/08/2023 1.05 0.76 - 1.27 mg/dL Final     eGFR Non  Amer   Date Value Ref Range Status   09/30/2021 59 (L) >60 mL/min/1.73 Final       ASSESSMENT/PLAN    1. PVD (peripheral vascular disease) (HCC)  Significantly improved right lower extremity perfusion.  Graft patent without elevated velocities.    Long-term dual antiplatelet therapy, statin.    Return in 12 weeks for repeat vascular studies graft ultrasound    - Duplex Lower Extremity Art / Grafts - Bilateral CAR; Future  - Doppler Ankle Brachial Index Single Level CAR; Future    2. Mixed hyperlipidemia  Lipid-lowering therapy has been proven beneficial in patients with cardio-vascular  disease. Current guidelines recommend statin treatment for all patients with PAD,CAD and carotid stenosis. Statins are beneficial in preventing cardiovascular events, increasing functional capacity and lower the risk of adverse limb loss in PAD. Statins decrease the progression of plaque formation and may improve peripheral vessel lining, and aid in reversing atherosclerosis.    3. Smoker  Smoking cessation assistance options offered including behavioral counseling (Smoking Cessation Classes), Nicotine replacement therapy (patches or gum), pharmacologic therapy (Chantix, Wellbutrin). Understands tobacco increases risk of expanding AAA, MI, CVA, PAD, carcinoma. Discussion and question answer period 5-7 minutes.    4. Surgical aftercare, circulatory system  Clean operative site with antibacterial soap/water, pat dry. Keep open to air unless draining, then may apply dry dressing.  No ointments or creams unless prescribed by provider.    Bilateral groin incisions are clean dry and intact    Released from surgical restrictions      Recommended regular physical activity, progressive walking program.  Continue current medications as directed.  Advance Care Planning   ACP discussion was declined by the patient. Patient does not have an advance directive, declines further assistance.     Thank you for the opportunity to participate in this patient's care.        This document has been electronically signed by Moses Dallas, AGACNP-BC @  On March 17, 2023 14:27 CDKEENA

## 2023-08-18 ENCOUNTER — OFFICE VISIT (OUTPATIENT)
Dept: CARDIAC SURGERY | Facility: CLINIC | Age: 68
End: 2023-08-18
Payer: OTHER GOVERNMENT

## 2023-08-18 VITALS
HEART RATE: 86 BPM | WEIGHT: 131 LBS | DIASTOLIC BLOOD PRESSURE: 64 MMHG | OXYGEN SATURATION: 98 % | HEIGHT: 73 IN | SYSTOLIC BLOOD PRESSURE: 108 MMHG | BODY MASS INDEX: 17.36 KG/M2

## 2023-08-18 DIAGNOSIS — E78.2 MIXED HYPERLIPIDEMIA: Primary | ICD-10-CM

## 2023-08-18 DIAGNOSIS — I73.9 PVD (PERIPHERAL VASCULAR DISEASE) WITH CLAUDICATION: ICD-10-CM

## 2023-08-18 PROCEDURE — 99214 OFFICE O/P EST MOD 30 MIN: CPT | Performed by: NURSE PRACTITIONER

## 2023-08-18 RX ORDER — TAMSULOSIN HYDROCHLORIDE 0.4 MG/1
0.4 CAPSULE ORAL DAILY
COMMUNITY
Start: 2023-06-16

## 2023-08-18 NOTE — PROGRESS NOTES
"1/16/2023    Aubrey Montilla  1955    Chief Complaint:    Chief Complaint   Patient presents with    Peripheral Vascular Disease       HPI:      PCP:  Dr Jiménez  Cardiology:  Dr Naik ([facility])    67 y.o. male with HTN(stable, increased risk stroke, rupture), Hyperlipidemia(stable, increased risk cardiovascular events) and COPD(stable, increased risk pulmonary complications)  CAD. CVA. PVD (chronic severe progression, increase risk cardiovascular events)  former smoker. Failed RIGHT Fem-Tib (2003). Hamman/Keven. Most recent evaluation Dr. Aviles ([facility]) [facility]. RLE pain x 1mo, worsening claudication.   Underwent recent femorofemoral bypass returns today in follow-up.  Legs feel occasionally weak, no specific claudication.  no TIA, stroke, or amaurosis.  No other associated signs, symptoms or modifying factors.     2003 RIGHT femoral tibial bypass (SVG)  2004 \"absorbable stents\"  [facility]  9/2022: Arterial duplex(BDM): Severe right lower extremity inflow disease. Severe right lower extremity peripheral arterial disease. Right posterior tibial artery occlusion.   1/2023 MJ:  RIGHT noncompressible, bi/monophasic.  LEFT .74 biphasic.  2/2023 femoral-femoral bypass PTFE  3/2023  ROCÍO Art US: fem-fem graft patent, mPSV 227cm/s in flow vessel  3/2023 MJ: Right noncompressible triphasic femoral biphasic popliteal.  Left noncompressible triphasic  8/2023 MJ: TBI right 0.29 triphasic femoral biphasic popliteal.  Left 0.65 triphasic femoral biphasic popliteal  8/2023  ROCÍO Art US: fem-fem graft patent, mPSV 128cm/s prox graft      9/2022 Lower extremity venous duplex(BDM):  No dvt.  RIGHT GSV absent.     1/2020 ECG:  NSR 98, TQc 446  1/2020 Echocardiogram:  EF 45%, LA 32mm, LV 40mm.    The following portions of the patient's history were reviewed and updated as appropriate: allergies, current medications, past family history, past medical history, past social history, past surgical history and " problem list.  Recent images independently reviewed.  Available laboratory values reviewed.    PMH:  Past Medical History:   Diagnosis Date    Arthritis     COPD (chronic obstructive pulmonary disease)     Elevated cholesterol     GERD (gastroesophageal reflux disease)     Hypertension     Myocardial infarct     Stroke     3-4 strokes in past; last one --has affected memory/balance    Weight decreasing     over last 3 weeks     Past Surgical History:   Procedure Laterality Date    CARDIAC CATHETERIZATION      ENDOSCOPY N/A 2017    Procedure: ESOPHAGOGASTRODUODENOSCOPY;  Surgeon: Van Khoury DO;  Location: [facility] ENDOSCOPY;  Service:     ENDOSCOPY N/A 2019    Procedure: ESOPHAGOGASTRODUODENOSCOPY;  Surgeon: Van Khoury DO;  Location: [facility] ENDOSCOPY;  Service: Gastroenterology    ENDOSCOPY N/A 10/22/2020    Procedure: ESOPHAGOGASTRODUODENOSCOPY;  Surgeon: Van Khoury DO;  Location: [facility] ENDOSCOPY;  Service: Gastroenterology;  Laterality: N/A;  savory dilation 42-48    ENDOSCOPY N/A 2022    Procedure: ESOPHAGOGASTRODUODENOSCOPY;  Surgeon: Van Khoury DO;  Location: [facility] ENDOSCOPY;  Service: Gastroenterology;  Laterality: N/A;    FEMORAL FEMORAL BYPASS N/A 2023    Procedure: FEMORAL FEMORAL BYPASS                 (C-ARM#2 AND C-ARM TABLE )         ([facility];  Surgeon: Gopi Vasquez MD;  Location: [facility] OR;  Service: Vascular;  Laterality: N/A;    FEMORAL TIBIAL BYPASS Right     LEG SURGERY Right     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     Family History   Adopted: Yes     Social History     Tobacco Use    Smoking status: Former     Types: Cigarettes     Quit date:      Years since quittin.6     Passive exposure: Past    Smokeless tobacco: Never   Vaping Use    Vaping Use: Never used   Substance Use Topics    Alcohol use: Not Currently    Drug use: Never       ALLERGIES:  Allergies   Allergen  "Reactions    Contrast Dye (Echo Or Unknown Ct/Mr) Itching         MEDICATIONS:    Current Outpatient Medications:     albuterol (PROVENTIL HFA;VENTOLIN HFA) 108 (90 Base) MCG/ACT inhaler, Inhale 2 puffs Every 6 (Six) Hours As Needed for Wheezing or Shortness of Air., Disp: , Rfl:     aspirin 81 MG chewable tablet, Chew 1 tablet Daily., Disp: 30 tablet, Rfl: 11    atorvastatin (LIPITOR) 40 MG tablet, Take 1 tablet by mouth Every Night., Disp: 90 tablet, Rfl: 3    budesonide-formoterol (SYMBICORT) 160-4.5 MCG/ACT inhaler, Inhale 2 puffs 2 (Two) Times a Day., Disp: , Rfl:     carboxymethylcellulose (REFRESH PLUS) 0.5 % solution, Administer 1 drop to both eyes 4 (Four) Times a Day As Needed for Dry Eyes., Disp: , Rfl:     Cholecalciferol 50 MCG (2000 UT) capsule, 50 mcg., Disp: , Rfl:     clopidogrel (PLAVIX) 75 MG tablet, Take 1 tablet by mouth Daily., Disp: , Rfl:     cyclobenzaprine (FLEXERIL) 5 MG tablet, Take 1 tablet by mouth 3 (Three) Times a Day As Needed for Muscle Spasms., Disp: , Rfl:     docusate sodium (COLACE) 100 MG capsule, Take 1 capsule by mouth 2 (Two) Times a Day As Needed for Constipation., Disp: , Rfl:     tamsulosin (FLOMAX) 0.4 MG capsule 24 hr capsule, Take 1 capsule by mouth Daily., Disp: , Rfl:     Review of Systems   Review of Systems   Constitutional: Negative for malaise/fatigue and weight loss.   Cardiovascular:  Positive for claudication. Negative for chest pain and dyspnea on exertion.   Respiratory:  Negative for cough and shortness of breath.    Hematologic/Lymphatic: Bruises/bleeds easily.   Skin:  Positive for color change. Negative for poor wound healing.   Musculoskeletal:  Positive for myalgias.   Neurological:  Positive for numbness and paresthesias. Negative for dizziness and weakness.     Physical Exam   Vitals:    08/18/23 1409   BP: 108/64   BP Location: Left arm   Pulse: 86   SpO2: 98%   Weight: 59.4 kg (131 lb)   Height: 185.4 cm (73\")     Body surface area is 1.8 meters " squared.  Body mass index is 17.28 kg/mý.  Physical Exam  Constitutional:       Appearance: He is not ill-appearing.   HENT:      Right Ear: Hearing normal.      Left Ear: Hearing normal.      Nose: No nasal deformity.      Mouth/Throat:      Dentition: Normal dentition. Does not have dentures.   Cardiovascular:      Rate and Rhythm: Normal rate and regular rhythm.      Pulses:           Carotid pulses are 2+ on the right side and 2+ on the left side.       Radial pulses are 2+ on the right side and 2+ on the left side.        Dorsalis pedis pulses are 1+ on the right side and 2+ on the left side.        Posterior tibial pulses are 1+ on the right side and 2+ on the left side.      Heart sounds: No murmur heard.  Pulmonary:      Effort: Pulmonary effort is normal.      Breath sounds: Normal breath sounds.   Abdominal:      General: There is no distension.      Palpations: Abdomen is soft. There is no mass.      Tenderness: There is no abdominal tenderness.   Musculoskeletal:         General: No deformity.      Comments: Gait normal.    Skin:     General: Skin is warm and dry.      Coloration: Skin is not pale.      Findings: No erythema.      Comments: No venous staining   Neurological:      Mental Status: He is alert and oriented to person, place, and time.   Psychiatric:         Speech: Speech normal.         Behavior: Behavior is cooperative.         Thought Content: Thought content normal.         Judgment: Judgment normal.       BUN   Date Value Ref Range Status   02/08/2023 13 8 - 23 mg/dL Final     Creatinine   Date Value Ref Range Status   02/08/2023 1.05 0.76 - 1.27 mg/dL Final     eGFR Non  Amer   Date Value Ref Range Status   09/30/2021 59 (L) >60 mL/min/1.73 Final       ASSESSMENT/PLAN    1. PVD (peripheral vascular disease) ([facility])  Significantly improved right lower extremity perfusion.      Mild to moderate reduction bilateral lower extremity perfusion waveforms are consistent with bilateral  femoral disease claudication not lifestyle limiting    Graft patent without elevated velocities.    Long-term dual antiplatelet therapy, statin.    Return in 6 months MJ    2. Mixed hyperlipidemia  Treatment of hyperlipidemia prevents the progression of peripheral vascular disease and atherosclerosis in multiple beds.  Lipid-lowering therapy may improve claudication distance.  Statins are also considered mandatory in patients with PAD by virtue of reducing cardiovascular events as shown in the heart protection study.  Target LDL for PAD patients is less than 70 mg/dL.  Consideration of PCSK9 inhibitors and/or other adjuvant therapies and statin intolerant patients is recommended.  Continued follow up with PCP is recommended for patients on lipid lowering therapies.    Lab Results   Component Value Date    CHOL 205 (H) 09/11/2020    TRIG 105 09/11/2020    HDL 44 09/11/2020     (H) 09/11/2020         Recommended regular physical activity, progressive walking program.  Continue current medications as directed.  Advance Care Planning   ACP discussion was declined by the patient. Patient does not have an advance directive, declines further assistance.     Thank you for the opportunity to participate in this patient's care.        This document has been electronically signed by BETINA DriverCNP-BC Prabhu  On August 18, 2023 15:01 LEO

## 2023-08-18 NOTE — PATIENT INSTRUCTIONS
The results of your vascular studies of your right leg shows moderate disease with fair  circulation, in the left leg shows moderatedisease with fair  circulation.      If signs and symptoms of ischemia should occur including but not limited to pale/blue discoloration of limb, increasing pain with ambulation or at rest, or a non-healing wound. Patient is to notify Heart and Vascular center for immediate evaluation.    Recheck studies in 6 months

## 2023-12-05 NOTE — DISCHARGE INSTRUCTIONS
Outpatient Instructions for Monitored Anesthesia Care (MAC)    1. You will be released from the hospital in the care of a responsible adult who should remain with you for at least 6 hours.    2. You are at an increased risk for falls following anesthesia. Use care when changing from a lying to a sitting position. Use your assistive devices ( example: cane, walker or family member).    3. You must NOT drive a car, climb high places such as a ladder, or operate equipment such as electric knives,stoves etc...for at least 12 hours. If you are dizzy for longer than 24 hours, notify your doctor.    4. DO NOT drink any alcoholic beverages for at least 24 hours. Anesthesia may impair your judgement.    5. If you smoke, do not smoke alone due to increased risk of burns/fires.    6. DO NOT undertake any legally binding commitment for at least 24 hours. Anesthesia may impair your judgement.     Spinal

## 2024-12-16 NOTE — PATIENT INSTRUCTIONS
- continue atorvastatin   The results of your vascular studies of your right leg shows moderate disease with good  circulation, in the left leg shows milddisease with good  circulation.      If signs and symptoms of ischemia should occur including but not limited to pale/blue discoloration of limb, increasing pain with ambulation or at rest, or a non-healing wound. Patient is to notify Heart and Vascular center for immediate evaluation.    Return in 3 months MJ and graft ultrasound.

## (undated) DEVICE — CANN SMPL SOFTECH BIFLO ETCO2 A/M 7FT

## (undated) DEVICE — ADHS SKIN MASTISOL CAP 2OZ DISP

## (undated) DEVICE — PREVENA PEEL & PLACE SYSTEM KIT- 13 CM: Brand: PREVENA™ PEEL & PLACE™

## (undated) DEVICE — SUT MONOCRYL 4/0 PS2 27IN Y426H ETY426H

## (undated) DEVICE — SUT PROLN CARDIO BV1 6/0 24IN 8805H

## (undated) DEVICE — SUT GORE TTC12 CV6  6M10B

## (undated) DEVICE — GLV SURG SENSICARE PI MIC PF SZ7.5 LF STRL

## (undated) DEVICE — DRAPE,SPLIT,BILATERAL,STERILE: Brand: MEDLINE

## (undated) DEVICE — ADHS SKIN PREMIERPRO EXOFIN TOPICAL HI/VISC .5ML

## (undated) DEVICE — INTENDED FOR TISSUE SEPARATION, AND OTHER PROCEDURES THAT REQUIRE A SHARP SURGICAL BLADE TO PUNCTURE OR CUT.: Brand: BARD-PARKER ® CARBON RIB-BACK BLADES

## (undated) DEVICE — PACK,UNIVERSAL,NO GOWNS: Brand: MEDLINE

## (undated) DEVICE — TOWEL,OR,DSP,ST,BLUE,DLX,8/PK,10PK/CS: Brand: MEDLINE

## (undated) DEVICE — SUT SILK 0 TIES 18IN A186H BX36

## (undated) DEVICE — BITEBLOCK ENDO W/STRAP 60F A/ LF DISP

## (undated) DEVICE — SINGLE-USE BIOPSY FORCEPS: Brand: RADIAL JAW 4

## (undated) DEVICE — CORONARY ARTERY BYPASS GRAFT MARKERS, STAINLESS STEEL, DISTAL, WITHOUT HOLDER: Brand: ANASTOMARK CORONARY ARTERY BYPASS GRAFT MARKERS, STAINLESS STEEL, DISTAL

## (undated) DEVICE — SHEET,DRAPE,53X77,STERILE: Brand: MEDLINE

## (undated) DEVICE — PATIENT RETURN ELECTRODE, SINGLE-USE, CONTACT QUALITY MONITORING, ADULT, WITH 9FT CORD, FOR PATIENTS WEIGING OVER 33LBS. (15KG): Brand: MEGADYNE

## (undated) DEVICE — CLEANGUIDE DISPOSABLE MARKED SPRING TIP GUIDEWIRE, 1.86 MM X 210 CM: Brand: CLEANGUIDE

## (undated) DEVICE — SUT VIC 3/0 SH 27IN J416H

## (undated) DEVICE — PART NUMBER 108260, VTI 8 MHZ DISPOSABLE DOPPLER PROBE, STRAIGHT, BOX: Brand: VTI 8 MHZ DISPOSABLE DOPPLER PROBE, STRAIGHT, BOX

## (undated) DEVICE — FOGARTY - HYDRAGRIP SURGICAL - CLAMP INSERTS: Brand: FOGARTY SOFTJAW

## (undated) DEVICE — MAD PERIPHERAL VASCULAR-LF: Brand: MEDLINE INDUSTRIES, INC.

## (undated) DEVICE — STERILE POLYISOPRENE POWDER-FREE SURGICAL GLOVES WITH EMOLLIENT COATING: Brand: PROTEXIS

## (undated) DEVICE — 3M™ IOBAN™ 2 ANTIMICROBIAL INCISE DRAPE 6651EZ: Brand: IOBAN™ 2

## (undated) DEVICE — SUT VICRYL 4/0 SUTUPAK 18 J109T

## (undated) DEVICE — SUT PROLENE 7-0 BV175-7 24IN DB ETH8766H

## (undated) DEVICE — GLV SURG SENSICARE PI PF LF 7 GRN STRL

## (undated) DEVICE — SUT PROLENE CARDIO C1D 6/0 24IN 8726H

## (undated) DEVICE — SUT PDS 3/0 SH 27IN DYED Z316H

## (undated) DEVICE — PENCL ES MEGADINE EZ/CLEAN BUTN W/HOLSTR 10FT

## (undated) DEVICE — GAUZE,SPONGE,4"X4",16PLY,XRAY,STRL,LF: Brand: MEDLINE

## (undated) DEVICE — TOTAL TRAY, 16FR 10ML SIL FOLEY, URN: Brand: MEDLINE

## (undated) DEVICE — SYR LL TP 10ML STRL

## (undated) DEVICE — PROXIMATE SKIN STAPLERS (35 WIDE) CONTAINS 35 STAINLESS STEEL STAPLES (FIXED HEAD): Brand: PROXIMATE